# Patient Record
Sex: FEMALE | Race: WHITE | NOT HISPANIC OR LATINO | Employment: OTHER | URBAN - METROPOLITAN AREA
[De-identification: names, ages, dates, MRNs, and addresses within clinical notes are randomized per-mention and may not be internally consistent; named-entity substitution may affect disease eponyms.]

---

## 2017-05-24 ENCOUNTER — TELEPHONE (OUTPATIENT)
Dept: FAMILY MEDICINE | Facility: CLINIC | Age: 82
End: 2017-05-24

## 2017-05-24 RX ORDER — ASPIRIN 81 MG/1
1 TABLET ORAL DAILY
COMMUNITY
End: 2018-05-02

## 2017-05-24 RX ORDER — ALPRAZOLAM 0.25 MG/1
1 TABLET ORAL DAILY
COMMUNITY
Start: 2016-11-22 | End: 2017-12-27

## 2017-05-24 NOTE — TELEPHONE ENCOUNTER
Pt is taking benzonatate for a cough.  Can she take [facility] tussin DM max cough and chest congestion with it?

## 2017-06-19 LAB — HM EYE EXAM: NORMAL

## 2017-10-20 ENCOUNTER — TELEPHONE (OUTPATIENT)
Dept: FAMILY MEDICINE | Facility: CLINIC | Age: 82
End: 2017-10-20

## 2017-10-20 DIAGNOSIS — R73.9 HYPERGLYCEMIA: Primary | ICD-10-CM

## 2017-10-20 NOTE — TELEPHONE ENCOUNTER
----- Message from Ava Bahena sent at 10/20/2017 10:17 AM CDT -----  Contact: patient  Patient needs A1C lab done but wants to go to [facility] on [address]. Please call her at ph# [phone]

## 2017-12-27 ENCOUNTER — OFFICE VISIT (OUTPATIENT)
Dept: FAMILY MEDICINE | Facility: CLINIC | Age: 82
End: 2017-12-27
Payer: MEDICARE

## 2017-12-27 VITALS
HEIGHT: 66 IN | WEIGHT: 119 LBS | HEART RATE: 69 BPM | SYSTOLIC BLOOD PRESSURE: 122 MMHG | DIASTOLIC BLOOD PRESSURE: 62 MMHG | BODY MASS INDEX: 19.13 KG/M2 | OXYGEN SATURATION: 98 %

## 2017-12-27 DIAGNOSIS — E11.9 TYPE 2 DIABETES MELLITUS WITHOUT COMPLICATION, WITHOUT LONG-TERM CURRENT USE OF INSULIN: ICD-10-CM

## 2017-12-27 DIAGNOSIS — G44.219 EPISODIC TENSION-TYPE HEADACHE, NOT INTRACTABLE: Primary | ICD-10-CM

## 2017-12-27 DIAGNOSIS — Z23 FLU VACCINE NEED: ICD-10-CM

## 2017-12-27 PROBLEM — G44.209 TENSION TYPE HEADACHE: Status: ACTIVE | Noted: 2017-12-27

## 2017-12-27 PROCEDURE — G0008 ADMIN INFLUENZA VIRUS VAC: HCPCS | Mod: ,,, | Performed by: FAMILY MEDICINE

## 2017-12-27 PROCEDURE — 99213 OFFICE O/P EST LOW 20 MIN: CPT | Mod: 25,,, | Performed by: FAMILY MEDICINE

## 2017-12-27 PROCEDURE — 90662 IIV NO PRSV INCREASED AG IM: CPT | Mod: ,,, | Performed by: FAMILY MEDICINE

## 2017-12-27 NOTE — PATIENT INSTRUCTIONS
Diabetes and Heart Disease     Take your medicines as directed each day, even if you feel fine.   If you have diabetes, you are two to four times more likely to have heart disease than someone without diabetes. This higher risk is due to diabetes, but it is also due to other risk factors for heart disease that happen in people with diabetes. But theres good news. You can help control your health risks by making some changes in your life. You can take steps to reduce your risk of heart disease by half--similar to the risk in people who don't have diabetes.  Your main risk factors  Three major risk factors for heart disease are high blood sugar, high blood pressure, and high levels of lipids. By keeping risk factors under control, you can help keep your heart and arteries healthy. This may reduce your chances of a heart attack.  · Blood sugar. High blood sugar can make artery walls tough and rough. Plaque (waxy material in the blood) can then build up along the artery walls, making it harder for blood to flow through the arteries. Having high blood sugar increases the chances of having high blood pressure and high cholesterol.  · Blood pressure. When blood pressure is high all the time it causes your heart to work harder to pump blood. Artery walls become damaged. This increases the risk for plaque build up.  · Lipids. The body needs some lipids in the blood to stay healthy. But lipid levels that are too high can damage the artery walls. Lipids include cholesterol and triglycerides. There are two kinds of cholesterol. LDL (bad) cholesterol can damage the arteries. But HDL (good) cholesterol helps clear LDL cholesterol from the blood vessels. This helps keep the arteries healthy. When blood sugar is high, the level of triglycerides in the blood may also be high. High blood triglyceride levels can cause plaque to form.   Other risk factors  Certain lifestyle factors can increase levels of your blood sugar, blood  pressure, and lipids. Such increases raise your risk of heart disease:  · Smoking damages the lining of your arteries. This allows plaque to build up in the artery walls. Smoking also constricts (narrows) the arteries. This can raise blood pressure and cause chest pain or angina. Smoking also increases your risk of getting type 2 diabetes.  · Not being active makes it harder for your heart to do its work. Inactivity is linked to many other risk factors, such as high blood pressure and poor cholesterol levels. Inactivity also increases your risk of getting type 2 diabetes.  · Being overweight makes it harder for your body to use insulin. It also makes your heart work too hard. Being overweight is also the main contributor to the development of type 2 diabetes,   Changes you can make  Following a few simple steps can help keep your risk factors under control. Work with your healthcare team to reach your goals.  · Quitting smoking could save your life. Smoking damages the lining of the blood vessels and raises blood pressure. Smoking also affects how your body uses insulin. This makes it harder to keep blood sugar under control. If you smoke and need help quitting, talk to your healthcare team.   · Testing your blood sugar is the only way to know whether it is under control. Be sure to test your blood sugar yourself. Also get your blood tested in the lab, as directed.  · Monitoring your blood pressure and lipid levels can help you achieve safe levels. Visit your healthcare team as scheduled.  · Taking medicines as directed can help control blood sugar, blood pressure, blood clotting, and/or cholesterol levels.  · Eating right can reduce your risk factors and help you lose weight. Try to limit the amount of processed or refined carbohydrates you eat at one time. Cut back on your total calorie intake. Eat foods low in saturated fat and cholesterol. Eat fiber, including vegetables and whole grains, and cut down on salt. A  dietitian or diabetes educator can help form a meal plan that works for you--even if you are on a low budget.   · Being active can help reduce your weight, strengthen your heart, and lower your lipid levels and blood pressure. Exercise and activity are good for your whole body. Talk to your healthcare team about increasing your activity safely over time.  · Keeping your appointments with your healthcare provider helps you stay healthy. Go in for checkups and lab tests as scheduled.  Date Last Reviewed: 5/19/2016  © 0216-0589 [employer]. [address] [place] [address], [place] [address]. All rights reserved. This information is not intended as a substitute for professional medical care. Always follow your healthcare professional's instructions.

## 2017-12-27 NOTE — PROGRESS NOTES
Subjective:       Patient ID: Jennifer Alarcon is a 85 y.o. female.    Chief Complaint: Flu Vaccine and Headache (with stress that comes and goes )    Headache    This is a chronic problem. The current episode started more than 1 year ago. The problem occurs intermittently. The problem has been unchanged. The pain is located in the left unilateral region. The pain does not radiate. The quality of the pain is described as stabbing. The pain is at a severity of 2/10. The pain is mild. Associated symptoms include abdominal pain. Pertinent negatives include no coughing, dizziness, ear pain, eye pain, fever, hearing loss, insomnia, loss of balance, muscle aches, nausea, neck pain, photophobia, scalp tenderness, seizures, sinus pressure, sore throat, swollen glands, tingling, tinnitus, visual change, vomiting or weakness. Nothing aggravates the symptoms. She has tried nothing for the symptoms.     Review of Systems   Constitutional: Negative for appetite change, chills, diaphoresis, fatigue and fever.   HENT: Negative for congestion, ear pain, hearing loss, sinus pressure, sore throat, tinnitus and trouble swallowing.    Eyes: Negative for photophobia, pain and visual disturbance.   Respiratory: Negative for cough, chest tightness and shortness of breath.    Cardiovascular: Negative for chest pain, palpitations and leg swelling.   Gastrointestinal: Positive for abdominal pain. Negative for blood in stool, constipation, diarrhea, nausea and vomiting.   Endocrine: Negative for cold intolerance and heat intolerance.   Genitourinary: Negative for difficulty urinating, flank pain, pelvic pain and vaginal pain.   Musculoskeletal: Negative for arthralgias, myalgias and neck pain.   Skin: Negative for rash.   Allergic/Immunologic: Negative for immunocompromised state.   Neurological: Positive for headaches. Negative for dizziness, tingling, tremors, seizures, syncope, speech difficulty, weakness, light-headedness and loss of  "balance.   Hematological: Negative for adenopathy. Does not bruise/bleed easily.   Psychiatric/Behavioral: Negative for confusion, decreased concentration, dysphoric mood, self-injury and suicidal ideas. The patient does not have insomnia.        Past Medical History:   Diagnosis Date    Atrial fibrillation, controlled     Cancer     colon    Diabetes mellitus, type 2     Hypertension     Hypothyroidism       Past Surgical History:   Procedure Laterality Date    APPENDECTOMY      BREAST SURGERY      lt breast bx    CHOLECYSTECTOMY      COLON SURGERY      EYE SURGERY         History reviewed. No pertinent family history.    Social History     Social History    Marital status:      Spouse name: N/A    Number of children: N/A    Years of education: N/A     Occupational History    retired      Social History Main Topics    Smoking status: Never Smoker    Smokeless tobacco: Never Used    Alcohol use No    Drug use: No    Sexual activity: No     Other Topics Concern    None     Social History Narrative    None       Current Outpatient Prescriptions   Medication Sig Dispense Refill    aspirin (ECOTRIN) 81 MG EC tablet Take 1 tablet by mouth once daily at 6am.       No current facility-administered medications for this visit.        Review of patient's allergies indicates:   Allergen Reactions    Cortisone      abd pain    Nitrofurantoin monohyd/m-cryst      leg cramps     Objective:    HPI     Headache    Additional comments: with stress that comes and goes        Last edited by Dwaine Gonzalez MA on 12/27/2017 10:53 AM. (History)      Blood pressure 122/62, pulse 69, height 5' 5.5" (1.664 m), weight 54 kg (119 lb), SpO2 98 %. Body mass index is 19.5 kg/m².   Physical Exam   Constitutional: She is oriented to person, place, and time. She appears well-developed and well-nourished. She is cooperative. No distress.   HENT:   Head: Normocephalic and atraumatic.   Right Ear: Tympanic membrane " normal.   Left Ear: Tympanic membrane normal.   Eyes: Conjunctivae, EOM and lids are normal. Pupils are equal, round, and reactive to light. Lids are everted and swept, no foreign bodies found. Right pupil is round and reactive. Left pupil is round and reactive.   Neck: Trachea normal and normal range of motion. Neck supple.   Cardiovascular: Normal rate, regular rhythm, S1 normal, S2 normal, normal heart sounds and intact distal pulses.    Pulmonary/Chest: Breath sounds normal.   Abdominal: There is no rigidity and no guarding.   Musculoskeletal: Normal range of motion.   Lymphadenopathy:     She has no cervical adenopathy.     She has no axillary adenopathy.   Neurological: She is alert and oriented to person, place, and time.   Skin: Skin is warm and dry. Capillary refill takes less than 2 seconds.   Psychiatric: She has a normal mood and affect. Her behavior is normal. Judgment and thought content normal.   Nursing note and vitals reviewed.          Assessment:       1. Episodic tension-type headache, not intractable    2. Flu vaccine need    3. Type 2 diabetes mellitus without complication, without long-term current use of insulin        Plan:       Jennifer was seen today for flu vaccine and headache.    Diagnoses and all orders for this visit:    Episodic tension-type headache, not intractable - likely TMJ associated    Flu vaccine need  -     Influenza - High Dose (65+) (PF) (IM)    Type 2 diabetes mellitus without complication, without long-term current use of insulin  Comments:  6.0 diet controlled  Orders:  -     Hemoglobin A1C, POCT

## 2018-04-10 ENCOUNTER — TELEPHONE (OUTPATIENT)
Dept: FAMILY MEDICINE | Facility: CLINIC | Age: 83
End: 2018-04-10

## 2018-04-10 NOTE — TELEPHONE ENCOUNTER
----- Message from Sharita Henao LPN sent at 4/5/2018  3:50 PM CDT -----      ----- Message -----  From: Camila Rodriguez  Sent: 4/5/2018   7:37 AM  To: Bryan Lopez Staff    Patient was discharged from [facility] on 4/4/18. Please schedule f/u ov with patient before 5/4/18. We need to document discharged medicines with current medicine list and bill the 1111F code on claim.

## 2018-05-02 ENCOUNTER — OFFICE VISIT (OUTPATIENT)
Dept: FAMILY MEDICINE | Facility: CLINIC | Age: 83
End: 2018-05-02
Payer: MEDICARE

## 2018-05-02 VITALS
HEART RATE: 54 BPM | HEIGHT: 66 IN | WEIGHT: 115 LBS | DIASTOLIC BLOOD PRESSURE: 60 MMHG | OXYGEN SATURATION: 96 % | BODY MASS INDEX: 18.48 KG/M2 | SYSTOLIC BLOOD PRESSURE: 114 MMHG

## 2018-05-02 DIAGNOSIS — E03.9 HYPOTHYROIDISM, UNSPECIFIED TYPE: ICD-10-CM

## 2018-05-02 DIAGNOSIS — I48.91 ATRIAL FIBRILLATION, CONTROLLED: Primary | ICD-10-CM

## 2018-05-02 DIAGNOSIS — E11.9 TYPE 2 DIABETES MELLITUS WITHOUT COMPLICATION, WITHOUT LONG-TERM CURRENT USE OF INSULIN: ICD-10-CM

## 2018-05-02 DIAGNOSIS — K59.00 CONSTIPATION, UNSPECIFIED CONSTIPATION TYPE: ICD-10-CM

## 2018-05-02 PROBLEM — I10 HYPERTENSION: Status: ACTIVE | Noted: 2018-05-02

## 2018-05-02 PROBLEM — C18.9 CANCER OF COLON: Status: ACTIVE | Noted: 2018-05-02

## 2018-05-02 PROCEDURE — 99214 OFFICE O/P EST MOD 30 MIN: CPT | Mod: ,,, | Performed by: NURSE PRACTITIONER

## 2018-05-02 RX ORDER — METOPROLOL TARTRATE 50 MG/1
1 TABLET ORAL 2 TIMES DAILY
Status: ON HOLD | COMMUNITY
Start: 2018-05-01 | End: 2020-01-04 | Stop reason: HOSPADM

## 2018-05-02 RX ORDER — SENNOSIDES 8.6 MG
1 TABLET ORAL 2 TIMES DAILY
Refills: 0 | COMMUNITY
Start: 2018-04-04 | End: 2019-12-02

## 2018-05-02 RX ORDER — TRIAMCINOLONE ACETONIDE 1 MG/G
PASTE DENTAL
COMMUNITY
End: 2021-08-11

## 2018-05-02 NOTE — PATIENT INSTRUCTIONS
Constipation (Adult)  Constipation means that you have bowel movements that are less frequent than usual. Stools often become very hard and difficult to pass.  Constipation is very common. At some point in life it affects almost everyone. Since everyone's bowel habits are different, what is constipation to one person may not be to another. Your healthcare provider may do tests to diagnose constipation. It depends on what he or she finds when evaluating you.    Symptoms of constipation include:  · Abdominal pain  · Bloating  · Vomiting  · Painful bowel movements  · Itching, swelling, bleeding, or pain around the anus  Causes  Constipation can have many causes. These include:  · Diet low in fiber  · Too much dairy  · Not drinking enough liquids  · Lack of exercise or physical activity. This is especially true for older adults.  · Changes in lifestyle or daily routine, including pregnancy, aging, work, and travel  · Frequent use or misuse of laxatives  · Ignoring the urge to have a bowel movement or delaying it until later  · Medicines, such as certain prescription pain medicines, iron supplements, antacids, certain antidepressants, and calcium supplements  · Diseases like irritable bowel syndrome, bowel obstructions, stroke, diabetes, thyroid disease, Parkinson disease, hemorrhoids, and colon cancer  Complications  Potential complications of constipation can include:  · Hemorrhoids  · Rectal bleeding from hemorrhoids or anal fissures (skin tears)  · Hernias  · Dependency on laxatives  · Chronic constipation  · Fecal impaction  · Bowel obstruction or perforation  Home care  All treatment should be done after talking with your healthcare provider. This is especially true if you have another medical problems, are taking prescription medicines, or are an older adult. Treatment most often involves lifestyle changes. You may also need medicines. Your healthcare provider will tell you which will work best for you. Follow  the advice below to help avoid this problem in the future.  Lifestyle changes  These lifestyle changes can help prevent constipation:  · Diet. Eat a high-fiber diet, with fresh fruit and vegetables, and reduce dairy intake, meats, and processed foods  · Fluids. It's important to get enough fluids each day. Drink plenty of water when you eat more fiber. If you are on diet that limits the amount of fluid you can have, talk about this with your healthcare provider.  · Regular exercise. Check with your healthcare provider first.  Medications  Take any medicines as directed. Some laxatives are safe to use only every now and then. Others can be taken on a regular basis. Talk with your doctor or pharmacist if you have questions.  Prescription pain medicines can cause constipation. If you are taking this kind of medicine, ask your healthcare provider if you should also take a stool softener.  Medicines you may take to treat constipation include:  · Fiber supplements  · Stool softeners  · Laxatives  · Enemas  · Rectal suppositories  Follow-up care  Follow up with your healthcare provider if symptoms don't get better in the next few days. You may need to have more tests or see a specialist.  Call 911  Call 911 if any of these occur:  · Trouble breathing  · Stiff, rigid abdomen that is severely painful to touch  · Confusion  · Fainting or loss of consciousness  · Rapid heart rate  · Chest pain  When to seek medical advice  Call your healthcare provider right away if any of these occur:  · Fever over 100.4°F (38°C)  · Failure to resume normal bowel movements  · Pain in your abdomen or back gets worse  · Nausea or vomiting  · Swelling in your abdomen  · Blood in the stool  · Black, tarry stool  · Involuntary weight loss  · Weakness  Date Last Reviewed: 12/30/2015  © 2000-8629 [employer] [address] [place] [address], [place] [address]. All rights reserved. This information is not intended as a substitute for  professional medical care. Always follow your healthcare professional's instructions.

## 2018-05-02 NOTE — PROGRESS NOTES
SUBJECTIVE:      Patient ID: Jennifer Alarcon is a 85 y.o. female.    Chief Complaint: Hospital Follow Up (recurrent A-Fib)    Hospital f/u for A fib with RVR. Was having a colonoscopy with Dr Ma when she went into A fib with RVR, sent to the ER and admitted for rate control. Discharged on Xarelto and metoprolol. She has a f/u with Dr Horan May 15th. Patient was constipated in hospital, was discharged on a stool softener which she is not taking. Once again constipation. Will give education.   History of hypothyroidism: TSH 5.77 in hospital, T3 2.8, thyroxine free 0.91. Will monitor    Heart Problem   This is a new (A fib with RVR) problem. The current episode started 1 to 4 weeks ago. The problem has been gradually improving. Pertinent negatives include no chest pain, chills, diaphoresis, fever, joint swelling, vomiting or weakness. Treatments tried: beta blocker.   Constipation   This is a new problem. The current episode started 1 to 4 weeks ago. The problem is unchanged. Her stool frequency is 2 to 3 times per week. The patient is not on a high fiber diet. She does not exercise regularly. There has not been adequate water intake. Pertinent negatives include no difficulty urinating, fever or vomiting. She has tried stool softeners for the symptoms.       Past Surgical History:   Procedure Laterality Date    APPENDECTOMY      BREAST SURGERY      lt breast bx    CHOLECYSTECTOMY      COLON SURGERY      EYE SURGERY       History reviewed. No pertinent family history.   Social History     Social History    Marital status:      Spouse name: N/A    Number of children: N/A    Years of education: N/A     Occupational History    retired      Social History Main Topics    Smoking status: Never Smoker    Smokeless tobacco: Never Used    Alcohol use No    Drug use: No    Sexual activity: No     Other Topics Concern    None     Social History Narrative    None     Current Outpatient  Prescriptions   Medication Sig Dispense Refill    metoprolol tartrate (LOPRESSOR) 50 MG tablet 1 tablet 2 (two) times daily.      rivaroxaban (XARELTO) 20 mg Tab Take 20 mg by mouth daily with dinner or evening meal.      SENNA 8.6 mg tablet 1 tablet 2 (two) times daily.  0    triamcinolone acetonide 0.1% (KENALOG) 0.1 % paste triamcinolone 0.1 % topical ointment and dimethicone 5 % topical cream       No current facility-administered medications for this visit.      Review of patient's allergies indicates:   Allergen Reactions    Cortisone      abd pain    Nitrofurantoin monohyd/m-cryst      leg cramps      Past Medical History:   Diagnosis Date    Atrial fibrillation, controlled     Cancer     colon    Diabetes mellitus, type 2     Hypertension     Hypothyroidism      Past Surgical History:   Procedure Laterality Date    APPENDECTOMY      BREAST SURGERY      lt breast bx    CHOLECYSTECTOMY      COLON SURGERY      EYE SURGERY         Review of Systems   Constitutional: Negative for appetite change, chills, diaphoresis, fever and unexpected weight change.   HENT: Negative for ear discharge, hearing loss, trouble swallowing and voice change.    Eyes: Negative for photophobia and pain.   Respiratory: Negative for chest tightness, shortness of breath and stridor.    Cardiovascular: Negative for chest pain.   Gastrointestinal: Positive for constipation. Negative for blood in stool and vomiting.   Endocrine: Negative for cold intolerance and heat intolerance.   Genitourinary: Negative for difficulty urinating and flank pain.   Musculoskeletal: Negative for joint swelling and neck stiffness.   Skin: Negative for pallor.   Neurological: Negative for dizziness, speech difficulty, weakness and light-headedness.   Hematological: Does not bruise/bleed easily.   Psychiatric/Behavioral: Negative for confusion.      OBJECTIVE:      Vitals:    05/02/18 0957   BP: 114/60   Pulse: (!) 54   SpO2: 96%   Weight: 52.2 kg  "(115 lb)   Height: 5' 5.5" (1.664 m)     Physical Exam   Constitutional: She is oriented to person, place, and time. She appears well-developed and well-nourished.   HENT:   Head: Atraumatic.   Mouth/Throat: Oropharynx is clear and moist.   Eyes: Conjunctivae are normal.   Neck: Neck supple. No JVD present. Carotid bruit is not present.   Cardiovascular: Normal rate, regular rhythm, normal heart sounds and intact distal pulses.    Pulses:       Popliteal pulses are 2+ on the right side, and 2+ on the left side.   Pulmonary/Chest: Effort normal and breath sounds normal. She has no wheezes. She has no rhonchi. She has no rales.   Abdominal: Soft. Bowel sounds are normal. She exhibits no distension. There is no tenderness.   Musculoskeletal: Normal range of motion.   Lymphadenopathy:     She has no cervical adenopathy.   Neurological: She is alert and oriented to person, place, and time.   Skin: Skin is warm and dry. No rash noted.   Psychiatric: She has a normal mood and affect. Her speech is normal.   Nursing note and vitals reviewed.   hospital records reviewed  Assessment:       1. Atrial fibrillation, controlled    2. Constipation, unspecified constipation type    3. Hypothyroidism, unspecified type    4. Type 2 diabetes mellitus without complication, without long-term current use of insulin        Plan:       Atrial fibrillation, controlled  Cont current meds and keep f/u with Dr Horan     Constipation, unspecified constipation type  Patient will restart Senna    Hypothyroidism, unspecified type  Stable     Type 2 diabetes mellitus without complication, without long-term current use of insulin  Stable, patient will be due for A1c in June      Follow-up for  has f/u with Dr Adams, will need A1c in office.      5/2/2018 Nicolas Bryant, AKIL, FNP      "

## 2018-06-28 ENCOUNTER — OFFICE VISIT (OUTPATIENT)
Dept: FAMILY MEDICINE | Facility: CLINIC | Age: 83
End: 2018-06-28
Payer: MEDICARE

## 2018-06-28 VITALS
HEIGHT: 66 IN | DIASTOLIC BLOOD PRESSURE: 60 MMHG | BODY MASS INDEX: 18.32 KG/M2 | HEART RATE: 56 BPM | SYSTOLIC BLOOD PRESSURE: 120 MMHG | OXYGEN SATURATION: 96 % | WEIGHT: 114 LBS

## 2018-06-28 DIAGNOSIS — E11.9 TYPE 2 DIABETES MELLITUS WITHOUT COMPLICATION, WITH LONG-TERM CURRENT USE OF INSULIN: Primary | ICD-10-CM

## 2018-06-28 DIAGNOSIS — Z79.4 TYPE 2 DIABETES MELLITUS WITHOUT COMPLICATION, WITH LONG-TERM CURRENT USE OF INSULIN: Primary | ICD-10-CM

## 2018-06-28 DIAGNOSIS — I48.91 CONTROLLED ATRIAL FIBRILLATION: ICD-10-CM

## 2018-06-28 LAB — HBA1C MFR BLD: 6.4 %

## 2018-06-28 PROCEDURE — 83036 HEMOGLOBIN GLYCOSYLATED A1C: CPT | Mod: QW,,, | Performed by: FAMILY MEDICINE

## 2018-06-28 PROCEDURE — 99213 OFFICE O/P EST LOW 20 MIN: CPT | Mod: ,,, | Performed by: FAMILY MEDICINE

## 2018-06-28 RX ORDER — MUPIROCIN 20 MG/G
OINTMENT TOPICAL
Refills: 3 | COMMUNITY
Start: 2018-06-18 | End: 2020-01-09

## 2018-06-28 NOTE — PROGRESS NOTES
Subjective:       Patient ID: Jennifer Alarcon is a 85 y.o. female.    Chief Complaint: Diabetes (A1c in office)    Controlling with diet, she is hesitant to take any meds      Diabetes   She presents for her follow-up diabetic visit. She has type 2 diabetes mellitus. Her disease course has been stable. Pertinent negatives for hypoglycemia include no confusion, dizziness or headaches. Pertinent negatives for diabetes include no blurred vision, no chest pain, no fatigue, no foot ulcerations, no visual change and no weakness.     Review of Systems   Constitutional: Negative for appetite change, chills, diaphoresis, fatigue and fever.   HENT: Negative for congestion, ear pain, hearing loss, sore throat and trouble swallowing.    Eyes: Negative for blurred vision, photophobia, pain and visual disturbance.   Respiratory: Negative for cough, chest tightness and shortness of breath.    Cardiovascular: Negative for chest pain, palpitations and leg swelling.   Gastrointestinal: Negative for abdominal pain, blood in stool, constipation, diarrhea, nausea and vomiting.   Endocrine: Negative for cold intolerance and heat intolerance.   Genitourinary: Negative for difficulty urinating, flank pain, pelvic pain and vaginal pain.   Musculoskeletal: Negative for arthralgias and myalgias.   Skin: Negative for rash.   Allergic/Immunologic: Negative for immunocompromised state.   Neurological: Negative for dizziness, weakness, light-headedness and headaches.   Hematological: Negative for adenopathy. Does not bruise/bleed easily.   Psychiatric/Behavioral: Negative for confusion, self-injury and suicidal ideas.       Past Medical History:   Diagnosis Date    Atrial fibrillation, controlled     Cancer     colon    Diabetes mellitus, type 2     Hypertension     Hypothyroidism       Past Surgical History:   Procedure Laterality Date    APPENDECTOMY      BREAST SURGERY      lt breast bx    CHOLECYSTECTOMY      COLON SURGERY      EYE  "SURGERY         No family history on file.    Social History     Social History    Marital status:      Spouse name: N/A    Number of children: N/A    Years of education: N/A     Occupational History    retired      Social History Main Topics    Smoking status: Never Smoker    Smokeless tobacco: Never Used    Alcohol use No    Drug use: No    Sexual activity: No     Other Topics Concern    None     Social History Narrative    None       Current Outpatient Prescriptions   Medication Sig Dispense Refill    metoprolol tartrate (LOPRESSOR) 50 MG tablet 1 tablet 2 (two) times daily.      mupirocin (BACTROBAN) 2 % ointment BLANCA A SMALL AMOUNT AA QHS  3    rivaroxaban (XARELTO) 20 mg Tab Take 20 mg by mouth daily with dinner or evening meal.      SENNA 8.6 mg tablet 1 tablet 2 (two) times daily.  0    triamcinolone acetonide 0.1% (KENALOG) 0.1 % paste triamcinolone 0.1 % topical ointment and dimethicone 5 % topical cream       No current facility-administered medications for this visit.        Review of patient's allergies indicates:   Allergen Reactions    Cortisone      abd pain    Nitrofurantoin monohyd/m-cryst      leg cramps     Objective:    HPI     Diabetes    Additional comments: A1c in office       Last edited by Kacie Smallwood MA on 6/28/2018 10:39 AM. (History)      Blood pressure 120/60, pulse (!) 56, height 5' 5.5" (1.664 m), weight 51.7 kg (114 lb), SpO2 96 %. Body mass index is 18.68 kg/m².   Physical Exam   Constitutional: She is oriented to person, place, and time. She appears well-developed and well-nourished. She is cooperative. No distress.   HENT:   Head: Normocephalic and atraumatic.   Right Ear: Tympanic membrane normal.   Left Ear: Tympanic membrane normal.   Eyes: Conjunctivae, EOM and lids are normal. Pupils are equal, round, and reactive to light. Lids are everted and swept, no foreign bodies found. Right pupil is round and reactive. Left pupil is round and reactive. "   Neck: Trachea normal and normal range of motion. Neck supple.   Cardiovascular: Normal rate, regular rhythm, S1 normal, S2 normal, normal heart sounds and intact distal pulses.    Pulmonary/Chest: Breath sounds normal.   Abdominal: There is no rigidity and no guarding.   Musculoskeletal: Normal range of motion.   Lymphadenopathy:     She has no cervical adenopathy.     She has no axillary adenopathy.   Neurological: She is alert and oriented to person, place, and time.   Skin: Skin is warm and dry. Capillary refill takes less than 2 seconds.   Psychiatric: She has a normal mood and affect. Her behavior is normal. Judgment and thought content normal.   Nursing note and vitals reviewed.          Assessment:       1. Type 2 diabetes mellitus without complication, with long-term current use of insulin    2. Controlled atrial fibrillation        Plan:       Jennifer was seen today for diabetes.    Diagnoses and all orders for this visit:    Type 2 diabetes mellitus without complication, with long-term current use of insulin  -     Hemoglobin A1C, POCT    Controlled atrial fibrillation  The patient is asked to make an attempt to improve diet and exercise patterns to aid in medical management of this problem.  Stevie 6m or any changes

## 2018-09-27 ENCOUNTER — TELEPHONE (OUTPATIENT)
Dept: FAMILY MEDICINE | Facility: CLINIC | Age: 83
End: 2018-09-27

## 2018-09-27 NOTE — TELEPHONE ENCOUNTER
----- Message from Linda Watts sent at 9/27/2018  1:53 PM CDT -----  Pt scheduled with Dr. Gonzales Monday 10/1/18.  Asking for recommendation of OTC for cough.  I offered her appt today but she cannot get here today.

## 2018-10-01 ENCOUNTER — OFFICE VISIT (OUTPATIENT)
Dept: FAMILY MEDICINE | Facility: CLINIC | Age: 83
End: 2018-10-01
Payer: MEDICARE

## 2018-10-01 VITALS
DIASTOLIC BLOOD PRESSURE: 62 MMHG | WEIGHT: 115 LBS | HEIGHT: 66 IN | BODY MASS INDEX: 18.48 KG/M2 | OXYGEN SATURATION: 96 % | TEMPERATURE: 97 F | HEART RATE: 62 BPM | SYSTOLIC BLOOD PRESSURE: 120 MMHG

## 2018-10-01 DIAGNOSIS — E11.9 TYPE 2 DIABETES MELLITUS WITHOUT COMPLICATION, WITHOUT LONG-TERM CURRENT USE OF INSULIN: ICD-10-CM

## 2018-10-01 DIAGNOSIS — R05.9 COUGH: ICD-10-CM

## 2018-10-01 DIAGNOSIS — E03.4 HYPOTHYROIDISM DUE TO ACQUIRED ATROPHY OF THYROID: Primary | ICD-10-CM

## 2018-10-01 PROBLEM — C18.9 COLON CANCER: Status: RESOLVED | Noted: 2018-05-02 | Resolved: 2018-10-01

## 2018-10-01 PROCEDURE — 99213 OFFICE O/P EST LOW 20 MIN: CPT | Mod: ,,, | Performed by: INTERNAL MEDICINE

## 2018-10-01 NOTE — PROGRESS NOTES
Subjective:       Patient ID: Jennifer Alarcon is a 85 y.o. female.    Chief Complaint: Thyroid Problem (abnormal labs) and Cough (x 2 weeks)    Here to review some recent labs she had done with her Cardiologist; found to have TSH of 13.5.  In review of her chart, she has apparently had an abnormal TSH going back about 10 years but prior attempts to use levothyroxine resulted in tachycardia.  She doesn't remember being on meds in the past.  I see a TSH as high as 12.4 from 2015.  She was in the hospital at the end of March and TSH was 5.77.        Thyroid Problem   Presents for initial visit. Symptoms include constipation (takes Senna for constipation), dry skin (not new), fatigue (about 4-6 months) and hair loss. Patient reports no anxiety, cold intolerance, diarrhea, heat intolerance, leg swelling, menstrual problem, palpitations (h/o Afib but no episodes in last several months), tremors, weight gain or weight loss. The symptoms have been worsening. Her past medical history is significant for atrial fibrillation and diabetes.   Cough   This is a new problem. The current episode started 1 to 4 weeks ago. The problem has been gradually improving. The cough is non-productive (had phlegm initially but none now). Associated symptoms include myalgias, shortness of breath and wheezing. Pertinent negatives include no chest pain, chills, fever, headaches, postnasal drip, rash, rhinorrhea or weight loss. There is no history of environmental allergies.     Review of Systems   Constitutional: Positive for fatigue (about 4-6 months). Negative for chills, fever, unexpected weight change, weight gain and weight loss.   HENT: Positive for hearing loss. Negative for congestion, postnasal drip, rhinorrhea, trouble swallowing and voice change.    Eyes: Negative for photophobia and visual disturbance.   Respiratory: Positive for cough, shortness of breath and wheezing. Negative for apnea, choking and chest tightness.     Cardiovascular: Negative for chest pain, palpitations (h/o Afib but no episodes in last several months) and leg swelling.   Gastrointestinal: Positive for constipation (takes Senna for constipation). Negative for abdominal pain, blood in stool, diarrhea, nausea, rectal pain and vomiting.   Endocrine: Negative for cold intolerance, heat intolerance, polydipsia and polyuria.   Genitourinary: Negative for decreased urine volume, difficulty urinating, dysuria, frequency, genital sores, hematuria, menstrual problem, pelvic pain, urgency, vaginal bleeding and vaginal discharge.   Musculoskeletal: Positive for arthralgias and myalgias. Negative for back pain, gait problem, joint swelling, neck pain and neck stiffness.   Skin: Negative for color change, rash and wound.   Allergic/Immunologic: Negative for environmental allergies and food allergies.   Neurological: Negative for dizziness, tremors, seizures, syncope, facial asymmetry, speech difficulty, weakness, light-headedness, numbness and headaches.   Hematological: Negative for adenopathy. Bruises/bleeds easily.   Psychiatric/Behavioral: Positive for sleep disturbance. Negative for confusion, hallucinations and suicidal ideas. The patient is not nervous/anxious.        Past Medical History:   Diagnosis Date    Atrial fibrillation, controlled     Colon cancer     Diabetes mellitus, type 2     Hypertension     Hypothyroidism       Past Surgical History:   Procedure Laterality Date    APPENDECTOMY      BREAST SURGERY      lt breast bx    CHOLECYSTECTOMY      COLON SURGERY      EYE SURGERY         Family History   Problem Relation Age of Onset    Diabetes Mother     Heart disease Mother     Heart disease Father     Diabetes Father        Social History     Socioeconomic History    Marital status:      Spouse name: None    Number of children: None    Years of education: None    Highest education level: None   Social Needs    Financial resource  "strain: None    Food insecurity - worry: None    Food insecurity - inability: None    Transportation needs - medical: None    Transportation needs - non-medical: None   Occupational History    Occupation: retired   Tobacco Use    Smoking status: Never Smoker    Smokeless tobacco: Never Used   Substance and Sexual Activity    Alcohol use: No    Drug use: No    Sexual activity: No   Other Topics Concern    None   Social History Narrative    Live alone       Current Outpatient Medications   Medication Sig Dispense Refill    metoprolol tartrate (LOPRESSOR) 50 MG tablet 1 tablet 2 (two) times daily.      mupirocin (BACTROBAN) 2 % ointment BLANCA A SMALL AMOUNT AA QHS  3    rivaroxaban (XARELTO) 20 mg Tab Take 20 mg by mouth daily with dinner or evening meal.      SENNA 8.6 mg tablet 1 tablet 2 (two) times daily.  0    triamcinolone acetonide 0.1% (KENALOG) 0.1 % paste triamcinolone 0.1 % topical ointment and dimethicone 5 % topical cream       No current facility-administered medications for this visit.        Review of patient's allergies indicates:   Allergen Reactions    Cortisone      abd pain    Nitrofurantoin monohyd/m-cryst      leg cramps     Objective:    HPI     Thyroid Problem      Additional comments: abnormal labs              Cough      Additional comments: x 2 weeks          Last edited by Mario Pichardo MA on 10/1/2018  9:08 AM. (History)      Blood pressure 120/62, pulse 62, temperature 97.4 °F (36.3 °C), height 5' 5.5" (1.664 m), weight 52.2 kg (115 lb), SpO2 96 %. Body mass index is 18.85 kg/m².   Physical Exam   Constitutional: She appears well-developed. No distress.   HENT:   Nose: Nose normal.   Mouth/Throat: Oropharynx is clear and moist.   Eyes: Conjunctivae are normal. Right eye exhibits no discharge. Left eye exhibits no discharge. No scleral icterus.   Neck: Carotid bruit is not present.   Cardiovascular: Normal rate, regular rhythm and normal heart sounds.   No murmur " heard.  Pulmonary/Chest: Effort normal and breath sounds normal. No respiratory distress. She has no decreased breath sounds. She has no wheezes. She has no rhonchi. She has no rales.   Abdominal: Soft. She exhibits no distension. There is no tenderness. There is no rebound and no guarding.   Musculoskeletal: She exhibits no edema.   Neurological: She is alert. She displays no tremor. Gait (ambulation with cane) abnormal.   Skin: Skin is warm and dry.   Psychiatric: She has a normal mood and affect. Her speech is normal.   Nursing note and vitals reviewed.          Assessment:       1. Hypothyroidism due to acquired atrophy of thyroid    2. Cough    3. Type 2 diabetes mellitus without complication, without long-term current use of insulin        Plan:       Jennifer was seen today for thyroid problem and cough.    Diagnoses and all orders for this visit:    Hypothyroidism due to acquired atrophy of thyroid  Comments:  Although she doesn't recall prior treatment, there is a note in her chart that prior treatment resulted in tachycardia.  Labs suggests waxing and waning course.  Orders:  -     TSH; Future  -     T4, free; Future  -     TSH  -     T4, free    Cough  Comments:  Likely resolving URI; call if worsens      Type 2 diabetes mellitus without complication, without long-term current use of insulin  -     Comprehensive metabolic panel; Future  -     Hemoglobin A1c; Future  -     Lipid panel; Future  -     Microalbumin/creatinine urine ratio; Future  -     Urinalysis; Future  -     Comprehensive metabolic panel  -     Hemoglobin A1c  -     Lipid panel  -     Microalbumin/creatinine urine ratio  -     Urinalysis

## 2019-01-11 LAB
ALBUMIN SERPL-MCNC: 4.5 G/DL (ref 3.5–4.7)
ALBUMIN/GLOB SERPL: 1.7 {RATIO} (ref 1.2–2.2)
ALP SERPL-CCNC: 48 IU/L (ref 39–117)
ALT SERPL-CCNC: 19 IU/L (ref 0–32)
AST SERPL-CCNC: 23 IU/L (ref 0–40)
BILIRUB SERPL-MCNC: 1.2 MG/DL (ref 0–1.2)
BUN SERPL-MCNC: 15 MG/DL (ref 8–27)
BUN/CREAT SERPL: 21 (ref 12–28)
CALCIUM SERPL-MCNC: 9.7 MG/DL (ref 8.7–10.3)
CHLORIDE SERPL-SCNC: 104 MMOL/L (ref 96–106)
CHOLEST SERPL-MCNC: 188 MG/DL (ref 100–199)
CO2 SERPL-SCNC: 24 MMOL/L (ref 20–29)
CREAT SERPL-MCNC: 0.72 MG/DL (ref 0.57–1)
CREAT UR-MCNC: NORMAL MG/DL
EGFR IF AFRICAN AMERICAN: 88 ML/MIN/1.73
EST. GFR  (NON AFRICAN AMERICAN): 76 ML/MIN/1.73
GLOBULIN SER CALC-MCNC: 2.6 G/DL (ref 1.5–4.5)
GLUCOSE SERPL-MCNC: 131 MG/DL (ref 65–99)
GLUCOSE UR QL: NORMAL
HBA1C MFR BLD: 6.4 % (ref 4.8–5.6)
HDLC SERPL-MCNC: 54 MG/DL
KETONES UR QL STRIP: NORMAL
LDLC SERPL CALC-MCNC: 112 MG/DL (ref 0–99)
MICROALBUMIN UR-MCNC: NORMAL
PH UR STRIP: NORMAL [PH]
POTASSIUM SERPL-SCNC: 4.3 MMOL/L (ref 3.5–5.2)
PROT SERPL-MCNC: 7.1 G/DL (ref 6–8.5)
PROT UR QL STRIP: NORMAL
SODIUM SERPL-SCNC: 144 MMOL/L (ref 134–144)
SP GR UR: NORMAL
T4 FREE SERPL-MCNC: 1.02 NG/DL (ref 0.82–1.77)
TRIGL SERPL-MCNC: 111 MG/DL (ref 0–149)
TSH SERPL DL<=0.005 MIU/L-ACNC: 9.67 UIU/ML (ref 0.45–4.5)
VLDLC SERPL CALC-MCNC: 22 MG/DL (ref 5–40)

## 2019-01-21 ENCOUNTER — OFFICE VISIT (OUTPATIENT)
Dept: FAMILY MEDICINE | Facility: CLINIC | Age: 84
End: 2019-01-21
Payer: MEDICARE

## 2019-01-21 VITALS
DIASTOLIC BLOOD PRESSURE: 70 MMHG | WEIGHT: 119 LBS | OXYGEN SATURATION: 97 % | SYSTOLIC BLOOD PRESSURE: 104 MMHG | BODY MASS INDEX: 19.13 KG/M2 | HEART RATE: 79 BPM | HEIGHT: 66 IN

## 2019-01-21 DIAGNOSIS — E03.4 HYPOTHYROIDISM DUE TO ACQUIRED ATROPHY OF THYROID: Primary | ICD-10-CM

## 2019-01-21 DIAGNOSIS — E11.9 TYPE 2 DIABETES MELLITUS WITHOUT COMPLICATION, WITHOUT LONG-TERM CURRENT USE OF INSULIN: ICD-10-CM

## 2019-01-21 DIAGNOSIS — Z23 NEED FOR PROPHYLACTIC VACCINATION AND INOCULATION AGAINST INFLUENZA: ICD-10-CM

## 2019-01-21 PROCEDURE — 90662 IIV NO PRSV INCREASED AG IM: CPT | Mod: ,,, | Performed by: INTERNAL MEDICINE

## 2019-01-21 PROCEDURE — 99213 OFFICE O/P EST LOW 20 MIN: CPT | Mod: 25,,, | Performed by: INTERNAL MEDICINE

## 2019-01-21 PROCEDURE — 99213 PR OFFICE/OUTPT VISIT, EST, LEVL III, 20-29 MIN: ICD-10-PCS | Mod: 25,,, | Performed by: INTERNAL MEDICINE

## 2019-01-21 PROCEDURE — G0008 ADMIN INFLUENZA VIRUS VAC: HCPCS | Mod: ,,, | Performed by: INTERNAL MEDICINE

## 2019-01-21 PROCEDURE — G0008 FLU VACCINE - HIGH DOSE (65+) PRESERVATIVE FREE IM: ICD-10-PCS | Mod: ,,, | Performed by: INTERNAL MEDICINE

## 2019-01-21 PROCEDURE — 90662 FLU VACCINE - HIGH DOSE (65+) PRESERVATIVE FREE IM: ICD-10-PCS | Mod: ,,, | Performed by: INTERNAL MEDICINE

## 2019-01-21 NOTE — PROGRESS NOTES
Subjective:       Patient ID: Jennifer Alarcon is a 86 y.o. female.    Chief Complaint: Hypothyroidism (3/m f/u, labs)    Here to review recent labs.  In review of her chart, she has apparently had an abnormal TSH going back about 10 years but prior attempts to use levothyroxine resulted in tachycardia.  I see a TSH as high as 12.4 from 2015.  She was in the hospital at the end of March and TSH was 5.77.   She has diet controlled diabetes        Thyroid Problem   Presents for initial visit. Symptoms include anxiety, dry skin (not new), fatigue (mostly in AM; feels better in the afternoons) and hair loss. Patient reports no cold intolerance, constipation, diarrhea, heat intolerance, leg swelling, menstrual problem, palpitations, tremors, weight gain or weight loss. The symptoms have been worsening. Her past medical history is significant for atrial fibrillation and diabetes.   Diabetes   She presents for her follow-up diabetic visit. She has type 2 diabetes mellitus. Her disease course has been stable. Hypoglycemia symptoms include nervousness/anxiousness. Pertinent negatives for hypoglycemia include no confusion, dizziness, headaches, seizures, speech difficulty or tremors. Associated symptoms include fatigue (mostly in AM; feels better in the afternoons). Pertinent negatives for diabetes include no chest pain, no polydipsia, no polyuria, no weakness and no weight loss. There are no hypoglycemic complications. Current diabetic treatment includes diet. She is compliant with treatment all of the time. An ACE inhibitor/angiotensin II receptor blocker is not being taken. Eye exam is current.     Review of Systems   Constitutional: Positive for fatigue (mostly in AM; feels better in the afternoons). Negative for chills, fever, unexpected weight change, weight gain and weight loss.   HENT: Negative for congestion, hearing loss, postnasal drip, rhinorrhea, trouble swallowing and voice change.    Eyes: Negative for  photophobia and visual disturbance.   Respiratory: Positive for shortness of breath. Negative for apnea, cough, choking, chest tightness and wheezing.    Cardiovascular: Negative for chest pain, palpitations and leg swelling.   Gastrointestinal: Negative for abdominal pain, blood in stool, constipation, diarrhea, nausea, rectal pain and vomiting.   Endocrine: Negative for cold intolerance, heat intolerance, polydipsia and polyuria.   Genitourinary: Negative for decreased urine volume, difficulty urinating, dysuria, frequency, genital sores, hematuria, menstrual problem, pelvic pain, urgency, vaginal bleeding and vaginal discharge.   Musculoskeletal: Positive for arthralgias. Negative for back pain, gait problem, joint swelling, myalgias, neck pain and neck stiffness.   Skin: Negative for color change, rash and wound.   Allergic/Immunologic: Negative for environmental allergies and food allergies.   Neurological: Negative for dizziness, tremors, seizures, syncope, facial asymmetry, speech difficulty, weakness, light-headedness, numbness and headaches.   Hematological: Negative for adenopathy. Does not bruise/bleed easily.   Psychiatric/Behavioral: Negative for confusion, hallucinations, sleep disturbance and suicidal ideas. The patient is nervous/anxious.        Past Medical History:   Diagnosis Date    Atrial fibrillation, controlled     Colon cancer     Diabetes mellitus, type 2     Hypertension     Hypothyroidism       Past Surgical History:   Procedure Laterality Date    APPENDECTOMY      BREAST SURGERY      lt breast bx    CHOLECYSTECTOMY      COLON SURGERY      EYE SURGERY         Family History   Problem Relation Age of Onset    Diabetes Mother     Heart disease Mother     Heart disease Father     Diabetes Father        Social History     Socioeconomic History    Marital status:      Spouse name: None    Number of children: None    Years of education: None    Highest education level:  "None   Social Needs    Financial resource strain: None    Food insecurity - worry: None    Food insecurity - inability: None    Transportation needs - medical: None    Transportation needs - non-medical: None   Occupational History    Occupation: retired   Tobacco Use    Smoking status: Never Smoker    Smokeless tobacco: Never Used   Substance and Sexual Activity    Alcohol use: No    Drug use: No    Sexual activity: No   Other Topics Concern    None   Social History Narrative    Live alone       Current Outpatient Medications   Medication Sig Dispense Refill    metoprolol tartrate (LOPRESSOR) 50 MG tablet 1 tablet 2 (two) times daily.      mupirocin (BACTROBAN) 2 % ointment BLANCA A SMALL AMOUNT AA QHS  3    rivaroxaban (XARELTO) 20 mg Tab Take 20 mg by mouth daily with dinner or evening meal.      SENNA 8.6 mg tablet 1 tablet 2 (two) times daily.  0    triamcinolone acetonide 0.1% (KENALOG) 0.1 % paste triamcinolone 0.1 % topical ointment and dimethicone 5 % topical cream       No current facility-administered medications for this visit.        Review of patient's allergies indicates:   Allergen Reactions    Cortisone      abd pain    Nitrofurantoin monohyd/m-cryst      leg cramps     Objective:    HPI     Hypothyroidism      Additional comments: 3/m f/u, labs          Last edited by Sharita Henao LPN on 1/21/2019 10:42 AM. (History)      Blood pressure 104/70, pulse 79, height 5' 5.5" (1.664 m), weight 54 kg (119 lb), SpO2 97 %. Body mass index is 19.5 kg/m².   Physical Exam   Constitutional: She appears well-developed. No distress.   HENT:   Nose: Nose normal.   Mouth/Throat: Oropharynx is clear and moist.   Eyes: Conjunctivae are normal. Right eye exhibits no discharge. Left eye exhibits no discharge. No scleral icterus.   Neck: Carotid bruit is not present.   Cardiovascular: Normal rate and normal heart sounds. An irregularly irregular rhythm present.   No murmur heard.  Pulmonary/Chest: " Effort normal and breath sounds normal. No respiratory distress. She has no decreased breath sounds. She has no wheezes. She has no rhonchi. She has no rales.   Abdominal: Soft. She exhibits no distension. There is no tenderness. There is no rebound and no guarding.   Musculoskeletal: She exhibits no edema.   Neurological: She is alert. She displays no tremor. Gait (ambulation with cane) abnormal.   Skin: Skin is warm and dry.   Psychiatric: She has a normal mood and affect. Her speech is normal.   Nursing note and vitals reviewed.        No visits with results within 1 Month(s) from this visit.   Latest known visit with results is:   Office Visit on 10/01/2018   Component Date Value Ref Range Status    Glucose 01/10/2019 131* 65 - 99 mg/dL Final    BUN, Bld 01/10/2019 15  8 - 27 mg/dL Final    Creatinine 01/10/2019 0.72  0.57 - 1.00 mg/dL Final    eGFR if non African American 01/10/2019 76  >59 mL/min/1.73 Final    eGFR if  01/10/2019 88  >59 mL/min/1.73 Final    BUN/Creatinine Ratio 01/10/2019 21  12 - 28 Final    Sodium 01/10/2019 144  134 - 144 mmol/L Final    Potassium 01/10/2019 4.3  3.5 - 5.2 mmol/L Final    Chloride 01/10/2019 104  96 - 106 mmol/L Final    CO2 01/10/2019 24  20 - 29 mmol/L Final    Calcium 01/10/2019 9.7  8.7 - 10.3 mg/dL Final    Total Protein 01/10/2019 7.1  6.0 - 8.5 g/dL Final    Albumin 01/10/2019 4.5  3.5 - 4.7 g/dL Final    Globulin, Total 01/10/2019 2.6  1.5 - 4.5 g/dL Final    Albumin/Globulin Ratio 01/10/2019 1.7  1.2 - 2.2 Final    Total Bilirubin 01/10/2019 1.2  0.0 - 1.2 mg/dL Final    Alkaline Phosphatase 01/10/2019 48  39 - 117 IU/L Final    AST 01/10/2019 23  0 - 40 IU/L Final    ALT 01/10/2019 19  0 - 32 IU/L Final    Hemoglobin A1C 01/10/2019 6.4* 4.8 - 5.6 % Final    Comment:          Prediabetes: 5.7 - 6.4           Diabetes: >6.4           Glycemic control for adults with diabetes: <7.0      Cholesterol 01/10/2019 188  100 - 199 mg/dL  Final    Triglycerides 01/10/2019 111  0 - 149 mg/dL Final    HDL 01/10/2019 54  >39 mg/dL Final    VLDL Cholesterol [place] 01/10/2019 22  5 - 40 mg/dL Final    LDL Calculated 01/10/2019 112* 0 - 99 mg/dL Final    Creatinine, Random Ur 01/10/2019 CANCELED  mg/dL Final-Edited    Comment: [facility] was unable to collect sufficient specimen to perform the  following test(s), and is providing the patient with re-collection  instructions.    Result canceled by the ancillary.      Microalb, Ur 01/10/2019 CANCELED   Final-Edited    Comment: Test not performed    Result canceled by the ancillary.      Specific [place], UA 01/10/2019 CANCELED   Final-Edited    Comment: [facility] was unable to collect sufficient specimen to perform the  following test(s), and is providing the patient with re-collection  instructions.    Result canceled by the ancillary.      pH, UA 01/10/2019 CANCELED   Final-Edited    Comment: Test not performed    Result canceled by the ancillary.      Protein, UA 01/10/2019 CANCELED   Final-Edited    Comment: Test not performed    Result canceled by the ancillary.      Glucose, UA 01/10/2019 CANCELED   Final-Edited    Comment: Test not performed    Result canceled by the ancillary.      Ketones, UA 01/10/2019 CANCELED   Final-Edited    Comment: Test not performed    Result canceled by the ancillary.      TSH 01/10/2019 9.670* 0.450 - 4.500 uIU/mL Final    T4, Free 01/10/2019 1.02  0.82 - 1.77 ng/dL Final   ]  Assessment:       1. Hypothyroidism due to acquired atrophy of thyroid    2. Need for prophylactic vaccination and inoculation against influenza    3. Type 2 diabetes mellitus without complication, without long-term current use of insulin        Plan:       Jennifer was seen today for hypothyroidism.    Diagnoses and all orders for this visit:    Hypothyroidism due to acquired atrophy of thyroid  Comments:  Will continue to monitor off meds for now    Need for prophylactic vaccination and  inoculation against influenza  -     Influenza - High Dose (65+) (PF) (IM)    Type 2 diabetes mellitus without complication, without long-term current use of insulin  Comments:  Well controlled with diet.  She has never been on a statin and at her age, I'm not sure there would be benefit in starting one now.

## 2019-06-17 ENCOUNTER — OFFICE VISIT (OUTPATIENT)
Dept: FAMILY MEDICINE | Facility: CLINIC | Age: 84
End: 2019-06-17
Payer: MEDICARE

## 2019-06-17 VITALS
TEMPERATURE: 98 F | DIASTOLIC BLOOD PRESSURE: 60 MMHG | SYSTOLIC BLOOD PRESSURE: 102 MMHG | HEIGHT: 66 IN | BODY MASS INDEX: 19.13 KG/M2 | WEIGHT: 119 LBS | OXYGEN SATURATION: 98 % | HEART RATE: 88 BPM

## 2019-06-17 DIAGNOSIS — E03.4 HYPOTHYROIDISM DUE TO ACQUIRED ATROPHY OF THYROID: ICD-10-CM

## 2019-06-17 DIAGNOSIS — E11.9 TYPE 2 DIABETES MELLITUS WITHOUT COMPLICATION, WITHOUT LONG-TERM CURRENT USE OF INSULIN: Primary | ICD-10-CM

## 2019-06-17 DIAGNOSIS — B35.1 ONYCHOMYCOSIS OF TOENAIL: ICD-10-CM

## 2019-06-17 PROCEDURE — 99213 PR OFFICE/OUTPT VISIT, EST, LEVL III, 20-29 MIN: ICD-10-PCS | Mod: ,,, | Performed by: INTERNAL MEDICINE

## 2019-06-17 PROCEDURE — 99213 OFFICE O/P EST LOW 20 MIN: CPT | Mod: ,,, | Performed by: INTERNAL MEDICINE

## 2019-06-17 NOTE — PROGRESS NOTES
Subjective:       Patient ID: Jennifer Alarcon is a 86 y.o. female.    Chief Complaint: Diabetes (continuity of care 5 month followup) and Hypertension    Here for routine follow up.  In review of her chart, she has apparently had an abnormal TSH going back about 10 years but prior attempts to use levothyroxine resulted in tachycardia.  I see a TSH as high as 12.4 from 2015.  She was in the hospital at the end of March and TSH was 5.77.   She has diet controlled diabetes      Thyroid Problem   Presents for initial visit. Symptoms include anxiety, constipation, dry skin (not new), fatigue and hair loss. Patient reports no cold intolerance, diarrhea, heat intolerance, leg swelling, menstrual problem, palpitations, tremors, weight gain or weight loss. Her past medical history is significant for atrial fibrillation and diabetes.   Diabetes   She presents for her follow-up diabetic visit. She has type 2 diabetes mellitus. Her disease course has been stable. Hypoglycemia symptoms include nervousness/anxiousness. Pertinent negatives for hypoglycemia include no confusion, dizziness, headaches, seizures, speech difficulty or tremors. Associated symptoms include fatigue and weakness. Pertinent negatives for diabetes include no chest pain, no polydipsia, no polyuria and no weight loss. There are no hypoglycemic complications. Current diabetic treatment includes diet. She is compliant with treatment all of the time. An ACE inhibitor/angiotensin II receptor blocker is not being taken. Eye exam is current.     Review of Systems   Constitutional: Positive for fatigue. Negative for chills, fever, unexpected weight change, weight gain and weight loss.   HENT: Positive for trouble swallowing. Negative for congestion, hearing loss, postnasal drip, rhinorrhea and voice change.    Eyes: Negative for photophobia and visual disturbance.   Respiratory: Positive for shortness of breath (on exertion). Negative for apnea, cough, choking, chest  tightness and wheezing.    Cardiovascular: Negative for chest pain, palpitations and leg swelling.   Gastrointestinal: Positive for constipation. Negative for abdominal pain, blood in stool, diarrhea, nausea, rectal pain and vomiting.   Endocrine: Negative for cold intolerance, heat intolerance, polydipsia and polyuria.   Genitourinary: Positive for frequency. Negative for decreased urine volume, difficulty urinating, dysuria, genital sores, hematuria, menstrual problem, pelvic pain, urgency, vaginal bleeding and vaginal discharge.   Musculoskeletal: Positive for joint swelling (right knee swelling). Negative for arthralgias (right knee pain), back pain, gait problem, myalgias, neck pain and neck stiffness.   Skin: Negative for color change, rash and wound.   Allergic/Immunologic: Negative for environmental allergies and food allergies.   Neurological: Positive for weakness. Negative for dizziness, tremors, seizures, syncope, facial asymmetry, speech difficulty, light-headedness, numbness and headaches.   Hematological: Negative for adenopathy. Does not bruise/bleed easily.   Psychiatric/Behavioral: Negative for confusion, hallucinations, sleep disturbance and suicidal ideas. The patient is nervous/anxious.        Past Medical History:   Diagnosis Date    Atrial fibrillation, controlled     Bullous pemphigoid     Colon cancer     Diabetes mellitus, type 2     Eczema     Hypertension     Hypothyroidism       Past Surgical History:   Procedure Laterality Date    APPENDECTOMY      BREAST SURGERY      lt breast bx    CHOLECYSTECTOMY      COLON SURGERY      EYE SURGERY         Family History   Problem Relation Age of Onset    Diabetes Mother     Heart disease Mother     Heart disease Father     Diabetes Father        Social History     Socioeconomic History    Marital status:      Spouse name: Not on file    Number of children: Not on file    Years of education: Not on file    Highest education  level: Not on file   Occupational History    Occupation: retired   Social Needs    Financial resource strain: Not on file    Food insecurity:     Worry: Not on file     Inability: Not on file    Transportation needs:     Medical: Not on file     Non-medical: Not on file   Tobacco Use    Smoking status: Never Smoker    Smokeless tobacco: Never Used   Substance and Sexual Activity    Alcohol use: No    Drug use: No    Sexual activity: Never   Lifestyle    Physical activity:     Days per week: Not on file     Minutes per session: Not on file    Stress: Only a little   Relationships    Social connections:     Talks on phone: Not on file     Gets together: Not on file     Attends [employer] service: Not on file     Active member of club or organization: Not on file     Attends meetings of clubs or organizations: Not on file     Relationship status: Not on file   Other Topics Concern    Not on file   Social History Narrative    Live alone       Current Outpatient Medications   Medication Sig Dispense Refill    metoprolol tartrate (LOPRESSOR) 50 MG tablet 1 tablet 2 (two) times daily.      mupirocin (BACTROBAN) 2 % ointment BLANCA A SMALL AMOUNT AA QHS  3    rivaroxaban (XARELTO) 20 mg Tab Take 20 mg by mouth daily with dinner or evening meal.      SENNA 8.6 mg tablet 1 tablet 2 (two) times daily.  0    triamcinolone acetonide 0.1% (KENALOG) 0.1 % paste triamcinolone 0.1 % topical ointment and dimethicone 5 % topical cream       No current facility-administered medications for this visit.        Review of patient's allergies indicates:   Allergen Reactions    Cortisone      abd pain    Nitrofurantoin monohyd/m-cryst      leg cramps     Objective:    HPI     Diabetes      Additional comments: continuity of care 5 month followup          Last edited by Mario Pichardo MA on 6/17/2019  9:36 AM. (History)      Blood pressure 102/60, pulse 88, temperature 97.7 °F (36.5 °C), temperature source Temporal, height 5'  "5.5" (1.664 m), weight 54 kg (119 lb), SpO2 98 %. Body mass index is 19.5 kg/m².   Physical Exam   Constitutional: She appears well-developed. No distress.   HENT:   Nose: Nose normal.   Mouth/Throat: Oropharynx is clear and moist.   Eyes: Conjunctivae are normal. Right eye exhibits no discharge. Left eye exhibits no discharge. No scleral icterus.   Neck: Carotid bruit is not present.   Cardiovascular: Normal rate and normal heart sounds. An irregularly irregular rhythm present.   No murmur heard.  Pulses:       Dorsalis pedis pulses are 2+ on the right side, and 2+ on the left side.   Pulmonary/Chest: Effort normal and breath sounds normal. No respiratory distress. She has no decreased breath sounds. She has no wheezes. She has no rhonchi. She has no rales.   Abdominal: Soft. She exhibits no distension. There is no tenderness. There is no rebound and no guarding.   Musculoskeletal: She exhibits no edema.        Left foot: There is deformity (hallux valgus).   Feet:   Right Foot:   Protective Sensation: 10 sites tested. 10 sites sensed.   Skin Integrity: Negative for ulcer, blister, skin breakdown (+onychomycosis), erythema, warmth, callus or dry skin.   Left Foot:   Protective Sensation: 10 sites tested. 10 sites sensed.   Skin Integrity: Negative for ulcer, blister, skin breakdown (+onychomycosis), erythema, warmth, callus or dry skin.   Neurological: She is alert. She displays no tremor. Gait (ambulation with cane) abnormal.   Skin: Skin is warm and dry.   Psychiatric: She has a normal mood and affect. Her speech is normal.   Nursing note and vitals reviewed.          Assessment:       1. Type 2 diabetes mellitus without complication, without long-term current use of insulin    2. Hypothyroidism due to acquired atrophy of thyroid    3. Onychomycosis of toenail        Plan:       Jennifer was seen today for diabetes and hypertension.    Diagnoses and all orders for this visit:    Type 2 diabetes mellitus without " complication, without long-term current use of insulin  -     Hemoglobin A1c; Future  -     Comprehensive metabolic panel; Future  -     Hemoglobin A1c  -     Comprehensive metabolic panel    Hypothyroidism due to acquired atrophy of thyroid  -     TSH; Future  -     T4, free; Future  -     TSH  -     T4, free    Onychomycosis of toenail  Comments:  Discussed Podiatyr eval to trim nails but she wishes to defer for now.  Given contact infor if she changes her mind

## 2019-07-12 ENCOUNTER — TELEPHONE (OUTPATIENT)
Dept: FAMILY MEDICINE | Facility: CLINIC | Age: 84
End: 2019-07-12

## 2019-07-12 LAB
ALBUMIN SERPL-MCNC: 3.9 G/DL (ref 3.5–4.7)
ALBUMIN/GLOB SERPL: 1.4 {RATIO} (ref 1.2–2.2)
ALP SERPL-CCNC: 50 IU/L (ref 39–117)
ALT SERPL-CCNC: 15 IU/L (ref 0–32)
AST SERPL-CCNC: 20 IU/L (ref 0–40)
BILIRUB SERPL-MCNC: 1 MG/DL (ref 0–1.2)
BUN SERPL-MCNC: 14 MG/DL (ref 8–27)
BUN/CREAT SERPL: 19 (ref 12–28)
CALCIUM SERPL-MCNC: 9.6 MG/DL (ref 8.7–10.3)
CHLORIDE SERPL-SCNC: 101 MMOL/L (ref 96–106)
CO2 SERPL-SCNC: 27 MMOL/L (ref 20–29)
CREAT SERPL-MCNC: 0.75 MG/DL (ref 0.57–1)
GLOBULIN SER CALC-MCNC: 2.7 G/DL (ref 1.5–4.5)
GLUCOSE SERPL-MCNC: 130 MG/DL (ref 65–99)
HBA1C MFR BLD: 6.9 % (ref 4.8–5.6)
POTASSIUM SERPL-SCNC: 4.5 MMOL/L (ref 3.5–5.2)
PROT SERPL-MCNC: 6.6 G/DL (ref 6–8.5)
SODIUM SERPL-SCNC: 143 MMOL/L (ref 134–144)
T4 FREE SERPL-MCNC: 1.01 NG/DL (ref 0.82–1.77)
TSH SERPL DL<=0.005 MIU/L-ACNC: 7.93 UIU/ML (ref 0.45–4.5)

## 2019-07-12 NOTE — TELEPHONE ENCOUNTER
----- Message from Tyrel Gonzales Jr., MD sent at 7/12/2019 11:01 AM CDT -----  Controlled    ----- Message -----  From: Mario Pichardo MA  Sent: 7/12/2019  10:26 AM  To: Tyrel Gonzales Jr., MD    Did you mean to say that the diabetes is uncontrolled or controlled.  ----- Message -----  From: Tyrel Gonzales Jr., MD  Sent: 7/12/2019  10:16 AM  To: Tyrel Gonzales Staff    Please call the patient regarding her abnormal result.  Diabetes is controlled.  Thyroid is improving.

## 2019-12-02 ENCOUNTER — OFFICE VISIT (OUTPATIENT)
Dept: FAMILY MEDICINE | Facility: CLINIC | Age: 84
End: 2019-12-02
Payer: MEDICARE

## 2019-12-02 VITALS
BODY MASS INDEX: 19.29 KG/M2 | OXYGEN SATURATION: 97 % | HEIGHT: 66 IN | WEIGHT: 120 LBS | DIASTOLIC BLOOD PRESSURE: 54 MMHG | HEART RATE: 86 BPM | TEMPERATURE: 98 F | SYSTOLIC BLOOD PRESSURE: 86 MMHG

## 2019-12-02 DIAGNOSIS — Z23 NEED FOR PROPHYLACTIC VACCINATION AND INOCULATION AGAINST INFLUENZA: ICD-10-CM

## 2019-12-02 DIAGNOSIS — E03.4 HYPOTHYROIDISM DUE TO ACQUIRED ATROPHY OF THYROID: ICD-10-CM

## 2019-12-02 DIAGNOSIS — B35.1 ONYCHOMYCOSIS OF GREAT TOE: ICD-10-CM

## 2019-12-02 DIAGNOSIS — I48.91 ATRIAL FIBRILLATION, CONTROLLED: ICD-10-CM

## 2019-12-02 DIAGNOSIS — E11.9 TYPE 2 DIABETES MELLITUS WITHOUT COMPLICATION, WITHOUT LONG-TERM CURRENT USE OF INSULIN: Primary | ICD-10-CM

## 2019-12-02 PROCEDURE — G0008 ADMIN INFLUENZA VIRUS VAC: HCPCS | Mod: S$GLB,,, | Performed by: INTERNAL MEDICINE

## 2019-12-02 PROCEDURE — 99214 OFFICE O/P EST MOD 30 MIN: CPT | Mod: 25,S$GLB,, | Performed by: INTERNAL MEDICINE

## 2019-12-02 PROCEDURE — 1159F PR MEDICATION LIST DOCUMENTED IN MEDICAL RECORD: ICD-10-PCS | Mod: S$GLB,,, | Performed by: INTERNAL MEDICINE

## 2019-12-02 PROCEDURE — 1159F MED LIST DOCD IN RCRD: CPT | Mod: S$GLB,,, | Performed by: INTERNAL MEDICINE

## 2019-12-02 PROCEDURE — 90662 FLU VACCINE - HIGH DOSE (65+) PRESERVATIVE FREE IM: ICD-10-PCS | Mod: S$GLB,,, | Performed by: INTERNAL MEDICINE

## 2019-12-02 PROCEDURE — 1170F PR FUNCTIONAL STATUS ASSESSED: ICD-10-PCS | Mod: S$GLB,,, | Performed by: INTERNAL MEDICINE

## 2019-12-02 PROCEDURE — G0008 FLU VACCINE - HIGH DOSE (65+) PRESERVATIVE FREE IM: ICD-10-PCS | Mod: S$GLB,,, | Performed by: INTERNAL MEDICINE

## 2019-12-02 PROCEDURE — 90662 IIV NO PRSV INCREASED AG IM: CPT | Mod: S$GLB,,, | Performed by: INTERNAL MEDICINE

## 2019-12-02 PROCEDURE — 1126F PR PAIN SEVERITY QUANTIFIED, NO PAIN PRESENT: ICD-10-PCS | Mod: S$GLB,,, | Performed by: INTERNAL MEDICINE

## 2019-12-02 PROCEDURE — 1126F AMNT PAIN NOTED NONE PRSNT: CPT | Mod: S$GLB,,, | Performed by: INTERNAL MEDICINE

## 2019-12-02 PROCEDURE — 99214 PR OFFICE/OUTPT VISIT, EST, LEVL IV, 30-39 MIN: ICD-10-PCS | Mod: 25,S$GLB,, | Performed by: INTERNAL MEDICINE

## 2019-12-02 PROCEDURE — 1170F FXNL STATUS ASSESSED: CPT | Mod: S$GLB,,, | Performed by: INTERNAL MEDICINE

## 2019-12-02 NOTE — PROGRESS NOTES
Subjective:       Patient ID: Jennifer Alarcon is a 87 y.o. female.    Chief Complaint: Diabetes and Thyroid Problem    Here for routine follow up.  She is concerned about the appearance of her toenails.  One of them felt like it was becoming ingrown recently.  She has a hard time trimmimg her nails herself.    Thyroid Problem   Presents for initial visit. Symptoms include anxiety, dry skin (not new) and hair loss. Patient reports no cold intolerance, constipation, diarrhea, fatigue, heat intolerance, leg swelling, menstrual problem, palpitations, tremors, visual change, weight gain or weight loss. (She has a history of abnormal TSH going back about 10 years but prior attempts to use levothyroxine resulted in tachycardia. ) Her past medical history is significant for atrial fibrillation and diabetes.   Diabetes   She presents for her follow-up diabetic visit. She has type 2 diabetes mellitus. Her disease course has been stable. Hypoglycemia symptoms include nervousness/anxiousness. Pertinent negatives for hypoglycemia include no confusion, dizziness, headaches, seizures, speech difficulty or tremors. Associated symptoms include weakness. Pertinent negatives for diabetes include no blurred vision, no chest pain, no fatigue, no foot paresthesias, no foot ulcerations, no polydipsia, no polyuria, no visual change and no weight loss. There are no hypoglycemic complications. Current diabetic treatment includes diet. She is compliant with treatment all of the time. An ACE inhibitor/angiotensin II receptor blocker is not being taken (BP will not tolerate it). Eye exam is current.     Review of Systems   Constitutional: Negative for chills, fatigue, fever, unexpected weight change, weight gain and weight loss.   HENT: Positive for hearing loss. Negative for congestion, postnasal drip, rhinorrhea, trouble swallowing and voice change.    Eyes: Negative for blurred vision, photophobia and visual disturbance.   Respiratory:  Positive for cough and shortness of breath. Negative for apnea, choking, chest tightness and wheezing.    Cardiovascular: Negative for chest pain, palpitations and leg swelling.   Gastrointestinal: Negative for abdominal pain, blood in stool, constipation, diarrhea, nausea, rectal pain and vomiting.   Endocrine: Negative for cold intolerance, heat intolerance, polydipsia and polyuria.   Genitourinary: Negative for decreased urine volume, difficulty urinating, dysuria, frequency, genital sores, hematuria, menstrual problem, pelvic pain, urgency, vaginal bleeding and vaginal discharge.   Musculoskeletal: Positive for arthralgias and joint swelling. Negative for back pain, gait problem, myalgias (feet), neck pain and neck stiffness.   Skin: Negative for color change, rash and wound.   Allergic/Immunologic: Negative for environmental allergies and food allergies.   Neurological: Positive for weakness. Negative for dizziness, tremors, seizures, syncope, facial asymmetry, speech difficulty, light-headedness, numbness and headaches.   Hematological: Negative for adenopathy. Does not bruise/bleed easily.   Psychiatric/Behavioral: Negative for confusion, hallucinations, sleep disturbance and suicidal ideas. The patient is nervous/anxious.        Past Medical History:   Diagnosis Date    Atrial fibrillation, controlled     Bullous pemphigoid     Colon cancer     Diabetes mellitus, type 2     Eczema     Hypertension     Hypothyroidism       Past Surgical History:   Procedure Laterality Date    APPENDECTOMY      BREAST SURGERY      lt breast bx    CHOLECYSTECTOMY      COLON SURGERY      EYE SURGERY         Family History   Problem Relation Age of Onset    Diabetes Mother     Heart disease Mother     Heart disease Father     Diabetes Father        Social History     Socioeconomic History    Marital status:      Spouse name: Not on file    Number of children: Not on file    Years of education: Not on  "file    Highest education level: Not on file   Occupational History    Occupation: retired   Social Needs    Financial resource strain: Not on file    Food insecurity:     Worry: Not on file     Inability: Not on file    Transportation needs:     Medical: Not on file     Non-medical: Not on file   Tobacco Use    Smoking status: Never Smoker    Smokeless tobacco: Never Used   Substance and Sexual Activity    Alcohol use: No    Drug use: No    Sexual activity: Never   Lifestyle    Physical activity:     Days per week: Not on file     Minutes per session: Not on file    Stress: Only a little   Relationships    Social connections:     Talks on phone: Not on file     Gets together: Not on file     Attends Anab[employer]ism service: Not on file     Active member of club or organization: Not on file     Attends meetings of clubs or organizations: Not on file     Relationship status: Not on file   Other Topics Concern    Not on file   Social History Narrative    Live alone       Current Outpatient Medications   Medication Sig Dispense Refill    metoprolol tartrate (LOPRESSOR) 50 MG tablet 1 tablet 2 (two) times daily.      mupirocin (BACTROBAN) 2 % ointment BLANCA A SMALL AMOUNT AA QHS  3    rivaroxaban (XARELTO) 20 mg Tab Take 20 mg by mouth daily with dinner or evening meal.      triamcinolone acetonide 0.1% (KENALOG) 0.1 % paste triamcinolone 0.1 % topical ointment and dimethicone 5 % topical cream       No current facility-administered medications for this visit.        Review of patient's allergies indicates:   Allergen Reactions    Cortisone      abd pain    Nitrofurantoin monohyd/m-cryst      leg cramps     Objective:      Blood pressure (!) 86/54, pulse 86, temperature 98.1 °F (36.7 °C), temperature source Temporal, height 5' 5.5" (1.664 m), weight 54.4 kg (120 lb), SpO2 97 %. Body mass index is 19.67 kg/m².   Physical Exam   Constitutional: She appears well-developed. No distress.   HENT:   Nose: Nose " normal.   Mouth/Throat: Oropharynx is clear and moist.   Eyes: Conjunctivae are normal. Right eye exhibits no discharge. Left eye exhibits no discharge. No scleral icterus.   Neck: Carotid bruit is not present.   Cardiovascular: Normal rate and normal heart sounds. An irregularly irregular rhythm present.   No murmur heard.  Pulses:       Dorsalis pedis pulses are 2+ on the right side, and 2+ on the left side.   Pulmonary/Chest: Effort normal and breath sounds normal. No respiratory distress. She has no decreased breath sounds. She has no wheezes. She has no rhonchi. She has no rales.   Abdominal: Soft. She exhibits no distension. There is no tenderness. There is no rebound and no guarding.   Musculoskeletal: She exhibits no edema.        Left foot: There is deformity (hallux valgus).   Feet:   Right Foot:   Protective Sensation: 10 sites tested. 10 sites sensed.   Skin Integrity: Negative for ulcer, blister, skin breakdown (+onychomycosis), erythema, warmth, callus or dry skin.   Left Foot:   Protective Sensation: 10 sites tested. 10 sites sensed.   Skin Integrity: Negative for ulcer, blister, skin breakdown (+onychomycosis), erythema, warmth, callus or dry skin.   Neurological: She is alert. She displays no tremor. Gait (ambulation with cane) abnormal.   Skin: Skin is warm and dry.   Psychiatric: She has a normal mood and affect. Her speech is normal.   Nursing note and vitals reviewed.          Assessment:       1. Type 2 diabetes mellitus without complication, without long-term current use of insulin    2. Hypothyroidism due to acquired atrophy of thyroid    3. Atrial fibrillation, controlled    4. Onychomycosis of great toe    5. Need for prophylactic vaccination and inoculation against influenza        Plan:       Jennifer was seen today for diabetes and thyroid problem.    Diagnoses and all orders for this visit:    Type 2 diabetes mellitus without complication, without long-term current use of insulin  -      Hemoglobin A1c; Future  -     Comprehensive metabolic panel; Future  -     Lipid panel; Future  -     Microalbumin/creatinine urine ratio; Future  -     Urinalysis; Future  -     Ambulatory Referral to Podiatry  -     Hemoglobin A1c  -     Comprehensive metabolic panel  -     Lipid panel  -     Microalbumin/creatinine urine ratio  -     Urinalysis    Hypothyroidism due to acquired atrophy of thyroid  -     TSH; Future  -     TSH    Atrial fibrillation, controlled  -     CBC auto differential; Future  -     CBC auto differential    Onychomycosis of great toe  Comments:  Would benefit from professional trimming by Podiatry  Orders:  -     Ambulatory Referral to Podiatry    Need for prophylactic vaccination and inoculation against influenza  -     Influenza - High Dose (65+) (PF) (IM)

## 2019-12-29 ENCOUNTER — HOSPITAL ENCOUNTER (INPATIENT)
Facility: HOSPITAL | Age: 84
LOS: 6 days | Discharge: HOME HEALTH | DRG: 308 | End: 2020-01-05
Attending: EMERGENCY MEDICINE | Admitting: INTERNAL MEDICINE
Payer: MEDICARE

## 2019-12-29 DIAGNOSIS — I50.9 ACUTE ON CHRONIC CONGESTIVE HEART FAILURE, UNSPECIFIED HEART FAILURE TYPE: Primary | ICD-10-CM

## 2019-12-29 DIAGNOSIS — R06.02 SOB (SHORTNESS OF BREATH): ICD-10-CM

## 2019-12-29 DIAGNOSIS — I48.91 ATRIAL FIBRILLATION: ICD-10-CM

## 2019-12-29 DIAGNOSIS — R07.9 CHEST PAIN: ICD-10-CM

## 2019-12-29 DIAGNOSIS — I48.0 PAROXYSMAL ATRIAL FIBRILLATION: ICD-10-CM

## 2019-12-29 LAB
ALBUMIN SERPL BCP-MCNC: 3.8 G/DL (ref 3.5–5.2)
ALP SERPL-CCNC: 45 U/L (ref 55–135)
ALT SERPL W/O P-5'-P-CCNC: 17 U/L (ref 10–44)
ANION GAP SERPL CALC-SCNC: 8 MMOL/L (ref 8–16)
AST SERPL-CCNC: 25 U/L (ref 10–40)
BACTERIA #/AREA URNS HPF: NORMAL /HPF
BASOPHILS # BLD AUTO: 0.05 K/UL (ref 0–0.2)
BASOPHILS NFR BLD: 0.5 % (ref 0–1.9)
BILIRUB SERPL-MCNC: 1.8 MG/DL (ref 0.1–1)
BILIRUB UR QL STRIP: NEGATIVE
BNP SERPL-MCNC: 1185 PG/ML (ref 0–99)
BUN SERPL-MCNC: 23 MG/DL (ref 8–23)
CALCIUM SERPL-MCNC: 9.4 MG/DL (ref 8.7–10.5)
CHLORIDE SERPL-SCNC: 102 MMOL/L (ref 95–110)
CLARITY UR: CLEAR
CO2 SERPL-SCNC: 28 MMOL/L (ref 23–29)
COLOR UR: YELLOW
CREAT SERPL-MCNC: 0.8 MG/DL (ref 0.5–1.4)
DIFFERENTIAL METHOD: ABNORMAL
EOSINOPHIL # BLD AUTO: 0.1 K/UL (ref 0–0.5)
EOSINOPHIL NFR BLD: 1.2 % (ref 0–8)
ERYTHROCYTE [DISTWIDTH] IN BLOOD BY AUTOMATED COUNT: 14.4 % (ref 11.5–14.5)
EST. GFR  (AFRICAN AMERICAN): >60 ML/MIN/1.73 M^2
EST. GFR  (NON AFRICAN AMERICAN): >60 ML/MIN/1.73 M^2
GLUCOSE SERPL-MCNC: 180 MG/DL (ref 70–110)
GLUCOSE UR QL STRIP: NEGATIVE
HCT VFR BLD AUTO: 47.4 % (ref 37–48.5)
HGB BLD-MCNC: 15 G/DL (ref 12–16)
HGB UR QL STRIP: ABNORMAL
HYALINE CASTS #/AREA URNS LPF: 0 /LPF
IMM GRANULOCYTES # BLD AUTO: 0.02 K/UL (ref 0–0.04)
IMM GRANULOCYTES NFR BLD AUTO: 0.2 % (ref 0–0.5)
INR PPP: 1.9
KETONES UR QL STRIP: NEGATIVE
LEUKOCYTE ESTERASE UR QL STRIP: NEGATIVE
LYMPHOCYTES # BLD AUTO: 1.9 K/UL (ref 1–4.8)
LYMPHOCYTES NFR BLD: 20.8 % (ref 18–48)
MCH RBC QN AUTO: 28.9 PG (ref 27–31)
MCHC RBC AUTO-ENTMCNC: 31.6 G/DL (ref 32–36)
MCV RBC AUTO: 91 FL (ref 82–98)
MICROSCOPIC COMMENT: NORMAL
MONOCYTES # BLD AUTO: 0.9 K/UL (ref 0.3–1)
MONOCYTES NFR BLD: 9.5 % (ref 4–15)
NEUTROPHILS # BLD AUTO: 6.3 K/UL (ref 1.8–7.7)
NEUTROPHILS NFR BLD: 67.8 % (ref 38–73)
NITRITE UR QL STRIP: NEGATIVE
NRBC BLD-RTO: 0 /100 WBC
PH UR STRIP: 6 [PH] (ref 5–8)
PLATELET # BLD AUTO: 191 K/UL (ref 150–350)
PMV BLD AUTO: 10.3 FL (ref 9.2–12.9)
POTASSIUM SERPL-SCNC: 4.5 MMOL/L (ref 3.5–5.1)
PROT SERPL-MCNC: 7.4 G/DL (ref 6–8.4)
PROT UR QL STRIP: ABNORMAL
PROTHROMBIN TIME: 20.8 SEC (ref 10.6–14.8)
RBC # BLD AUTO: 5.19 M/UL (ref 4–5.4)
RBC #/AREA URNS HPF: 2 /HPF (ref 0–4)
SODIUM SERPL-SCNC: 138 MMOL/L (ref 136–145)
SP GR UR STRIP: >=1.03 (ref 1–1.03)
TROPONIN I SERPL DL<=0.01 NG/ML-MCNC: <0.03 NG/ML
TROPONIN I SERPL DL<=0.01 NG/ML-MCNC: <0.03 NG/ML
TSH SERPL DL<=0.005 MIU/L-ACNC: 3.72 UIU/ML (ref 0.34–5.6)
URN SPEC COLLECT METH UR: ABNORMAL
UROBILINOGEN UR STRIP-ACNC: NEGATIVE EU/DL
WBC # BLD AUTO: 9.27 K/UL (ref 3.9–12.7)
WBC #/AREA URNS HPF: 2 /HPF (ref 0–5)

## 2019-12-29 PROCEDURE — 25000003 PHARM REV CODE 250: Performed by: INTERNAL MEDICINE

## 2019-12-29 PROCEDURE — 81001 URINALYSIS AUTO W/SCOPE: CPT

## 2019-12-29 PROCEDURE — 85610 PROTHROMBIN TIME: CPT

## 2019-12-29 PROCEDURE — G0378 HOSPITAL OBSERVATION PER HR: HCPCS

## 2019-12-29 PROCEDURE — 25000003 PHARM REV CODE 250: Performed by: EMERGENCY MEDICINE

## 2019-12-29 PROCEDURE — 51702 INSERT TEMP BLADDER CATH: CPT

## 2019-12-29 PROCEDURE — 96374 THER/PROPH/DIAG INJ IV PUSH: CPT

## 2019-12-29 PROCEDURE — 96375 TX/PRO/DX INJ NEW DRUG ADDON: CPT

## 2019-12-29 PROCEDURE — 93005 ELECTROCARDIOGRAM TRACING: CPT

## 2019-12-29 PROCEDURE — 99900035 HC TECH TIME PER 15 MIN (STAT)

## 2019-12-29 PROCEDURE — 84484 ASSAY OF TROPONIN QUANT: CPT

## 2019-12-29 PROCEDURE — 83880 ASSAY OF NATRIURETIC PEPTIDE: CPT

## 2019-12-29 PROCEDURE — 84484 ASSAY OF TROPONIN QUANT: CPT | Mod: 91

## 2019-12-29 PROCEDURE — 84443 ASSAY THYROID STIM HORMONE: CPT

## 2019-12-29 PROCEDURE — 63600175 PHARM REV CODE 636 W HCPCS: Performed by: EMERGENCY MEDICINE

## 2019-12-29 PROCEDURE — 63600175 PHARM REV CODE 636 W HCPCS: Performed by: NURSE PRACTITIONER

## 2019-12-29 PROCEDURE — 80053 COMPREHEN METABOLIC PANEL: CPT

## 2019-12-29 PROCEDURE — 99285 EMERGENCY DEPT VISIT HI MDM: CPT | Mod: 25

## 2019-12-29 PROCEDURE — 85025 COMPLETE CBC W/AUTO DIFF WBC: CPT

## 2019-12-29 RX ORDER — METOPROLOL TARTRATE 50 MG/1
50 TABLET ORAL
Status: COMPLETED | OUTPATIENT
Start: 2019-12-29 | End: 2019-12-29

## 2019-12-29 RX ORDER — FUROSEMIDE 10 MG/ML
60 INJECTION INTRAMUSCULAR; INTRAVENOUS
Status: COMPLETED | OUTPATIENT
Start: 2019-12-29 | End: 2019-12-29

## 2019-12-29 RX ORDER — SODIUM CHLORIDE 0.9 % (FLUSH) 0.9 %
10 SYRINGE (ML) INJECTION
Status: DISCONTINUED | OUTPATIENT
Start: 2019-12-29 | End: 2020-01-05 | Stop reason: HOSPADM

## 2019-12-29 RX ORDER — ONDANSETRON 2 MG/ML
4 INJECTION INTRAMUSCULAR; INTRAVENOUS EVERY 8 HOURS PRN
Status: DISCONTINUED | OUTPATIENT
Start: 2019-12-29 | End: 2020-01-05 | Stop reason: HOSPADM

## 2019-12-29 RX ORDER — DILTIAZEM HYDROCHLORIDE 5 MG/ML
5 INJECTION INTRAVENOUS
Status: COMPLETED | OUTPATIENT
Start: 2019-12-29 | End: 2019-12-29

## 2019-12-29 RX ORDER — METOPROLOL TARTRATE 50 MG/1
50 TABLET ORAL 2 TIMES DAILY
Status: DISCONTINUED | OUTPATIENT
Start: 2019-12-29 | End: 2020-01-01

## 2019-12-29 RX ORDER — ACETAMINOPHEN 325 MG/1
650 TABLET ORAL EVERY 8 HOURS PRN
Status: DISCONTINUED | OUTPATIENT
Start: 2019-12-29 | End: 2020-01-05 | Stop reason: HOSPADM

## 2019-12-29 RX ORDER — LABETALOL HYDROCHLORIDE 5 MG/ML
5 INJECTION, SOLUTION INTRAVENOUS
Status: COMPLETED | OUTPATIENT
Start: 2019-12-29 | End: 2019-12-29

## 2019-12-29 RX ORDER — ONDANSETRON 2 MG/ML
4 INJECTION INTRAMUSCULAR; INTRAVENOUS
Status: COMPLETED | OUTPATIENT
Start: 2019-12-29 | End: 2019-12-29

## 2019-12-29 RX ORDER — TRAMADOL HYDROCHLORIDE 50 MG/1
50 TABLET ORAL EVERY 6 HOURS PRN
Status: DISCONTINUED | OUTPATIENT
Start: 2019-12-29 | End: 2020-01-05 | Stop reason: HOSPADM

## 2019-12-29 RX ORDER — BISACODYL 10 MG
10 SUPPOSITORY, RECTAL RECTAL DAILY PRN
Status: DISCONTINUED | OUTPATIENT
Start: 2019-12-29 | End: 2020-01-05 | Stop reason: HOSPADM

## 2019-12-29 RX ORDER — FUROSEMIDE 10 MG/ML
40 INJECTION INTRAMUSCULAR; INTRAVENOUS
Status: DISCONTINUED | OUTPATIENT
Start: 2019-12-29 | End: 2019-12-29

## 2019-12-29 RX ORDER — ONDANSETRON 4 MG/1
4 TABLET, ORALLY DISINTEGRATING ORAL
Status: DISCONTINUED | OUTPATIENT
Start: 2019-12-29 | End: 2019-12-30

## 2019-12-29 RX ORDER — PANTOPRAZOLE SODIUM 40 MG/1
40 TABLET, DELAYED RELEASE ORAL DAILY
Status: DISCONTINUED | OUTPATIENT
Start: 2019-12-30 | End: 2020-01-05 | Stop reason: HOSPADM

## 2019-12-29 RX ADMIN — ONDANSETRON 4 MG: 2 INJECTION INTRAMUSCULAR; INTRAVENOUS at 07:12

## 2019-12-29 RX ADMIN — FUROSEMIDE 60 MG: 10 INJECTION, SOLUTION INTRAMUSCULAR; INTRAVENOUS at 06:12

## 2019-12-29 RX ADMIN — METOPROLOL TARTRATE 50 MG: 50 TABLET ORAL at 07:12

## 2019-12-29 RX ADMIN — CEFTRIAXONE 1 G: 1 INJECTION, SOLUTION INTRAVENOUS at 11:12

## 2019-12-29 RX ADMIN — SODIUM CHLORIDE, SODIUM LACTATE, POTASSIUM CHLORIDE, AND CALCIUM CHLORIDE 1000 ML: .6; .31; .03; .02 INJECTION, SOLUTION INTRAVENOUS at 06:12

## 2019-12-29 RX ADMIN — LABETALOL HYDROCHLORIDE 5 MG: 5 INJECTION INTRAVENOUS at 07:12

## 2019-12-29 RX ADMIN — DILTIAZEM HYDROCHLORIDE 5 MG: 5 INJECTION INTRAVENOUS at 09:12

## 2019-12-30 ENCOUNTER — CLINICAL SUPPORT (OUTPATIENT)
Dept: CARDIOLOGY | Facility: HOSPITAL | Age: 84
DRG: 308 | End: 2019-12-30
Attending: FAMILY MEDICINE
Payer: MEDICARE

## 2019-12-30 VITALS — HEIGHT: 65 IN | BODY MASS INDEX: 19.83 KG/M2 | WEIGHT: 119 LBS

## 2019-12-30 PROBLEM — I48.91 A-FIB: Status: ACTIVE | Noted: 2019-12-30

## 2019-12-30 PROBLEM — I50.9 ACUTE ON CHRONIC CONGESTIVE HEART FAILURE: Status: ACTIVE | Noted: 2019-12-30

## 2019-12-30 LAB
ALBUMIN SERPL BCP-MCNC: 3.3 G/DL (ref 3.5–5.2)
ALP SERPL-CCNC: 39 U/L (ref 55–135)
ALT SERPL W/O P-5'-P-CCNC: 26 U/L (ref 10–44)
ANION GAP SERPL CALC-SCNC: 11 MMOL/L (ref 8–16)
AORTIC ROOT ANNULUS: 3.3 CM
AORTIC VALVE CUSP SEPERATION: 1.9 CM
AST SERPL-CCNC: 32 U/L (ref 10–40)
AV INDEX (PROSTH): 0.5
AV MEAN GRADIENT: 2 MMHG
AV PEAK GRADIENT: 4 MMHG
AV VALVE AREA: 1.62 CM2
AV VELOCITY RATIO: 62.46
BASOPHILS # BLD AUTO: 0.04 K/UL (ref 0–0.2)
BASOPHILS NFR BLD: 0.6 % (ref 0–1.9)
BILIRUB SERPL-MCNC: 1.9 MG/DL (ref 0.1–1)
BSA FOR ECHO PROCEDURE: 1.57 M2
BUN SERPL-MCNC: 17 MG/DL (ref 8–23)
CALCIUM SERPL-MCNC: 9 MG/DL (ref 8.7–10.5)
CHLORIDE SERPL-SCNC: 98 MMOL/L (ref 95–110)
CO2 SERPL-SCNC: 31 MMOL/L (ref 23–29)
CREAT SERPL-MCNC: 0.6 MG/DL (ref 0.5–1.4)
CV ECHO LV RWT: 0.38 CM
DIFFERENTIAL METHOD: ABNORMAL
DOP CALC AO PEAK VEL: 0.99 M/S
DOP CALC AO VTI: 21.73 CM
DOP CALC LVOT AREA: 3.2 CM2
DOP CALC LVOT DIAMETER: 2.03 CM
DOP CALC LVOT PEAK VEL: 61.84 M/S
DOP CALC LVOT STROKE VOLUME: 35.1 CM3
DOP CALCLVOT PEAK VEL VTI: 10.85 CM
E WAVE DECELERATION TIME: 188.54 MSEC
E/E' RATIO: 8.18 M/S
ECHO LV POSTERIOR WALL: 0.78 CM (ref 0.6–1.1)
EOSINOPHIL # BLD AUTO: 0.1 K/UL (ref 0–0.5)
EOSINOPHIL NFR BLD: 1.3 % (ref 0–8)
ERYTHROCYTE [DISTWIDTH] IN BLOOD BY AUTOMATED COUNT: 14.4 % (ref 11.5–14.5)
EST. GFR  (AFRICAN AMERICAN): >60 ML/MIN/1.73 M^2
EST. GFR  (NON AFRICAN AMERICAN): >60 ML/MIN/1.73 M^2
ESTIMATED AVG GLUCOSE: 148 MG/DL (ref 68–131)
FRACTIONAL SHORTENING: 18 % (ref 28–44)
GLUCOSE SERPL-MCNC: 140 MG/DL (ref 70–110)
GLUCOSE SERPL-MCNC: 162 MG/DL (ref 70–110)
GLUCOSE SERPL-MCNC: 163 MG/DL (ref 70–110)
GLUCOSE SERPL-MCNC: 178 MG/DL (ref 70–110)
GLUCOSE SERPL-MCNC: 192 MG/DL (ref 70–110)
HBA1C MFR BLD HPLC: 6.8 % (ref 4.5–6.2)
HCT VFR BLD AUTO: 45.5 % (ref 37–48.5)
HGB BLD-MCNC: 14.5 G/DL (ref 12–16)
IMM GRANULOCYTES # BLD AUTO: 0.03 K/UL (ref 0–0.04)
IMM GRANULOCYTES NFR BLD AUTO: 0.4 % (ref 0–0.5)
INTERVENTRICULAR SEPTUM: 1.14 CM (ref 0.6–1.1)
IVRT: 85.64 MSEC
LEFT ATRIUM SIZE: 4.04 CM
LEFT INTERNAL DIMENSION IN SYSTOLE: 3.36 CM (ref 2.1–4)
LEFT VENTRICLE DIASTOLIC VOLUME INDEX: 48.35 ML/M2
LEFT VENTRICLE DIASTOLIC VOLUME: 76.7 ML
LEFT VENTRICLE MASS INDEX: 79 G/M2
LEFT VENTRICLE SYSTOLIC VOLUME INDEX: 23 ML/M2
LEFT VENTRICLE SYSTOLIC VOLUME: 36.5 ML
LEFT VENTRICULAR INTERNAL DIMENSION IN DIASTOLE: 4.11 CM (ref 3.5–6)
LEFT VENTRICULAR MASS: 125.26 G
LV LATERAL E/E' RATIO: 9 M/S
LV SEPTAL E/E' RATIO: 7.5 M/S
LYMPHOCYTES # BLD AUTO: 2 K/UL (ref 1–4.8)
LYMPHOCYTES NFR BLD: 29 % (ref 18–48)
MCH RBC QN AUTO: 28.7 PG (ref 27–31)
MCHC RBC AUTO-ENTMCNC: 31.9 G/DL (ref 32–36)
MCV RBC AUTO: 90 FL (ref 82–98)
MONOCYTES # BLD AUTO: 0.7 K/UL (ref 0.3–1)
MONOCYTES NFR BLD: 9.7 % (ref 4–15)
MV PEAK E VEL: 0.9 M/S
NEUTROPHILS # BLD AUTO: 4 K/UL (ref 1.8–7.7)
NEUTROPHILS NFR BLD: 59 % (ref 38–73)
NRBC BLD-RTO: 0 /100 WBC
PISA TR MAX VEL: 2.7 M/S
PLATELET # BLD AUTO: 175 K/UL (ref 150–350)
PMV BLD AUTO: 10.9 FL (ref 9.2–12.9)
POTASSIUM SERPL-SCNC: 3.6 MMOL/L (ref 3.5–5.1)
PROT SERPL-MCNC: 6.3 G/DL (ref 6–8.4)
PV PEAK VELOCITY: 84.33 CM/S
RA PRESSURE: 3 MMHG
RBC # BLD AUTO: 5.06 M/UL (ref 4–5.4)
RIGHT VENTRICULAR END-DIASTOLIC DIMENSION: 223 CM
SODIUM SERPL-SCNC: 140 MMOL/L (ref 136–145)
TDI LATERAL: 0.1 M/S
TDI SEPTAL: 0.12 M/S
TDI: 0.11 M/S
TR MAX PG: 29 MMHG
TROPONIN I SERPL DL<=0.01 NG/ML-MCNC: <0.03 NG/ML
TROPONIN I SERPL DL<=0.01 NG/ML-MCNC: <0.03 NG/ML
TV REST PULMONARY ARTERY PRESSURE: 32 MMHG
WBC # BLD AUTO: 6.79 K/UL (ref 3.9–12.7)

## 2019-12-30 PROCEDURE — 27000221 HC OXYGEN, UP TO 24 HOURS

## 2019-12-30 PROCEDURE — 84484 ASSAY OF TROPONIN QUANT: CPT

## 2019-12-30 PROCEDURE — 63600175 PHARM REV CODE 636 W HCPCS: Performed by: NURSE PRACTITIONER

## 2019-12-30 PROCEDURE — 25000003 PHARM REV CODE 250: Performed by: FAMILY MEDICINE

## 2019-12-30 PROCEDURE — 93306 TTE W/DOPPLER COMPLETE: CPT

## 2019-12-30 PROCEDURE — 80053 COMPREHEN METABOLIC PANEL: CPT

## 2019-12-30 PROCEDURE — 84484 ASSAY OF TROPONIN QUANT: CPT | Mod: 91

## 2019-12-30 PROCEDURE — 21000000 HC CCU ICU ROOM CHARGE

## 2019-12-30 PROCEDURE — 85025 COMPLETE CBC W/AUTO DIFF WBC: CPT

## 2019-12-30 PROCEDURE — 87040 BLOOD CULTURE FOR BACTERIA: CPT

## 2019-12-30 PROCEDURE — 63600175 PHARM REV CODE 636 W HCPCS: Performed by: FAMILY MEDICINE

## 2019-12-30 PROCEDURE — 99900035 HC TECH TIME PER 15 MIN (STAT)

## 2019-12-30 PROCEDURE — 83036 HEMOGLOBIN GLYCOSYLATED A1C: CPT

## 2019-12-30 PROCEDURE — 63600175 PHARM REV CODE 636 W HCPCS: Performed by: SPECIALIST

## 2019-12-30 PROCEDURE — 94761 N-INVAS EAR/PLS OXIMETRY MLT: CPT

## 2019-12-30 PROCEDURE — 21400001 HC TELEMETRY ROOM

## 2019-12-30 PROCEDURE — 93005 ELECTROCARDIOGRAM TRACING: CPT

## 2019-12-30 PROCEDURE — 36415 COLL VENOUS BLD VENIPUNCTURE: CPT

## 2019-12-30 PROCEDURE — 25000003 PHARM REV CODE 250: Performed by: NURSE PRACTITIONER

## 2019-12-30 RX ORDER — DILTIAZEM HCL 1 MG/ML
2.5 INJECTION, SOLUTION INTRAVENOUS CONTINUOUS
Status: DISCONTINUED | OUTPATIENT
Start: 2019-12-30 | End: 2020-01-05 | Stop reason: HOSPADM

## 2019-12-30 RX ORDER — DILTIAZEM HCL 1 MG/ML
5 INJECTION, SOLUTION INTRAVENOUS CONTINUOUS
Status: DISCONTINUED | OUTPATIENT
Start: 2019-12-30 | End: 2019-12-30

## 2019-12-30 RX ORDER — GLUCAGON 1 MG
1 KIT INJECTION
Status: DISCONTINUED | OUTPATIENT
Start: 2019-12-30 | End: 2020-01-05 | Stop reason: HOSPADM

## 2019-12-30 RX ORDER — FUROSEMIDE 10 MG/ML
20 INJECTION INTRAMUSCULAR; INTRAVENOUS DAILY
Status: DISCONTINUED | OUTPATIENT
Start: 2019-12-30 | End: 2019-12-30

## 2019-12-30 RX ORDER — DILTIAZEM HYDROCHLORIDE 5 MG/ML
0.25 INJECTION INTRAVENOUS ONCE
Status: COMPLETED | OUTPATIENT
Start: 2019-12-30 | End: 2019-12-30

## 2019-12-30 RX ORDER — METOPROLOL TARTRATE 1 MG/ML
5 INJECTION, SOLUTION INTRAVENOUS ONCE
Status: DISCONTINUED | OUTPATIENT
Start: 2019-12-30 | End: 2019-12-30

## 2019-12-30 RX ORDER — IBUPROFEN 200 MG
16 TABLET ORAL
Status: DISCONTINUED | OUTPATIENT
Start: 2019-12-30 | End: 2020-01-05 | Stop reason: HOSPADM

## 2019-12-30 RX ORDER — INSULIN ASPART 100 [IU]/ML
0-5 INJECTION, SOLUTION INTRAVENOUS; SUBCUTANEOUS
Status: DISCONTINUED | OUTPATIENT
Start: 2019-12-30 | End: 2020-01-05 | Stop reason: HOSPADM

## 2019-12-30 RX ORDER — FUROSEMIDE 10 MG/ML
20 INJECTION INTRAMUSCULAR; INTRAVENOUS ONCE
Status: DISCONTINUED | OUTPATIENT
Start: 2019-12-30 | End: 2019-12-30

## 2019-12-30 RX ORDER — FUROSEMIDE 10 MG/ML
40 INJECTION INTRAMUSCULAR; INTRAVENOUS DAILY
Status: DISCONTINUED | OUTPATIENT
Start: 2019-12-31 | End: 2020-01-02

## 2019-12-30 RX ORDER — IBUPROFEN 200 MG
24 TABLET ORAL
Status: DISCONTINUED | OUTPATIENT
Start: 2019-12-30 | End: 2020-01-05 | Stop reason: HOSPADM

## 2019-12-30 RX ORDER — FUROSEMIDE 10 MG/ML
40 INJECTION INTRAMUSCULAR; INTRAVENOUS ONCE
Status: COMPLETED | OUTPATIENT
Start: 2019-12-30 | End: 2019-12-30

## 2019-12-30 RX ORDER — FUROSEMIDE 10 MG/ML
20 INJECTION INTRAMUSCULAR; INTRAVENOUS
Status: DISCONTINUED | OUTPATIENT
Start: 2019-12-30 | End: 2019-12-30

## 2019-12-30 RX ORDER — DILTIAZEM HCL/D5W 125 MG/125
5 PLASTIC BAG, INJECTION (ML) INTRAVENOUS CONTINUOUS
Status: DISCONTINUED | OUTPATIENT
Start: 2019-12-30 | End: 2019-12-30

## 2019-12-30 RX ADMIN — DILTIAZEM HYDROCHLORIDE 13.5 MG: 5 INJECTION INTRAVENOUS at 09:12

## 2019-12-30 RX ADMIN — RIVAROXABAN 15 MG: 15 TABLET, FILM COATED ORAL at 05:12

## 2019-12-30 RX ADMIN — METOPROLOL TARTRATE 50 MG: 50 TABLET, FILM COATED ORAL at 08:12

## 2019-12-30 RX ADMIN — FUROSEMIDE 20 MG: 10 INJECTION, SOLUTION INTRAMUSCULAR; INTRAVENOUS at 08:12

## 2019-12-30 RX ADMIN — DILTIAZEM HYDROCHLORIDE 5 MG/HR: 5 INJECTION INTRAVENOUS at 09:12

## 2019-12-30 RX ADMIN — METOPROLOL TARTRATE 50 MG: 50 TABLET, FILM COATED ORAL at 10:12

## 2019-12-30 RX ADMIN — DILTIAZEM HYDROCHLORIDE 2.5 MG/HR: 5 INJECTION INTRAVENOUS at 12:12

## 2019-12-30 RX ADMIN — PANTOPRAZOLE SODIUM 40 MG: 40 TABLET, DELAYED RELEASE ORAL at 08:12

## 2019-12-30 RX ADMIN — AZITHROMYCIN MONOHYDRATE 500 MG: 500 INJECTION, POWDER, LYOPHILIZED, FOR SOLUTION INTRAVENOUS at 05:12

## 2019-12-30 RX ADMIN — FUROSEMIDE 40 MG: 10 INJECTION, SOLUTION INTRAMUSCULAR; INTRAVENOUS at 10:12

## 2019-12-30 RX ADMIN — CEFTRIAXONE 1 G: 1 INJECTION, SOLUTION INTRAVENOUS at 11:12

## 2019-12-30 NOTE — ED PROVIDER NOTES
Encounter Date: 12/29/2019       History     Chief Complaint   Patient presents with    Shortness of Breath     X FEW DAYS    Cough    Palpitations     87-year-old female past medical history significant of AFib, diabetes, hypertension hypothyroidism presents with shortness of breath and cough.  Patient reports feeling ill for the past few days without any resolution.  She describes shortness of breath with cough that has not been resolved in spite of over-the-counter medications.  Patient denies any recent fevers, chills, sweats, chest pain associated with it.  She is otherwise stable and has no other complaints.        Review of patient's allergies indicates:   Allergen Reactions    Cortisone      abd pain    Nitrofurantoin monohyd/m-cryst      leg cramps     Past Medical History:   Diagnosis Date    Atrial fibrillation, controlled     Bullous pemphigoid     Colon cancer     Diabetes mellitus, type 2     Eczema     Hypertension     Hypothyroidism      Past Surgical History:   Procedure Laterality Date    APPENDECTOMY      BREAST SURGERY      lt breast bx    CHOLECYSTECTOMY      COLON SURGERY      EYE SURGERY       Family History   Problem Relation Age of Onset    Diabetes Mother     Heart disease Mother     Heart disease Father     Diabetes Father      Social History     Tobacco Use    Smoking status: Never Smoker    Smokeless tobacco: Never Used   Substance Use Topics    Alcohol use: No    Drug use: No     Review of Systems   Constitutional: Negative for fever.   HENT: Negative for sore throat.    Respiratory: Positive for cough and shortness of breath.    Cardiovascular: Negative for chest pain.   Gastrointestinal: Negative for nausea.   Genitourinary: Negative for dysuria.   Musculoskeletal: Negative for back pain.   Skin: Negative for rash.   Neurological: Negative for weakness.   Hematological: Does not bruise/bleed easily.   All other systems reviewed and are negative.      Physical  Exam     Initial Vitals [12/29/19 1645]   BP Pulse Resp Temp SpO2   122/70 (!) 133 (!) 22 98 °F (36.7 °C) 95 %      MAP       --         Physical Exam    Nursing note and vitals reviewed.  Constitutional: She appears well-developed and well-nourished. No distress.   HENT:   Head: Normocephalic and atraumatic.   Mouth/Throat: Oropharynx is clear and moist.   Eyes: Conjunctivae and EOM are normal. Pupils are equal, round, and reactive to light.   Neck: Normal range of motion. No tracheal deviation present. No JVD present.   Cardiovascular: Normal heart sounds and intact distal pulses. An irregularly irregular rhythm present.  Tachycardia present.  Exam reveals no gallop and no friction rub.    No murmur heard.  Pulmonary/Chest: Breath sounds normal. She is in respiratory distress. She has no wheezes. She exhibits no tenderness.   Abdominal: Soft. Bowel sounds are normal. She exhibits no distension. There is no tenderness. There is no rebound and no guarding.   Musculoskeletal: Normal range of motion. She exhibits no edema or tenderness.   Neurological: She is alert and oriented to person, place, and time. She has normal strength. No cranial nerve deficit or sensory deficit.   Skin: Skin is warm and dry. Capillary refill takes less than 2 seconds. No erythema.   Psychiatric: She has a normal mood and affect.         ED Course   Procedures  Labs Reviewed   CBC W/ AUTO DIFFERENTIAL - Abnormal; Notable for the following components:       Result Value    Mean Corpuscular Hemoglobin Conc 31.6 (*)     All other components within normal limits   COMPREHENSIVE METABOLIC PANEL - Abnormal; Notable for the following components:    Glucose 180 (*)     Total Bilirubin 1.8 (*)     Alkaline Phosphatase 45 (*)     All other components within normal limits   B-TYPE NATRIURETIC PEPTIDE - Abnormal; Notable for the following components:    BNP 1,185 (*)     All other components within normal limits   PROTIME-INR - Abnormal; Notable for  the following components:    PT 20.8 (*)     All other components within normal limits   URINALYSIS - Abnormal; Notable for the following components:    Specific Gravity, UA >=1.030 (*)     Protein, UA 2+ (*)     Occult Blood UA Trace (*)     All other components within normal limits   TROPONIN I   URINALYSIS MICROSCOPIC   TSH   TROPONIN I        ECG Results          EKG 12-lead (In process)  Result time 12/29/19 17:28:06    In process by Interface, Lab In [facility] (12/29/19 17:28:06)                 Narrative:    Test Reason : R07.9,    Vent. Rate : 200 BPM     Atrial Rate : 300 BPM     P-R Int : 000 ms          QRS Dur : 044 ms      QT Int : 142 ms       P-R-T Axes : 000 086 265 degrees     QTc Int : 259 ms    Undetermined rhythm  Low voltage QRS  Septal infarct (cited on or before 29-DEC-2019)  Possible Lateral infarct (cited on or before 29-DEC-2019)  Marked ST abnormality, possible anterior subendocardial injury  Abnormal ECG  When compared with ECG of 29-DEC-2019 16:44,  Significant changes have occurred    Referred By: [email or web address]   SELF           Confirmed By:                              EKG 12-lead (In process)  Result time 12/29/19 17:28:03    In process by Interface, Lab In [facility] (12/29/19 17:28:03)                 Narrative:    Test Reason : R06.02,    Vent. Rate : 210 BPM     Atrial Rate : 105 BPM     P-R Int : 000 ms          QRS Dur : 132 ms      QT Int : 184 ms       P-R-T Axes : 000 -56 219 degrees     QTc Int : 344 ms    Undetermined rhythm  Left axis deviation  Nonspecific intraventricular block  Cannot rule out Anteroseptal infarct ,age undetermined  Abnormal ECG  No previous ECGs available    Referred By: [email or web address]   SELF           Confirmed By:                             Imaging Results          US Lower Extremity Veins Right (Final result)  Result time 12/29/19 23:07:29    Final result by Dora Avalos MD (12/29/19 23:07:29)                 Narrative:    Exam: UNILATERAL RIGHT LOWER  EXTREMITY VENOUS DUPLEX DOPPLER    Clinical data: Swelling.  Shortness of breath.    Technique: Real time ultrasound and color Doppler imaging of the veins of the  right lower extremity were performed using grayscale, color flow and duplex  Doppler. Vessels imaged: common femoral vein, superficial femoral vein,  popliteal vein.    Prior studies: No prior studies submitted.    Findings: The right lower extremity veins demonstrate no evidence of  intraluminal echogenicity. There is normal compressibility, spontaneous blood  flow, augmentation and respiratory phasicity. No dilated veins or wall  thickening seen. The superficial veins visualized and soft tissues are  unremarkable.    Negative for DVT.    IMPRESSION: No evidence of deep vein thrombosis in right lower extremity.    Recommendation:  Follow up as clinically indicated.      Electronically Signed by RANJANA PEREZ MD at 12/30/2019 12:48:09 AM                             X-Ray Chest AP Portable (Final result)  Result time 12/29/19 17:13:22    Final result by Jane Carter MD (12/29/19 17:13:22)                 Impression:      Tiny pleural effusions with left lower lobe airspace disease      Electronically signed by: Jnae Carter MD  Date:    12/29/2019  Time:    17:13             Narrative:    EXAMINATION:  XR CHEST AP PORTABLE    CLINICAL HISTORY:  SOB;    FINDINGS:  Portable chest at 17:02 hours is compared to 03/28/2018 shows normal cardiomediastinal silhouette. There are tiny pleural effusions.    There is left lower lobe airspace disease.  The remainder of the lungs are clear.  Pulmonary vasculature is normal. No acute osseous abnormality.                                 Medical Decision Making:   Initial Assessment:   [age] year female initial assessment in moderate distress secondary to shortness of breath and increased heart rate.  Patient is alert orient x3, neurologically neurovascular intact no focal deficits.  Patient is  nontoxic appearing with stable vitals however heart rate is elevated and patient is in AFib with RVR.  Differential Diagnosis:   Arrhythmia, AFib, pneumonia, sepsis, CHF exacerbation  Independently Interpreted Test(s):   I have ordered and independently interpreted X-rays - see prior notes.  I have ordered and independently interpreted EKG Reading(s) - see prior notes  Clinical Tests:   Lab Tests: Ordered and Reviewed  The following lab test(s) were unremarkable: CBC, CMP, Troponin, BNP and Urinalysis  Radiological Study: Ordered and Reviewed  Medical Tests: Ordered and Reviewed  ED Management:  Patient has been reassessed noted to have no acute changes in her condition.  Labs show elevated BNP and patient was given IV Lasix.  Patient was also placed on BiPAP secondary to seemingly CHF exacerbation.  Labs otherwise unremarkable patient has been consulted to the hospitalist group for admission.  Patient has remained stable while in the ED and Ms. Alarcon is aware of the plan and in agreement.    Laboratory results and radiology imaging results reviewed.  Based on the patient's history, physical, and workup here in the emergency department I believe the patient requires admission for a diagnosis of CHF exacerbation.  I discussed the patient's case with the on-call hospitalist who has agreed to evaluate the patient for admission.  In addition, I discussed the results with the patient and they have verbalized understanding of the results, plan, and need for admission.    ________________________  MOSHE Caputo MD   Emergency Medicine                                   Clinical Impression:       ICD-10-CM ICD-9-CM   1. Acute on chronic congestive heart failure, unspecified heart failure type I50.9 428.0   2. SOB (shortness of breath) R06.02 786.05   3. Chest pain R07.9 786.50                             Ronaldo Caupto MD  12/30/19 0110

## 2019-12-30 NOTE — H&P
[facility] History & Physical Examination   Patient Name: Jennifer Alarcon  MRN: [id number]  Patient Class: OP- Observation   Admission Date: 12/29/2019  4:50 PM  Length of Stay: 0  Attending Physician:   Primary Care Provider: Gustavo Adams MD  Face-to-Face encounter date: 12/29/2019  Code Status:  Full cold  MPOA:  Chief Complaint: Shortness of Breath (X FEW DAYS); Cough; and Palpitations        Patient information was obtained from patient, past medical records and ER records.   HISTORY OF PRESENT ILLNESS:   Jennifer Alarcon is a 87 y.o. old  female who  has a past medical history of Atrial fibrillation, controlled, Bullous pemphigoid, Colon cancer, Diabetes mellitus, type 2, Eczema, Hypertension, and Hypothyroidism.. The patient presented to [facility] on 12/29/2019 with a primary complaint of Shortness of Breath (X FEW DAYS); Cough; and Palpitations  .   87-year-old  female presents emergency room with shortness of breath and fatigue.      The patient has a past medical history of atrial fibrillation, hypertension type 2 diabetes hyperlipidemia hypothyroidism and remote history of colon cancer.      The patient states for the past 2-3 weeks she has been having worsening shortness of breath and dyspnea with minimal exertion.  She denies associated chest pain, hematemesis hemoptysis lightheadedness dizziness or syncope.      The patient does endorse a nonproductive cough.  She also complaints of right lower extremity swelling and edema.     She describes her symptoms as moderate in severity with no exacerbating or alleviating factors  REVIEW OF SYSTEMS:   10 Point Review of System was performed and was found to be negative except for that mentioned already in the HPI and     Review of Systems (Negative unless checked off)  ROS   Review of Systems - History obtained from chart review and the patient  General ROS: positive for  - fatigue and  malaise  Psychological ROS: negative  ENT ROS: positive for - headaches  Hematological and Lymphatic ROS: negative for - bleeding problems, bruising, night sweats or swollen lymph nodes  Respiratory ROS: positive for - cough, shortness of breath and sputum changes  Cardiovascular ROS: positive for - dyspnea on exertion, orthopnea, palpitations and shortness of breath  Gastrointestinal ROS: no abdominal pain, change in bowel habits, or black or bloody stools  Musculoskeletal ROS: negative  Neurological ROS: no TIA or stroke symptoms         PAST MEDICAL HISTORY:     Past Medical History:   Diagnosis Date    Atrial fibrillation, controlled     Bullous pemphigoid     Colon cancer     Diabetes mellitus, type 2     Eczema     Hypertension     Hypothyroidism        PAST SURGICAL HISTORY:     Past Surgical History:   Procedure Laterality Date    APPENDECTOMY      BREAST SURGERY      lt breast bx    CHOLECYSTECTOMY      COLON SURGERY      EYE SURGERY         ALLERGIES:   Cortisone and Nitrofurantoin monohyd/m-cryst    FAMILY HISTORY:     Family History   Problem Relation Age of Onset    Diabetes Mother     Heart disease Mother     Heart disease Father     Diabetes Father        SOCIAL HISTORY:     Social History     Tobacco Use    Smoking status: Never Smoker    Smokeless tobacco: Never Used   Substance Use Topics    Alcohol use: No        Social History     Substance and Sexual Activity   Sexual Activity Never        HOME MEDICATIONS:     Prior to Admission medications    Medication Sig Start Date End Date Taking? Authorizing Provider   metoprolol tartrate (LOPRESSOR) 50 MG tablet Take 1 tablet by mouth 2 (two) times daily.  5/1/18  Yes Historical Provider, MD   rivaroxaban (XARELTO) 20 mg Tab Take 15 mg by mouth daily with dinner or evening meal.    Yes Historical Provider, MD   mupirocin (BACTROBAN) 2 % ointment BLANCA A SMALL AMOUNT AA QHS 6/18/18   Historical Provider, MD   triamcinolone acetonide 0.1%  (KENALOG) 0.1 % paste triamcinolone 0.1 % topical ointment and dimethicone 5 % topical cream    Historical Provider, MD         PHYSICAL EXAM:   /73   Pulse (!) 127   Temp 98 °F (36.7 °C) (Oral)   Resp 16   SpO2 97%   Vitals Reviewed  General appearance: Well-developed, well-nourished female in no apparent distress.  Skin: No Rash.   Neuro: Motor and sensory exams grossly intact. Good tone. Power in all 4 extremities 5/5.   HENT: Atraumatic head. Moist mucous membranes of oral cavity.  Eyes: Normal extraocular movements.   Neck: Supple. No evidence of lymphadenopathy. No thyroidomegaly.  Lungs: Clear to auscultation bilaterally. No wheezing present.   Heart:  Irregularly irregular heart rhythm, 2/6 systolic murmur, 2+ pitting edema to right lower extremity Abdomen: Soft, non-distended, non-tender. No rebound tenderness/guarding. No masses or organomegaly. Bowel sounds are normal. Bladder is not palpable.   Extremities: No cyanosis, clubbing, or edema.  Psych/mental status: Alert and oriented. Cooperative. Responds appropriately to questions.   EMERGENCY DEPARTMENT LABS AND IMAGING:   Following labs were Reviewed   Recent Labs   Lab 12/29/19  1703   WBC 9.27   HGB 15.0   HCT 47.4      CALCIUM 9.4   ALBUMIN 3.8   PROT 7.4      K 4.5   CO2 28      BUN 23   CREATININE 0.8   ALKPHOS 45*   ALT 17   AST 25   BILITOT 1.8*         CMP   Recent Labs   Lab 12/29/19  1703      K 4.5      CO2 28   *   BUN 23   CREATININE 0.8   CALCIUM 9.4   PROT 7.4   ALBUMIN 3.8   BILITOT 1.8*   ALKPHOS 45*   AST 25   ALT 17   ANIONGAP 8   ESTGFRAFRICA >60.0   EGFRNONAA >60.0   , CBC   Recent Labs   Lab 12/29/19  1703   WBC 9.27   HGB 15.0   HCT 47.4      , INR   Lab Results   Component Value Date    INR 1.9 12/29/2019   , Lipid Panel   Lab Results   Component Value Date    CHOL 188 01/10/2019    HDL 54 01/10/2019    LDLCALC 112 (H) 01/10/2019    TRIG 111 01/10/2019   , Troponin   Recent  Labs   Lab 12/29/19  1703   TROPONINI <0.030   , A1C:   Recent Labs   Lab 07/11/19  0948   HGBA1C 6.9*    and All labs within the past 24 hours have been reviewed  Microbiology Results (last 7 days)     ** No results found for the last 168 hours. **        X-Ray Chest AP Portable   Final Result      Tiny pleural effusions with left lower lobe airspace disease         Electronically signed by: Jane Carter MD   Date:    12/29/2019   Time:    17:13      X-Ray Chest AP Portable    (Results Pending)     12 lead EKG  Atrial fibrillation with ever trigger response of 150, left axis deviation, poor R-wave progression, incomplete right bundle branch block, inferior Q-waves in 2 3 and AVF, possible biatrial enlargement QTc 259 milliseconds  ASSESSMENT & PLAN:   Jennifer Alarcon is a 87 y.o. female admitted for sob LEROY    1. Paroxsymal A-FiB with RVR  -given IV metoprolol and subsequently IV diltiazem with improved rate control  -continue are metoprolol and p.r.n. IV diltiazem  -2D echo complete  -patient is on Xarelto and admits to compliance with medication-continue home dose of Xarelto  -consult cardiologist Dr. Quick  (Dr. Rodríguez is the patient's primary cardiologist)    2.  Possible Left lower lobe Pneumonia  -IV antibiotics with Rocephin and Zithromax    3. Pleural effusions bilaterally  -IV diuresing given    4. Acute  CHF  -BNP elevated at 1185  -2D echo complete  -IV diuresing with Lasix  -consult Cardiology    5. Hypothyroidism  -check TSH level    6 Non- Insulin dependent Diabetic  -low-dose NovoLog insulin sliding scale    7.  Right lower extremity edema  -ultrasound of right lower extremity negative for DVT    DVT Prophylaxis: will be placed on Heparin/Lovenox for DVT prophylaxis and will be advised to be as mobile as possible and sit in a chair as tolerated.   ________________________________________________________________________________  Moderate risk for clinical decline secondary to atrial  fibrillation, acute heart failure and advanced stage      Discharge Planning and Disposition: No mobility needs. Ambulating well. Good social support system. Patient will be discharged in   Face-to-Face encounter date: 12/29/2019  Encounter included review of the medical records, interviewing and examining the patient face-to-face, discussion with family and other health care providers including emergency medicine physician, admission orders, interpreting lab/test results and formulating a plan of care.   Medical Decision Making during this encounter was  [_] Low Complexity  [_] Moderate Complexity  [x] High Complexity  _________________________________________________________________________________    INPATIENT LIST OF MEDICATIONS     Current Facility-Administered Medications:     acetaminophen tablet 650 mg, 650 mg, Oral, Q8H PRN, Ramona Fall NP    bisacodyl suppository 10 mg, 10 mg, Rectal, Daily PRN, Ramona Fall NP    metoprolol tartrate (LOPRESSOR) tablet 50 mg, 50 mg, Oral, BID, Ramona Fall NP    ondansetron disintegrating tablet 4 mg, 4 mg, Oral, ED 1 Time, Ronaldo Caputo MD    ondansetron injection 4 mg, 4 mg, Intravenous, Q8H PRN, Ramona Fall NP    [START ON 12/30/2019] pantoprazole EC tablet 40 mg, 40 mg, Oral, Daily, Ramona Fall NP    [START ON 12/30/2019] rivaroxaban tablet 20 mg, 20 mg, Oral, Daily with dinner, Ramona Fall NP    sodium chloride 0.9% flush 10 mL, 10 mL, Intravenous, PRN, Ramona Fall NP    traMADol tablet 50 mg, 50 mg, Oral, Q6H PRN, Ramona Fall NP    Current Outpatient Medications:     metoprolol tartrate (LOPRESSOR) 50 MG tablet, Take 1 tablet by mouth 2 (two) times daily. , Disp: , Rfl:     rivaroxaban (XARELTO) 20 mg Tab, Take 15 mg by mouth daily with dinner or evening meal. , Disp: , Rfl:     mupirocin (BACTROBAN) 2 % ointment, BLANCA A SMALL AMOUNT AA QHS, Disp: , Rfl: 3    triamcinolone acetonide 0.1% (KENALOG) 0.1 % paste, triamcinolone 0.1 %  topical ointment and dimethicone 5 % topical cream, Disp: , Rfl:       Scheduled Meds:   metoprolol tartrate  50 mg Oral BID    ondansetron  4 mg Oral ED 1 Time    [START ON 12/30/2019] pantoprazole  40 mg Oral Daily    [START ON 12/30/2019] rivaroxaban  20 mg Oral Daily with dinner     Continuous Infusions:  PRN Meds:.acetaminophen, bisacodyl, ondansetron, sodium chloride 0.9%, traMADol      Ramona Fall  [facility] Hospitalist NP  12/29/2019

## 2019-12-30 NOTE — NURSING
PT transferred to [id number]. Report given to Isaias STACY. PT comfortable. Stable and voices no complaints. All belongings transferred with PT

## 2019-12-30 NOTE — NURSING
PT started on Cardizem drip at 5MG.  PT blood pressure had drop after bolus. MD aware and will monitor.

## 2019-12-30 NOTE — PROGRESS NOTES
87 yr old female alert  Has friend at bedside.        12/30/19 1359        Pressure Injury 12/30/19 1359 Left lower Buttocks #1 Stage 2   Date First Assessed/Time First Assessed: 12/30/19 1359   Pressure Injury Present on Admission: yes  Side: Left  Orientation: lower  Location: Buttocks  Wound/PI Number (optional): #1  Is this injury device related?: No  Staging: Stage 2   Wound Image    Staging Stage 2   Dressing Appearance Open to air;Dry   Drainage Amount None   Appearance   (crusty scab)   Periwound Area Intact;Redness   Wound Length (cm) 1 cm   Wound Width (cm) 0.8 cm   Wound Depth (cm)   (scab)   Wound Surface Area (cm^2) 0.8 cm^2   Dressing Island/border        12/30/19 1401        Pressure Injury 12/30/19 1401 Right Buttocks Stage 2   Date First Assessed/Time First Assessed: 12/30/19 1401   Pressure Injury Present on Admission: yes  Side: Right  Location: Buttocks  Is this injury device related?: No  Staging: Stage 2   Wound Image    Staging Stage 2   Dressing Appearance Dry   Drainage Amount None   Appearance Red   Periwound Area Redness;Excoriated   Wound Edges Irregular   Wound Length (cm) 2.5 cm   Wound Width (cm) 2 cm   Wound Depth (cm) 0.1 cm   Wound Volume (cm^3) 0.5 cm^3   Wound Surface Area (cm^2) 5 cm^2   Dressing Island/border     Recommendation:  Clean with chlorhexidine/ns. Pat dry. Apply Calmoseptine and cover with mepilex Daily.   Turn reposition q2 as pt's condition will allow.  Bilat heel pillows   Float and elevate heels of bed with pillows.  Air mattress/waffle

## 2019-12-31 LAB
ALBUMIN SERPL BCP-MCNC: 3.5 G/DL (ref 3.5–5.2)
ALP SERPL-CCNC: 41 U/L (ref 55–135)
ALT SERPL W/O P-5'-P-CCNC: 22 U/L (ref 10–44)
ANION GAP SERPL CALC-SCNC: 11 MMOL/L (ref 8–16)
AST SERPL-CCNC: 24 U/L (ref 10–40)
BASOPHILS # BLD AUTO: 0.03 K/UL (ref 0–0.2)
BASOPHILS NFR BLD: 0.4 % (ref 0–1.9)
BILIRUB SERPL-MCNC: 1.5 MG/DL (ref 0.1–1)
BUN SERPL-MCNC: 17 MG/DL (ref 8–23)
CALCIUM SERPL-MCNC: 9 MG/DL (ref 8.7–10.5)
CHLORIDE SERPL-SCNC: 96 MMOL/L (ref 95–110)
CO2 SERPL-SCNC: 33 MMOL/L (ref 23–29)
CREAT SERPL-MCNC: 0.7 MG/DL (ref 0.5–1.4)
DIFFERENTIAL METHOD: NORMAL
EOSINOPHIL # BLD AUTO: 0.1 K/UL (ref 0–0.5)
EOSINOPHIL NFR BLD: 1.9 % (ref 0–8)
ERYTHROCYTE [DISTWIDTH] IN BLOOD BY AUTOMATED COUNT: 14.4 % (ref 11.5–14.5)
EST. GFR  (AFRICAN AMERICAN): >60 ML/MIN/1.73 M^2
EST. GFR  (NON AFRICAN AMERICAN): >60 ML/MIN/1.73 M^2
GLUCOSE SERPL-MCNC: 124 MG/DL (ref 70–110)
GLUCOSE SERPL-MCNC: 135 MG/DL (ref 70–110)
GLUCOSE SERPL-MCNC: 139 MG/DL (ref 70–110)
GLUCOSE SERPL-MCNC: 150 MG/DL (ref 70–110)
GLUCOSE SERPL-MCNC: 209 MG/DL (ref 70–110)
HCT VFR BLD AUTO: 41.5 % (ref 37–48.5)
HGB BLD-MCNC: 13.3 G/DL (ref 12–16)
IMM GRANULOCYTES # BLD AUTO: 0.03 K/UL (ref 0–0.04)
IMM GRANULOCYTES NFR BLD AUTO: 0.4 % (ref 0–0.5)
LYMPHOCYTES # BLD AUTO: 1.6 K/UL (ref 1–4.8)
LYMPHOCYTES NFR BLD: 21.1 % (ref 18–48)
MCH RBC QN AUTO: 28.8 PG (ref 27–31)
MCHC RBC AUTO-ENTMCNC: 32 G/DL (ref 32–36)
MCV RBC AUTO: 90 FL (ref 82–98)
MONOCYTES # BLD AUTO: 0.8 K/UL (ref 0.3–1)
MONOCYTES NFR BLD: 11 % (ref 4–15)
NEUTROPHILS # BLD AUTO: 4.9 K/UL (ref 1.8–7.7)
NEUTROPHILS NFR BLD: 65.2 % (ref 38–73)
NRBC BLD-RTO: 0 /100 WBC
PLATELET # BLD AUTO: 178 K/UL (ref 150–350)
PMV BLD AUTO: 10.7 FL (ref 9.2–12.9)
POTASSIUM SERPL-SCNC: 3.5 MMOL/L (ref 3.5–5.1)
PROCALCITONIN SERPL IA-MCNC: <0.05 NG/ML (ref 0–0.5)
PROT SERPL-MCNC: 6.5 G/DL (ref 6–8.4)
RBC # BLD AUTO: 4.62 M/UL (ref 4–5.4)
SODIUM SERPL-SCNC: 140 MMOL/L (ref 136–145)
WBC # BLD AUTO: 7.53 K/UL (ref 3.9–12.7)

## 2019-12-31 PROCEDURE — 85025 COMPLETE CBC W/AUTO DIFF WBC: CPT

## 2019-12-31 PROCEDURE — 80053 COMPREHEN METABOLIC PANEL: CPT

## 2019-12-31 PROCEDURE — 99900035 HC TECH TIME PER 15 MIN (STAT)

## 2019-12-31 PROCEDURE — 21000000 HC CCU ICU ROOM CHARGE

## 2019-12-31 PROCEDURE — 84145 PROCALCITONIN (PCT): CPT

## 2019-12-31 PROCEDURE — 63600175 PHARM REV CODE 636 W HCPCS: Performed by: FAMILY MEDICINE

## 2019-12-31 PROCEDURE — 36415 COLL VENOUS BLD VENIPUNCTURE: CPT

## 2019-12-31 PROCEDURE — 27000221 HC OXYGEN, UP TO 24 HOURS

## 2019-12-31 PROCEDURE — 21400001 HC TELEMETRY ROOM

## 2019-12-31 PROCEDURE — 63600175 PHARM REV CODE 636 W HCPCS: Performed by: SPECIALIST

## 2019-12-31 PROCEDURE — 94761 N-INVAS EAR/PLS OXIMETRY MLT: CPT

## 2019-12-31 PROCEDURE — 25000003 PHARM REV CODE 250: Performed by: SPECIALIST

## 2019-12-31 PROCEDURE — 25000003 PHARM REV CODE 250: Performed by: FAMILY MEDICINE

## 2019-12-31 RX ORDER — METOPROLOL SUCCINATE 25 MG/1
25 TABLET, EXTENDED RELEASE ORAL ONCE
Status: COMPLETED | OUTPATIENT
Start: 2019-12-31 | End: 2019-12-31

## 2019-12-31 RX ORDER — FUROSEMIDE 10 MG/ML
20 INJECTION INTRAMUSCULAR; INTRAVENOUS ONCE
Status: COMPLETED | OUTPATIENT
Start: 2019-12-31 | End: 2019-12-31

## 2019-12-31 RX ADMIN — DILTIAZEM HYDROCHLORIDE 2.5 MG/HR: 5 INJECTION INTRAVENOUS at 06:12

## 2019-12-31 RX ADMIN — METOPROLOL TARTRATE 50 MG: 50 TABLET, FILM COATED ORAL at 09:12

## 2019-12-31 RX ADMIN — METOPROLOL TARTRATE 50 MG: 50 TABLET, FILM COATED ORAL at 08:12

## 2019-12-31 RX ADMIN — FUROSEMIDE 40 MG: 10 INJECTION, SOLUTION INTRAMUSCULAR; INTRAVENOUS at 11:12

## 2019-12-31 RX ADMIN — FUROSEMIDE 20 MG: 10 INJECTION, SOLUTION INTRAMUSCULAR; INTRAVENOUS at 04:12

## 2019-12-31 RX ADMIN — RIVAROXABAN 15 MG: 15 TABLET, FILM COATED ORAL at 04:12

## 2019-12-31 RX ADMIN — PANTOPRAZOLE SODIUM 40 MG: 40 TABLET, DELAYED RELEASE ORAL at 06:12

## 2019-12-31 RX ADMIN — METOPROLOL SUCCINATE 25 MG: 25 TABLET, FILM COATED, EXTENDED RELEASE ORAL at 09:12

## 2019-12-31 NOTE — PROGRESS NOTES
Formerly [facility] Medicine  Progress Note    Patient Name: Jennifer Alarcon  MRN: [id number]  Patient Class: IP- Inpatient   Admission Date: 12/29/2019  Length of Stay: 1 days  Attending Physician: Nely Bernabe DO  Primary Care Provider: Gustavo Adams MD      Subjective:     Principal Problem:Paroxysmal atrial fibrillation  Chief Complaint   Patient presents with    Shortness of Breath     X FEW DAYS    Cough    Palpitations       Interval History: Afib rate 70's on exam but some elevation of HR to 110's-120's earlier this morning. On Diltiazem titrating drip.  Given IV Lasix yesterday and this morning with net 2.18 L negative since admission.  Patient seen and examined.  She denies chest pain, palpitations, chest pressure, shortness of breath, cough, nausea, vomiting, fever, chills.    Review of Systems   Per interval history, all other systems reviewed and negative.     Objective:     Vital Signs (Most Recent):  Temp: 97.3 °F (36.3 °C) (12/31/19 0745)  Pulse: (!) 120 (12/31/19 0811)  Resp: 18 (12/31/19 0745)  BP: 102/63 (12/31/19 0745)  SpO2: (!) 90 % (12/31/19 0745) Vital Signs (24h Range):  Temp:  [97.3 °F (36.3 °C)-98.5 °F (36.9 °C)] 97.3 °F (36.3 °C)  Pulse:  [] 120  Resp:  [16-29] 18  SpO2:  [90 %-100 %] 90 %  BP: ()/(53-79) 102/63     Weight: 53.4 kg (117 lb 11.6 oz)  Body mass index is 19.59 kg/m².    Intake/Output Summary (Last 24 hours) at 12/31/2019 0943  Last data filed at 12/31/2019 0844  Gross per 24 hour   Intake 320 ml   Output 1800 ml   Net -1480 ml      Physical Exam   Constitutional: She is oriented to person, place, and time. She appears well-developed and well-nourished.   HENT:   Head: Atraumatic.   Eyes: Pupils are equal, round, and reactive to light. Conjunctivae and EOM are normal.   Neck: Normal range of motion. Neck supple. No JVD present. No thyromegaly present.   Cardiovascular: Normal rate. Exam reveals no gallop and no friction rub.   Murmur  heard.  IRR   Pulmonary/Chest: Effort normal and breath sounds normal. She has no wheezes. She has no rales.   Abdominal: Soft. Bowel sounds are normal. She exhibits no distension. There is no tenderness.   Musculoskeletal: Normal range of motion. She exhibits edema (1+ right).   Neurological: She is alert and oriented to person, place, and time. She has normal reflexes.   Skin: Skin is warm and dry.   Psychiatric: She has a normal mood and affect. Her behavior is normal.   Nursing note and vitals reviewed.      Significant Labs:   BMP:   Recent Labs   Lab 12/31/19  0434   *      K 3.5   CL 96   CO2 33*   BUN 17   CREATININE 0.7   CALCIUM 9.0     CBC:   Recent Labs   Lab 12/29/19  1703 12/30/19  0618 12/31/19  0434   WBC 9.27 6.79 7.53   HGB 15.0 14.5 13.3   HCT 47.4 45.5 41.5    175 178       Recent Labs   Lab 12/29/19  1703 12/29/19  2136 12/30/19  0009 12/30/19  0618   TROPONINI <0.030 <0.030 <0.030 <0.030   BNP 1,185*  --   --   --       Lab Results   Component Value Date    HGBA1C 6.8 (H) 12/30/2019     Lab Results   Component Value Date    TSH 3.720 12/29/2019       All pertinent labs within the past 24 hours have been reviewed.    Significant Imaging: no new images last 24 hours    12/29 US LE Veins right  IMPRESSION: No evidence of deep vein thrombosis in right lower extremity.    12/29 CXR  Tiny pleural effusions with left lower lobe airspace disease    12/30 ECHO  · Decreased left ventricular systolic function. The estimated ejection fraction is 40%  · Local segmental wall motion abnormalities.  · Atrial fibrillation observed.  · Mild left atrial enlargement.  · Mild right atrial enlargement.  · Mild-to-moderate mitral regurgitation.  · Mild pulmonic regurgitation.  · Normal central venous pressure (3 mm Hg).  · The estimated PA systolic pressure is 32 mm Hg  · There is a left pleural effusion.    Assessment/Plan:     Active Hospital Problems    Diagnosis  POA    *Paroxysmal atrial  fibrillation with RVR [I48.0]  Yes    A-fib [I48.91]  Yes    Acute on chronic congestive heart failure [I50.9]  Yes    SOB (shortness of breath) [R06.02]  Yes    Hypothyroidism [E03.9]  Yes     since 2008, unable to supplement due to hypersensitivity to levothyroxine producing tachycardia even at low doses      Hypertension [I10]  Yes    Diabetes mellitus, type 2 [E11.9]  Yes       Paroxysmal A-fib with RVR  - Cardiology consulted and seen by Dr. Noe  - c/w Xarelto  - continue with metoprolol  - diltiazem drip weaned as tolerated  - ECHO completed: EF 40%, MR  - diuresis for acute on chronic CHF    Possible LLL PNA  - AM CXR pending  - Procal pending  - will continue with IV Rocephin and azithromycin for now      Acute on Chronic CHF  Pleural Effusions  - BNP 1185 on admission  - ECHO with EF 40%  - Lasix 40 mg IV q day ordered and plan to transition to PO  - UOP 2.18 L since admission  - strict in/out, daily wts, low salt diet      Hypothyroidism  - TSH wnl  - not on medications  - stable    Non- Insulin dependent Diabetes  - Last A1c 6.8 on 12/30/19  - POCT glucose QACHS  - Low dose SSI      Right Lower extremity edema  - US negative for dvt  - monitoring    VTE Risk Mitigation (From admission, onward)         Ordered     rivaroxaban tablet 15 mg  With dinner      12/29/19 2053     Place STACIE hose  Until discontinued      12/29/19 2053     IP VTE LOW RISK PATIENT  Once      12/29/19 2053                Nely Bernabe DO  Department of Hospital Medicine   Formerly [facility]

## 2019-12-31 NOTE — PROGRESS NOTES
Progress Note  Cardiology    Admit Date: 12/29/2019   LOS: 1 day     Follow-up For:  Atrial fib with RVR; CHF    Scheduled Meds:   azithromycin  500 mg Intravenous Q24H    cefTRIAXone (ROCEPHIN) IVPB  1 g Intravenous Q24H    furosemide (LASIX) IV  40 mg Intravenous Daily    metoprolol tartrate  50 mg Oral BID    pantoprazole  40 mg Oral Daily    rivaroxaban  15 mg Oral Daily with dinner     Continuous Infusions:   dilTIAZem Stopped (12/31/19 0531)     PRN Meds:acetaminophen, bisacodyl, dextrose 50%, dextrose 50%, glucagon (human recombinant), glucose, glucose, insulin aspart U-100, ondansetron, sodium chloride 0.9%, traMADol    Review of patient's allergies indicates:   Allergen Reactions    Cortisone      abd pain    Nitrofurantoin monohyd/m-cryst      leg cramps       SUBJECTIVE:     Interval History: Patient has no complaint of chest pain or tightness.  Ambulated to the bathroom.    Review of Systems  Respiratory: negative for cough, hemoptysis, sputum and wheezing  Cardiovascular: negative for dyspnea, palpitations and paroxysmal nocturnal dyspnea    OBJECTIVE:     Vital Signs (Most Recent)  Temp: 98.6 °F (37 °C) (12/31/19 1135)  Pulse: 96 (12/31/19 1135)  Resp: (!) 21 (12/31/19 1135)  BP: 104/71 (12/31/19 1135)  SpO2: 99 % (12/31/19 1135)    Vital Signs Range (Last 24H):  Temp:  [97.3 °F (36.3 °C)-98.6 °F (37 °C)]   Pulse:  []   Resp:  [18-29]   BP: (101-114)/(53-79)   SpO2:  [90 %-100 %]       Physical Exam:  Neck: no carotid bruit, no JVD and supple, symmetrical, trachea midline  Lungs: clear to auscultation bilaterally, normal respiratory effort  Heart: irregularly irregular rhythm  Abdomen: soft, non-tender; bowel sounds normal; no masses,  no organomegaly  Extremities: Positive for: edema 1+ and pitting bilaterally    Recent Results (from the past 24 hour(s))   Echo Color Flow Doppler? Yes; Bubble Contrast? No    Collection Time: 12/30/19  1:27 PM   Result Value Ref Range    BSA 1.57 m2     TDI SEPTAL 0.12 m/s    LV LATERAL E/E' RATIO 9.00 m/s    LV SEPTAL E/E' RATIO 7.50 m/s    AORTIC VALVE CUSP SEPERATION 1.90 cm    TDI LATERAL 0.10 m/s    PV PEAK VELOCITY 84.33 cm/s    LVIDD 4.11 3.5 - 6.0 cm    IVS 1.14 (A) 0.6 - 1.1 cm    PW 0.78 0.6 - 1.1 cm    Ao root annulus 3.30 cm    LVIDS 3.36 2.1 - 4.0 cm    FS 18 28 - 44 %    LV mass 125.26 g    LA size 4.04 cm    RVDD 223.00 cm    Left Ventricle Relative Wall Thickness 0.38 cm    AV mean gradient 2 mmHg    AV valve area 1.62 cm2    AV Velocity Ratio 62.46     AV index (prosthetic) 0.50     Mean e' 0.11 m/s    E wave decelartion time 188.54 msec    IVRT 85.64 msec    LVOT diameter 2.03 cm    LVOT area 3.2 cm2    LVOT peak forrest 61.84 m/s    LVOT peak VTI 10.85 cm    Ao peak forrest 0.99 m/s    Ao VTI 21.73 cm    LVOT stroke volume 35.10 cm3    AV peak gradient 4 mmHg    E/E' ratio 8.18 m/s    MV Peak E Forrest 0.90 m/s    TR Max Forrest 2.70 m/s    LV Systolic Volume 36.50 mL    LV Systolic Volume Index 23.0 mL/m2    LV Diastolic Volume 76.70 mL    LV Diastolic Volume Index 48.35 mL/m2    LV Mass Index 79 g/m2    Triscuspid Valve Regurgitation Peak Gradient 29 mmHg    Right Atrial Pressure (from IVC) 3 mmHg    TV rest pulmonary artery pressure 32 mmHg   POCT glucose    Collection Time: 12/30/19  3:26 PM   Result Value Ref Range    POC Glucose 162 (H) 70 - 110   POCT glucose    Collection Time: 12/30/19  9:07 PM   Result Value Ref Range    POC Glucose 192 (H) 70 - 110   CBC auto differential    Collection Time: 12/31/19  4:34 AM   Result Value Ref Range    WBC 7.53 3.90 - 12.70 K/uL    RBC 4.62 4.00 - 5.40 M/uL    Hemoglobin 13.3 12.0 - 16.0 g/dL    Hematocrit 41.5 37.0 - 48.5 %    Mean Corpuscular Volume 90 82 - 98 fL    Mean Corpuscular Hemoglobin 28.8 27.0 - 31.0 pg    Mean Corpuscular Hemoglobin Conc 32.0 32.0 - 36.0 g/dL    RDW 14.4 11.5 - 14.5 %    Platelets 178 150 - 350 K/uL    MPV 10.7 9.2 - 12.9 fL    Immature Granulocytes 0.4 0.0 - 0.5 %    Gran # (ANC) 4.9  1.8 - 7.7 K/uL    Immature Grans (Abs) 0.03 0.00 - 0.04 K/uL    Lymph # 1.6 1.0 - 4.8 K/uL    Mono # 0.8 0.3 - 1.0 K/uL    Eos # 0.1 0.0 - 0.5 K/uL    Baso # 0.03 0.00 - 0.20 K/uL    nRBC 0 0 /100 WBC    Gran% 65.2 38.0 - 73.0 %    Lymph% 21.1 18.0 - 48.0 %    Mono% 11.0 4.0 - 15.0 %    Eosinophil% 1.9 0.0 - 8.0 %    Basophil% 0.4 0.0 - 1.9 %    Differential Method Automated    Comprehensive metabolic panel    Collection Time: 12/31/19  4:34 AM   Result Value Ref Range    Sodium 140 136 - 145 mmol/L    Potassium 3.5 3.5 - 5.1 mmol/L    Chloride 96 95 - 110 mmol/L    CO2 33 (H) 23 - 29 mmol/L    Glucose 139 (H) 70 - 110 mg/dL    BUN, Bld 17 8 - 23 mg/dL    Creatinine 0.7 0.5 - 1.4 mg/dL    Calcium 9.0 8.7 - 10.5 mg/dL    Total Protein 6.5 6.0 - 8.4 g/dL    Albumin 3.5 3.5 - 5.2 g/dL    Total Bilirubin 1.5 (H) 0.1 - 1.0 mg/dL    Alkaline Phosphatase 41 (L) 55 - 135 U/L    AST 24 10 - 40 U/L    ALT 22 10 - 44 U/L    Anion Gap 11 8 - 16 mmol/L    eGFR if African American >60.0 >60 mL/min/1.73 m^2    eGFR if non African American >60.0 >60 mL/min/1.73 m^2   POCT glucose    Collection Time: 12/31/19  5:42 AM   Result Value Ref Range    POC Glucose 135 (H) 70 - 110   Procalcitonin    Collection Time: 12/31/19 10:06 AM   Result Value Ref Range    Procalcitonin <0.05 0.00 - 0.50 ng/mL   POCT glucose    Collection Time: 12/31/19 10:53 AM   Result Value Ref Range    POC Glucose 150 (H) 70 - 110       Diagnostic Results:  Labs: Reviewed  X-Ray: Reviewed    ASSESSMENT/PLAN:     RVR for the last 90 min.  Ventricular rate was better controlled earlier.  No significant change in chest x-ray.  Lasix 20 mg IV additional dose.  May need to increase metoprolol dose to 75 mg b.i.d. if RVR persists.

## 2019-12-31 NOTE — SUBJECTIVE & OBJECTIVE
Interval History: Afib rate 70's on exam but some elevation of HR to 110's-120's earlier this morning. On Diltiazem titrating drip.  Given IV Lasix yesterday and this morning with net 2.18 L negative since admission.  Patient seen and examined.  She denies chest pain, palpitations, chest pressure, shortness of breath, cough, nausea, vomiting, fever, chills.    Review of Systems   Per interval history, all other systems reviewed and negative.     Objective:     Vital Signs (Most Recent):  Temp: 97.3 °F (36.3 °C) (12/31/19 0745)  Pulse: (!) 120 (12/31/19 0811)  Resp: 18 (12/31/19 0745)  BP: 102/63 (12/31/19 0745)  SpO2: (!) 90 % (12/31/19 0745) Vital Signs (24h Range):  Temp:  [97.3 °F (36.3 °C)-98.5 °F (36.9 °C)] 97.3 °F (36.3 °C)  Pulse:  [] 120  Resp:  [16-29] 18  SpO2:  [90 %-100 %] 90 %  BP: ()/(53-79) 102/63     Weight: 53.4 kg (117 lb 11.6 oz)  Body mass index is 19.59 kg/m².    Intake/Output Summary (Last 24 hours) at 12/31/2019 0943  Last data filed at 12/31/2019 0844  Gross per 24 hour   Intake 320 ml   Output 1800 ml   Net -1480 ml      Physical Exam   Constitutional: She is oriented to person, place, and time. She appears well-developed and well-nourished.   HENT:   Head: Atraumatic.   Eyes: Pupils are equal, round, and reactive to light. Conjunctivae and EOM are normal.   Neck: Normal range of motion. Neck supple. No JVD present. No thyromegaly present.   Cardiovascular: Normal rate. Exam reveals no gallop and no friction rub.   Murmur heard.  IRR   Pulmonary/Chest: Effort normal and breath sounds normal. She has no wheezes. She has no rales.   Abdominal: Soft. Bowel sounds are normal. She exhibits no distension. There is no tenderness.   Musculoskeletal: Normal range of motion. She exhibits edema (1+ right).   Neurological: She is alert and oriented to person, place, and time. She has normal reflexes.   Skin: Skin is warm and dry.   Psychiatric: She has a normal mood and affect. Her behavior  is normal.   Nursing note and vitals reviewed.      Significant Labs:   BMP:   Recent Labs   Lab 12/31/19  0434   *      K 3.5   CL 96   CO2 33*   BUN 17   CREATININE 0.7   CALCIUM 9.0     CBC:   Recent Labs   Lab 12/29/19  1703 12/30/19  0618 12/31/19  0434   WBC 9.27 6.79 7.53   HGB 15.0 14.5 13.3   HCT 47.4 45.5 41.5    175 178       Recent Labs   Lab 12/29/19  1703 12/29/19  2136 12/30/19  0009 12/30/19 0618   TROPONINI <0.030 <0.030 <0.030 <0.030   BNP 1,185*  --   --   --       Lab Results   Component Value Date    HGBA1C 6.8 (H) 12/30/2019     Lab Results   Component Value Date    TSH 3.720 12/29/2019       All pertinent labs within the past 24 hours have been reviewed.    Significant Imaging: no new images last 24 hours    12/29 US LE Veins right  IMPRESSION: No evidence of deep vein thrombosis in right lower extremity.    12/29 CXR  Tiny pleural effusions with left lower lobe airspace disease

## 2019-12-31 NOTE — CARE UPDATE
This note also relates to the following rows which could not be included:  Pulse - Cannot attach notes to unvalidated device data  Resp - Cannot attach notes to unvalidated device data       12/30/19 2350   Patient Assessment/Suction   Level of Consciousness (AVPU) alert   Respiratory Effort Normal;Unlabored   Expansion/Accessory Muscles/Retractions expansion symmetric;no retractions;no use of accessory muscles   All Lung Fields Breath Sounds diminished   Rhythm/Pattern, Respiratory no shortness of breath reported;pattern regular;unlabored   PRE-TX-O2   O2 Device (Oxygen Therapy) nasal cannula   $ Is the patient on Low Flow Oxygen? Yes   Flow (L/min) 2   SpO2 96 %   Pulse Oximetry Type Continuous   Respiratory Interventions   Cough And Deep Breathing done with encouragement   Breathing Techniques/Airway Clearance deep/controlled cough encouraged;diaphragmatic breathing promoted   Respiratory Evaluation   $ Care Plan Tech Time 15 min   Home Oxygen   Has Home Oxygen? No   Home Aerosol, MDI, DPI, and Other Treatments/Therapies   Home Respiratory Therapy Per Patient/Review of Chart No

## 2019-12-31 NOTE — CONSULTS
[facility]  Cardiology Consult  Patient Name: Jennifer Alarcon  MRN: [id number]  Patient Class: OP- Observation   Admission Date: 12/29/2019  4:50 PM  Length of Stay: 0  Attending Physician:   Primary Care Provider: Gustavo Adams MD  Face-to-Face encounter date: 12/29/2019  Code Status:  Full cold  MPOA:  Chief Complaint: Shortness of Breath (X FEW DAYS); Cough; and Palpitations        Patient information was obtained from patient, past medical records and ER records.   HISTORY OF PRESENT ILLNESS:   Jennifer Alarcon is a 87 y.o. old  female who  has a past medical history of Atrial fibrillation, controlled, Bullous pemphigoid, Colon cancer, Diabetes mellitus, type 2, Eczema, Hypertension, and Hypothyroidism.. The patient presented to [facility] on 12/29/2019 with a primary complaint of Shortness of Breath (X FEW DAYS); Cough; and Palpitations  .   87-year-old  female presents emergency room with shortness of breath and fatigue.       The patient has a past medical history of atrial fibrillation for 10 years, hypertension type 2 diabetes hyperlipidemia hypothyroidism and remote history of colon cancer.       The patient states for the past 2-3 weeks she has been having worsening shortness of breath and dyspnea with minimal exertion.  She denies associated chest pain, hematemesis hemoptysis lightheadedness dizziness or syncope.   there is no history of prior myocardial infarction angina pectoris or nitroglycerin use.  She has had a remote negative stress test.  She has had a coronary arteriogram by Dr. Horan many years ago which did not reveal any blockages.  The patient was transferred to the CCU for RVR; she is presently on intravenous diltiazem; ventricular rate is in the 70s.  Echocardiogram reveals LVEF of 40%; mitral regurgitation; normal PA systolic pressure     The patient does endorse a nonproductive cough.  She also complaints of right lower  extremity swelling and edema.      She describes her symptoms as moderate in severity with no exacerbating or alleviating factors  REVIEW OF SYSTEMS:   10 Point Review of System was performed and was found to be negative except for that mentioned already in the HPI and      Review of Systems (Negative unless checked off)  ROS   Review of Systems - History obtained from chart review and the patient  General ROS: positive for  - fatigue and malaise  Psychological ROS: negative  ENT ROS: positive for - headaches  Hematological and Lymphatic ROS: negative for - bleeding problems, bruising, night sweats or swollen lymph nodes  Respiratory ROS: positive for - cough, shortness of breath and sputum changes  Cardiovascular ROS: positive for - dyspnea on exertion, orthopnea, palpitations and shortness of breath  Gastrointestinal ROS: no abdominal pain, change in bowel habits, or black or bloody stools  Musculoskeletal ROS: negative  Neurological ROS: no TIA or stroke symptoms           PAST MEDICAL HISTORY:           Past Medical History:   Diagnosis Date    Atrial fibrillation, controlled      Bullous pemphigoid      Colon cancer      Diabetes mellitus, type 2      Eczema      Hypertension      Hypothyroidism           PAST SURGICAL HISTORY:            Past Surgical History:   Procedure Laterality Date    APPENDECTOMY        BREAST SURGERY         lt breast bx    CHOLECYSTECTOMY        COLON SURGERY        EYE SURGERY             ALLERGIES:   Cortisone and Nitrofurantoin monohyd/m-cryst     FAMILY HISTORY:            Family History   Problem Relation Age of Onset    Diabetes Mother      Heart disease Mother      Heart disease Father      Diabetes Father           SOCIAL HISTORY:      Social History           Tobacco Use    Smoking status: Never Smoker    Smokeless tobacco: Never Used   Substance Use Topics    Alcohol use: No         Social History          Substance and Sexual Activity   Sexual Activity  Never         HOME MEDICATIONS:              Prior to Admission medications    Medication Sig Start Date End Date Taking? Authorizing Provider   metoprolol tartrate (LOPRESSOR) 50 MG tablet Take 1 tablet by mouth 2 (two) times daily.  5/1/18   Yes Historical Provider, MD   rivaroxaban (XARELTO) 20 mg Tab Take 15 mg by mouth daily with dinner or evening meal.      Yes Historical Provider, MD   mupirocin (BACTROBAN) 2 % ointment BLANCA A SMALL AMOUNT AA QHS 6/18/18     Historical Provider, MD   triamcinolone acetonide 0.1% (KENALOG) 0.1 % paste triamcinolone 0.1 % topical ointment and dimethicone 5 % topical cream       Historical Provider, MD            PHYSICAL EXAM:   /73   Pulse (!) 127   Temp 98 °F (36.7 °C) (Oral)   Resp 16   SpO2 97%   Vitals Reviewed  General appearance: Well-developed, well-nourished female in no apparent distress.  Skin: No Rash.   Neuro: Motor and sensory exams grossly intact. Good tone. Power in all 4 extremities 5/5.   HENT: Atraumatic head. Moist mucous membranes of oral cavity.  Eyes: Normal extraocular movements.   Neck: Supple. No evidence of lymphadenopathy. No thyroidomegaly.  Lungs: Clear to auscultation bilaterally. No wheezing present.   Heart:  Irregularly irregular heart rhythm, 2/6 systolic murmur, 2+ pitting edema to right lower extremity Abdomen: Soft, non-distended, non-tender. No rebound tenderness/guarding. No masses or organomegaly. Bowel sounds are normal. Bladder is not palpable.   Extremities: No cyanosis, clubbing, or edema.  Psych/mental status: Alert and oriented. Cooperative. Responds appropriately to questions.   EMERGENCY DEPARTMENT LABS AND IMAGING:   Following labs were Reviewed       Recent Labs   Lab 12/29/19  1703   WBC 9.27   HGB 15.0   HCT 47.4      CALCIUM 9.4   ALBUMIN 3.8   PROT 7.4      K 4.5   CO2 28      BUN 23   CREATININE 0.8   ALKPHOS 45*   ALT 17   AST 25   BILITOT 1.8*            CMP       Recent Labs   Lab  12/29/19  1703      K 4.5      CO2 28   *   BUN 23   CREATININE 0.8   CALCIUM 9.4   PROT 7.4   ALBUMIN 3.8   BILITOT 1.8*   ALKPHOS 45*   AST 25   ALT 17   ANIONGAP 8   ESTGFRAFRICA >60.0   EGFRNONAA >60.0   , CBC       Recent Labs   Lab 12/29/19  1703   WBC 9.27   HGB 15.0   HCT 47.4      , INR         Lab Results   Component Value Date     INR 1.9 12/29/2019   , Lipid Panel         Lab Results   Component Value Date     CHOL 188 01/10/2019     HDL 54 01/10/2019     LDLCALC 112 (H) 01/10/2019     TRIG 111 01/10/2019   , Troponin       Recent Labs   Lab 12/29/19  1703   TROPONINI <0.030   , A1C:       Recent Labs   Lab 07/11/19  0948   HGBA1C 6.9*    and All labs within the past 24 hours have been reviewed      Microbiology Results (last 7 days)      ** No results found for the last 168 hours. **          X-Ray Chest AP Portable   Final Result       Tiny pleural effusions with left lower lobe airspace disease           Electronically signed by:       Jane Carter MD   Date:                                                12/29/2019   Time:                                                17:13       X-Ray Chest AP Portable    (Results Pending)      12 lead EKG  Atrial fibrillation with ever trigger response of 150, left axis deviation, poor R-wave progression, incomplete right bundle branch block, inferior Q-waves in 2 3 and AVF, possible biatrial enlargement QTc 259 milliseconds  ASSESSMENT & PLAN:   Jennifer Alarcon is a 87 y.o. female admitted for sob LEROY     1. Paroxsymal A-FiB with RVR  -given IV metoprolol and subsequently IV diltiazem with improved rate control  -continue are metoprolol and p.r.n. IV diltiazem  -2D echo complete  -patient is on Xarelto and admits to compliance with medication-continue home dose of Xarelto  Hopefully, ventricular rate will be better controlled with diuresis.     2.  Possible Left lower lobe Pneumonia  -IV antibiotics with Rocephin and Zithromax     3.  Pleural effusions bilaterally.  -IV diuresing given     4. Acute  CHF  -BNP elevated at 1185  -2D echo complete  Lasix 40 mg IV now and in a.m..  Transition to Lasix p.o. thereafter.       5. Hypothyroidism  -check TSH level     6 Non- Insulin dependent Diabetic  -low-dose NovoLog insulin sliding scale     7.  Right lower extremity edema  -ultrasound of right lower extremity negative for DVT     DVT Prophylaxis: will be placed on Heparin/Lovenox for DVT prophylaxis and will be advised to be as mobile as possible and sit in a chair as tolerated.   ________________________________________________________________________________  Moderate risk for clinical decline secondary to atrial fibrillation, acute heart failure and advanced stage        Discharge Planning and Disposition: No mobility needs. Ambulating well. Good social support system. Patient will be discharged in   Face-to-Face encounter date: 12/29/2019  Encounter included review of the medical records, interviewing and examining the patient face-to-face, discussion with family and other health care providers including emergency medicine physician, admission orders, interpreting lab/test results and formulating a plan of care.   Medical Decision Making during this encounter was  [_] Low Complexity  [_] Moderate Complexity  [x] High Complexity  _________________________________________________________________________________     INPATIENT LIST OF MEDICATIONS      Current Facility-Administered Medications:     acetaminophen tablet 650 mg, 650 mg, Oral, Q8H PRN, Ramona Fall NP    bisacodyl suppository 10 mg, 10 mg, Rectal, Daily PRN, Ramona Fall NP    metoprolol tartrate (LOPRESSOR) tablet 50 mg, 50 mg, Oral, BID, Ramona Fall NP    ondansetron disintegrating tablet 4 mg, 4 mg, Oral, ED 1 Time, Ronaldo Caputo MD    ondansetron injection 4 mg, 4 mg, Intravenous, Q8H PRN, Ramona Fall NP    [START ON 12/30/2019] pantoprazole EC tablet 40 mg, 40 mg,  Oral, Daily, Ramona Fall NP    [START ON 12/30/2019] rivaroxaban tablet 20 mg, 20 mg, Oral, Daily with dinner, Ramona Fall NP    sodium chloride 0.9% flush 10 mL, 10 mL, Intravenous, PRN, Ramona Fall NP    traMADol tablet 50 mg, 50 mg, Oral, Q6H PRN, Ramona Fall NP     Current Outpatient Medications:     metoprolol tartrate (LOPRESSOR) 50 MG tablet, Take 1 tablet by mouth 2 (two) times daily. , Disp: , Rfl:     rivaroxaban (XARELTO) 20 mg Tab, Take 15 mg by mouth daily with dinner or evening meal. , Disp: , Rfl:     mupirocin (BACTROBAN) 2 % ointment, BLANCA A SMALL AMOUNT AA QHS, Disp: , Rfl: 3    triamcinolone acetonide 0.1% (KENALOG) 0.1 % paste, triamcinolone 0.1 % topical ointment and dimethicone 5 % topical cream, Disp: , Rfl:         Scheduled Meds:   metoprolol tartrate  50 mg Oral BID    ondansetron  4 mg Oral ED 1 Time    [START ON 12/30/2019] pantoprazole  40 mg Oral Daily    [START ON 12/30/2019] rivaroxaban  20 mg Oral Daily with dinner      Continuous Infusions:  PRN Meds:.acetaminophen, bisacodyl, ondansetron, sodium chloride 0.9%, traMADol        M Óscar Noe MD [facility]  Cardiology          Revision History

## 2020-01-01 LAB
ALBUMIN SERPL BCP-MCNC: 3.2 G/DL (ref 3.5–5.2)
ALP SERPL-CCNC: 36 U/L (ref 55–135)
ALT SERPL W/O P-5'-P-CCNC: 18 U/L (ref 10–44)
ANION GAP SERPL CALC-SCNC: 10 MMOL/L (ref 8–16)
AST SERPL-CCNC: 20 U/L (ref 10–40)
BASOPHILS # BLD AUTO: 0.03 K/UL (ref 0–0.2)
BASOPHILS NFR BLD: 0.4 % (ref 0–1.9)
BILIRUB SERPL-MCNC: 1.8 MG/DL (ref 0.1–1)
BNP SERPL-MCNC: 484 PG/ML (ref 0–99)
BUN SERPL-MCNC: 14 MG/DL (ref 8–23)
CALCIUM SERPL-MCNC: 9.1 MG/DL (ref 8.7–10.5)
CHLORIDE SERPL-SCNC: 97 MMOL/L (ref 95–110)
CO2 SERPL-SCNC: 33 MMOL/L (ref 23–29)
CREAT SERPL-MCNC: 0.7 MG/DL (ref 0.5–1.4)
DIFFERENTIAL METHOD: ABNORMAL
EOSINOPHIL # BLD AUTO: 0.2 K/UL (ref 0–0.5)
EOSINOPHIL NFR BLD: 3.2 % (ref 0–8)
ERYTHROCYTE [DISTWIDTH] IN BLOOD BY AUTOMATED COUNT: 14.1 % (ref 11.5–14.5)
EST. GFR  (AFRICAN AMERICAN): >60 ML/MIN/1.73 M^2
EST. GFR  (NON AFRICAN AMERICAN): >60 ML/MIN/1.73 M^2
GLUCOSE SERPL-MCNC: 104 MG/DL (ref 70–110)
GLUCOSE SERPL-MCNC: 106 MG/DL (ref 70–110)
GLUCOSE SERPL-MCNC: 143 MG/DL (ref 70–110)
GLUCOSE SERPL-MCNC: 158 MG/DL (ref 70–110)
GLUCOSE SERPL-MCNC: 161 MG/DL (ref 70–110)
GLUCOSE SERPL-MCNC: 213 MG/DL (ref 70–110)
HCT VFR BLD AUTO: 42.1 % (ref 37–48.5)
HGB BLD-MCNC: 13.4 G/DL (ref 12–16)
IMM GRANULOCYTES # BLD AUTO: 0.02 K/UL (ref 0–0.04)
IMM GRANULOCYTES NFR BLD AUTO: 0.3 % (ref 0–0.5)
LYMPHOCYTES # BLD AUTO: 1.7 K/UL (ref 1–4.8)
LYMPHOCYTES NFR BLD: 22.3 % (ref 18–48)
MCH RBC QN AUTO: 28.3 PG (ref 27–31)
MCHC RBC AUTO-ENTMCNC: 31.8 G/DL (ref 32–36)
MCV RBC AUTO: 89 FL (ref 82–98)
MONOCYTES # BLD AUTO: 0.9 K/UL (ref 0.3–1)
MONOCYTES NFR BLD: 11.7 % (ref 4–15)
NEUTROPHILS # BLD AUTO: 4.6 K/UL (ref 1.8–7.7)
NEUTROPHILS NFR BLD: 62.1 % (ref 38–73)
NRBC BLD-RTO: 0 /100 WBC
PLATELET # BLD AUTO: 170 K/UL (ref 150–350)
PMV BLD AUTO: 10.4 FL (ref 9.2–12.9)
POTASSIUM SERPL-SCNC: 3.4 MMOL/L (ref 3.5–5.1)
PROT SERPL-MCNC: 6.2 G/DL (ref 6–8.4)
RBC # BLD AUTO: 4.73 M/UL (ref 4–5.4)
SODIUM SERPL-SCNC: 140 MMOL/L (ref 136–145)
WBC # BLD AUTO: 7.44 K/UL (ref 3.9–12.7)

## 2020-01-01 PROCEDURE — 85025 COMPLETE CBC W/AUTO DIFF WBC: CPT

## 2020-01-01 PROCEDURE — 82962 GLUCOSE BLOOD TEST: CPT

## 2020-01-01 PROCEDURE — 25000003 PHARM REV CODE 250: Performed by: SPECIALIST

## 2020-01-01 PROCEDURE — 36415 COLL VENOUS BLD VENIPUNCTURE: CPT

## 2020-01-01 PROCEDURE — 25000003 PHARM REV CODE 250: Performed by: FAMILY MEDICINE

## 2020-01-01 PROCEDURE — 83880 ASSAY OF NATRIURETIC PEPTIDE: CPT

## 2020-01-01 PROCEDURE — 63600175 PHARM REV CODE 636 W HCPCS: Performed by: SPECIALIST

## 2020-01-01 PROCEDURE — 25000003 PHARM REV CODE 250: Performed by: STUDENT IN AN ORGANIZED HEALTH CARE EDUCATION/TRAINING PROGRAM

## 2020-01-01 PROCEDURE — 63600175 PHARM REV CODE 636 W HCPCS: Performed by: FAMILY MEDICINE

## 2020-01-01 PROCEDURE — 21000000 HC CCU ICU ROOM CHARGE

## 2020-01-01 PROCEDURE — 94761 N-INVAS EAR/PLS OXIMETRY MLT: CPT

## 2020-01-01 PROCEDURE — 21400001 HC TELEMETRY ROOM

## 2020-01-01 PROCEDURE — 80053 COMPREHEN METABOLIC PANEL: CPT

## 2020-01-01 RX ORDER — MAGNESIUM SULFATE 1 G/100ML
1 INJECTION INTRAVENOUS
Status: DISCONTINUED | OUTPATIENT
Start: 2020-01-01 | End: 2020-01-05 | Stop reason: HOSPADM

## 2020-01-01 RX ORDER — MAGNESIUM SULFATE HEPTAHYDRATE 40 MG/ML
2 INJECTION, SOLUTION INTRAVENOUS
Status: DISCONTINUED | OUTPATIENT
Start: 2020-01-01 | End: 2020-01-05 | Stop reason: HOSPADM

## 2020-01-01 RX ORDER — POTASSIUM CHLORIDE 20 MEQ/1
20 TABLET, EXTENDED RELEASE ORAL
Status: DISCONTINUED | OUTPATIENT
Start: 2020-01-01 | End: 2020-01-05 | Stop reason: HOSPADM

## 2020-01-01 RX ORDER — POTASSIUM CHLORIDE 7.45 MG/ML
40 INJECTION INTRAVENOUS
Status: DISCONTINUED | OUTPATIENT
Start: 2020-01-01 | End: 2020-01-05 | Stop reason: HOSPADM

## 2020-01-01 RX ORDER — CALCIUM CHLORIDE IN 0.9 % NACL 1 G/100 ML
1 INTRAVENOUS SOLUTION, PIGGYBACK (ML) INTRAVENOUS
Status: DISCONTINUED | OUTPATIENT
Start: 2020-01-01 | End: 2020-01-05 | Stop reason: HOSPADM

## 2020-01-01 RX ORDER — LANOLIN ALCOHOL/MO/W.PET/CERES
800 CREAM (GRAM) TOPICAL
Status: DISCONTINUED | OUTPATIENT
Start: 2020-01-01 | End: 2020-01-05 | Stop reason: HOSPADM

## 2020-01-01 RX ORDER — POTASSIUM CHLORIDE 20 MEQ/1
40 TABLET, EXTENDED RELEASE ORAL
Status: DISCONTINUED | OUTPATIENT
Start: 2020-01-01 | End: 2020-01-05 | Stop reason: HOSPADM

## 2020-01-01 RX ORDER — DIGOXIN 0.25 MG/ML
INJECTION INTRAMUSCULAR; INTRAVENOUS
Status: COMPLETED
Start: 2020-01-01 | End: 2020-01-01

## 2020-01-01 RX ORDER — POTASSIUM CHLORIDE 7.45 MG/ML
20 INJECTION INTRAVENOUS
Status: DISCONTINUED | OUTPATIENT
Start: 2020-01-01 | End: 2020-01-05 | Stop reason: HOSPADM

## 2020-01-01 RX ORDER — DIGOXIN 0.25 MG/ML
250 INJECTION INTRAMUSCULAR; INTRAVENOUS ONCE
Status: COMPLETED | OUTPATIENT
Start: 2020-01-01 | End: 2020-01-01

## 2020-01-01 RX ORDER — METOPROLOL TARTRATE 25 MG/1
25 TABLET, FILM COATED ORAL ONCE
Status: COMPLETED | OUTPATIENT
Start: 2020-01-01 | End: 2020-01-01

## 2020-01-01 RX ORDER — MAGNESIUM SULFATE HEPTAHYDRATE 40 MG/ML
4 INJECTION, SOLUTION INTRAVENOUS
Status: DISCONTINUED | OUTPATIENT
Start: 2020-01-01 | End: 2020-01-05 | Stop reason: HOSPADM

## 2020-01-01 RX ADMIN — DIGOXIN 250 MCG: 0.25 INJECTION INTRAMUSCULAR; INTRAVENOUS at 03:01

## 2020-01-01 RX ADMIN — CEFTRIAXONE 1 G: 1 INJECTION, SOLUTION INTRAVENOUS at 06:01

## 2020-01-01 RX ADMIN — PANTOPRAZOLE SODIUM 40 MG: 40 TABLET, DELAYED RELEASE ORAL at 06:01

## 2020-01-01 RX ADMIN — METOPROLOL TARTRATE 25 MG: 25 TABLET ORAL at 01:01

## 2020-01-01 RX ADMIN — FUROSEMIDE 40 MG: 10 INJECTION, SOLUTION INTRAMUSCULAR; INTRAVENOUS at 09:01

## 2020-01-01 RX ADMIN — RIVAROXABAN 15 MG: 15 TABLET, FILM COATED ORAL at 05:01

## 2020-01-01 RX ADMIN — METOPROLOL TARTRATE 50 MG: 50 TABLET, FILM COATED ORAL at 09:01

## 2020-01-01 RX ADMIN — METOPROLOL TARTRATE 75 MG: 50 TABLET, FILM COATED ORAL at 09:01

## 2020-01-01 RX ADMIN — AZITHROMYCIN MONOHYDRATE 500 MG: 500 INJECTION, POWDER, LYOPHILIZED, FOR SOLUTION INTRAVENOUS at 06:01

## 2020-01-01 RX ADMIN — POTASSIUM CHLORIDE 40 MEQ: 20 TABLET, EXTENDED RELEASE ORAL at 03:01

## 2020-01-01 NOTE — PROGRESS NOTES
Progress Note  Cardiology    Admit Date: 12/29/2019   LOS: 2 days     Follow-up For:  Atrial fib with RVR.  CHF    Scheduled Meds:   azithromycin  500 mg Intravenous Q24H    cefTRIAXone (ROCEPHIN) IVPB  1 g Intravenous Q24H    furosemide (LASIX) IV  40 mg Intravenous Daily    metoprolol tartrate  25 mg Oral Once    metoprolol tartrate  75 mg Oral BID    pantoprazole  40 mg Oral Daily    rivaroxaban  15 mg Oral Daily with dinner     Continuous Infusions:   dilTIAZem Stopped (12/31/19 1906)     PRN Meds:acetaminophen, bisacodyl, dextrose 50%, dextrose 50%, glucagon (human recombinant), glucose, glucose, insulin aspart U-100, ondansetron, sodium chloride 0.9%, traMADol    Review of patient's allergies indicates:   Allergen Reactions    Cortisone      abd pain    Nitrofurantoin monohyd/m-cryst      leg cramps       SUBJECTIVE:     Interval History: Patient has no complaint of chest pain tightness or shortness of breath.  No palpitation.    Review of Systems  Respiratory: negative for cough, hemoptysis, sputum and wheezing  Cardiovascular: positive for orthopnea, negative for chest pressure/discomfort, palpitations and paroxysmal nocturnal dyspnea  Genitourinary:.    OBJECTIVE:     Vital Signs (Most Recent)  Temp: 97.8 °F (36.6 °C) (01/01/20 1119)  Pulse: (!) 112 (01/01/20 1119)  Resp: (!) 24 (01/01/20 1119)  BP: 101/68 (01/01/20 1119)  SpO2: (!) 94 % (01/01/20 1119)    Vital Signs Range (Last 24H):  Temp:  [97.8 °F (36.6 °C)-98.6 °F (37 °C)]   Pulse:  []   Resp:  [16-34]   BP: ()/(61-73)   SpO2:  [93 %-98 %]       Physical Exam:  Neck: no carotid bruit, no JVD and supple, symmetrical, trachea midline  Lungs: clear to auscultation bilaterally, normal respiratory effort  Heart: irregularly irregular rhythm  Abdomen: soft, non-tender; bowel sounds normal; no masses,  no organomegaly  Extremities: Extremities normal, atraumatic, no cyanosis, clubbing, or edema    Recent Results (from the past 24  hour(s))   POCT glucose    Collection Time: 12/31/19  4:47 PM   Result Value Ref Range    POC Glucose 124 (H) 70 - 110   POCT glucose    Collection Time: 12/31/19  8:27 PM   Result Value Ref Range    POC Glucose 209 (H) 70 - 110   CBC auto differential    Collection Time: 01/01/20  3:45 AM   Result Value Ref Range    WBC 7.44 3.90 - 12.70 K/uL    RBC 4.73 4.00 - 5.40 M/uL    Hemoglobin 13.4 12.0 - 16.0 g/dL    Hematocrit 42.1 37.0 - 48.5 %    Mean Corpuscular Volume 89 82 - 98 fL    Mean Corpuscular Hemoglobin 28.3 27.0 - 31.0 pg    Mean Corpuscular Hemoglobin Conc 31.8 (L) 32.0 - 36.0 g/dL    RDW 14.1 11.5 - 14.5 %    Platelets 170 150 - 350 K/uL    MPV 10.4 9.2 - 12.9 fL    Immature Granulocytes 0.3 0.0 - 0.5 %    Gran # (ANC) 4.6 1.8 - 7.7 K/uL    Immature Grans (Abs) 0.02 0.00 - 0.04 K/uL    Lymph # 1.7 1.0 - 4.8 K/uL    Mono # 0.9 0.3 - 1.0 K/uL    Eos # 0.2 0.0 - 0.5 K/uL    Baso # 0.03 0.00 - 0.20 K/uL    nRBC 0 0 /100 WBC    Gran% 62.1 38.0 - 73.0 %    Lymph% 22.3 18.0 - 48.0 %    Mono% 11.7 4.0 - 15.0 %    Eosinophil% 3.2 0.0 - 8.0 %    Basophil% 0.4 0.0 - 1.9 %    Differential Method Automated    Comprehensive metabolic panel    Collection Time: 01/01/20  3:45 AM   Result Value Ref Range    Sodium 140 136 - 145 mmol/L    Potassium 3.4 (L) 3.5 - 5.1 mmol/L    Chloride 97 95 - 110 mmol/L    CO2 33 (H) 23 - 29 mmol/L    Glucose 106 70 - 110 mg/dL    BUN, Bld 14 8 - 23 mg/dL    Creatinine 0.7 0.5 - 1.4 mg/dL    Calcium 9.1 8.7 - 10.5 mg/dL    Total Protein 6.2 6.0 - 8.4 g/dL    Albumin 3.2 (L) 3.5 - 5.2 g/dL    Total Bilirubin 1.8 (H) 0.1 - 1.0 mg/dL    Alkaline Phosphatase 36 (L) 55 - 135 U/L    AST 20 10 - 40 U/L    ALT 18 10 - 44 U/L    Anion Gap 10 8 - 16 mmol/L    eGFR if African American >60.0 >60 mL/min/1.73 m^2    eGFR if non African American >60.0 >60 mL/min/1.73 m^2   Brain natriuretic peptide    Collection Time: 01/01/20  3:45 AM   Result Value Ref Range     (H) 0 - 99 pg/mL   POCT glucose     Collection Time: 01/01/20  5:50 AM   Result Value Ref Range    POC Glucose 104 70 - 110   POCT glucose    Collection Time: 01/01/20 10:59 AM   Result Value Ref Range    POC Glucose 213 (H) 70 - 110   POCT glucose    Collection Time: 01/01/20 12:03 PM   Result Value Ref Range    POC Glucose 161 (H) 70 - 110       Diagnostic Results:  Labs: Reviewed  ECG: Reviewed    ASSESSMENT/PLAN:     Heart rate ranges from the high 90s to 150 B p.m..  Metoprolol 25 mg additional dose now; increase metoprolol to 75 mg b.i.d..  Chest x-ray in a.m.

## 2020-01-01 NOTE — SUBJECTIVE & OBJECTIVE
Interval History:      Review of Systems   Per interval history, all other systems reviewed and negative.     Objective:     Vital Signs (Most Recent):  Temp: 97.8 °F (36.6 °C) (01/01/20 1119)  Pulse: (!) 112 (01/01/20 1119)  Resp: (!) 24 (01/01/20 1119)  BP: 101/68 (01/01/20 1119)  SpO2: (!) 94 % (01/01/20 1119) Vital Signs (24h Range):  Temp:  [97.8 °F (36.6 °C)-98.6 °F (37 °C)] 97.8 °F (36.6 °C)  Pulse:  [] 112  Resp:  [16-34] 24  SpO2:  [93 %-98 %] 94 %  BP: ()/(61-73) 101/68     Weight: 53.4 kg (117 lb 11.6 oz)  Body mass index is 19.59 kg/m².    Intake/Output Summary (Last 24 hours) at 1/1/2020 1447  Last data filed at 1/1/2020 1300  Gross per 24 hour   Intake 100 ml   Output 1800 ml   Net -1700 ml      Physical Exam   Constitutional: She is oriented to person, place, and time. She appears well-developed and well-nourished.   HENT:   Head: Atraumatic.   Eyes: Pupils are equal, round, and reactive to light. Conjunctivae and EOM are normal.   Neck: Normal range of motion. Neck supple. No JVD present. No thyromegaly present.   Cardiovascular: Normal rate. Exam reveals no gallop and no friction rub.   Murmur heard.  IRR   Pulmonary/Chest: Effort normal and breath sounds normal. She has no wheezes. She has no rales.   Abdominal: Soft. Bowel sounds are normal. She exhibits no distension. There is no tenderness.   Musculoskeletal: Normal range of motion. She exhibits edema (1+ right).   Neurological: She is alert and oriented to person, place, and time. She has normal reflexes.   Skin: Skin is warm and dry.   Psychiatric: She has a normal mood and affect. Her behavior is normal.   Nursing note and vitals reviewed.      Significant Labs:   CBC:   Recent Labs   Lab 12/31/19  0434 01/01/20  0345   WBC 7.53 7.44   HGB 13.3 13.4   HCT 41.5 42.1    170     CMP:   Recent Labs   Lab 12/31/19  0434 01/01/20  0345    140   K 3.5 3.4*   CL 96 97   CO2 33* 33*   * 106   BUN 17 14   CREATININE 0.7  0.7   CALCIUM 9.0 9.1   PROT 6.5 6.2   ALBUMIN 3.5 3.2*   BILITOT 1.5* 1.8*   ALKPHOS 41* 36*   AST 24 20   ALT 22 18   ANIONGAP 11 10   EGFRNONAA >60.0 >60.0     TSH:   Recent Labs   Lab 12/29/19  2136   TSH 3.720       Significant Imaging: I have reviewed all pertinent imaging results/findings within the past 24 hours.   cxr 12/31  Impression:       Stable small left pleural effusion with left lower lobe airspace disease and tiny right pleural effusion

## 2020-01-01 NOTE — CARE UPDATE
This note also relates to the following rows which could not be included:  SpO2 - Cannot attach notes to unvalidated device data  Pulse - Cannot attach notes to unvalidated device data       12/31/19 1944   Patient Assessment/Suction   Level of Consciousness (AVPU)   (SLEEPING)   Respiratory Effort Normal;Unlabored   Expansion/Accessory Muscles/Retractions expansion symmetric;no retractions;no use of accessory muscles   All Lung Fields Breath Sounds diminished   Rhythm/Pattern, Respiratory snoring;no shortness of breath reported;pattern regular;unlabored   PRE-TX-O2   O2 Device (Oxygen Therapy) nasal cannula   Flow (L/min) 1   Pulse Oximetry Type Continuous   Resp 16   Respiratory Evaluation   $ Care Plan Tech Time 15 min   Admitting Diagnosis A FIB   Cardiac Diagnosis A FIB

## 2020-01-01 NOTE — PROGRESS NOTES
[facility]  Progress Note    Patient Name: Jennifer Alarcon  MRN: [id number]  Patient Class: IP- Inpatient   Admission Date: 12/29/2019  Length of Stay: 2 days  Attending Physician: Nely Bernabe DO  Primary Care Provider: Gustavo Adams MD    Subjective:     Principal Problem:Paroxysmal atrial fibrillation  Chief Complaint   Patient presents with    Shortness of Breath     X FEW DAYS    Cough    Palpitations       Interval History: elevated HR today.  Diltiazem drip.  Secondary to hypotension.  Patient seen by Cardiology with recommendation to increase metoprolol to 75 mg b.i.d..  Patient seen and examined denies chest pain, shortness of breath, dizziness, lightheadedness, nausea, vomiting, fever, chills.    Review of Systems   Per interval history, all other systems reviewed and negative.     Objective:     Vital Signs (Most Recent):  Temp: 97.8 °F (36.6 °C) (01/01/20 1119)  Pulse: (!) 112 (01/01/20 1119)  Resp: (!) 24 (01/01/20 1119)  BP: 101/68 (01/01/20 1119)  SpO2: (!) 94 % (01/01/20 1119) Vital Signs (24h Range):  Temp:  [97.8 °F (36.6 °C)-98.6 °F (37 °C)] 97.8 °F (36.6 °C)  Pulse:  [] 112  Resp:  [16-34] 24  SpO2:  [93 %-98 %] 94 %  BP: ()/(61-73) 101/68     Weight: 53.4 kg (117 lb 11.6 oz)  Body mass index is 19.59 kg/m².    Intake/Output Summary (Last 24 hours) at 1/1/2020 1447  Last data filed at 1/1/2020 1300  Gross per 24 hour   Intake 100 ml   Output 1800 ml   Net -1700 ml      Physical Exam   Constitutional: She is oriented to person, place, and time. She appears well-developed and well-nourished.   HENT:   Head: Atraumatic.   Eyes: Pupils are equal, round, and reactive to light. Conjunctivae and EOM are normal.   Neck: Normal range of motion. Neck supple. No JVD present. No thyromegaly present.   Cardiovascular: Normal rate. Exam reveals no gallop and no friction rub.   Murmur heard.  IRR   Pulmonary/Chest: Effort normal and breath sounds normal.  She has no wheezes. She has no rales.   Abdominal: Soft. Bowel sounds are normal. She exhibits no distension. There is no tenderness.   Musculoskeletal: Normal range of motion. She exhibits edema (1+ right).   Neurological: She is alert and oriented to person, place, and time. She has normal reflexes.   Skin: Skin is warm and dry.   Psychiatric: She has a normal mood and affect. Her behavior is normal.   Nursing note and vitals reviewed.      Significant Labs:   CBC:   Recent Labs   Lab 12/31/19  0434 01/01/20  0345   WBC 7.53 7.44   HGB 13.3 13.4   HCT 41.5 42.1    170     CMP:   Recent Labs   Lab 12/31/19  0434 01/01/20  0345    140   K 3.5 3.4*   CL 96 97   CO2 33* 33*   * 106   BUN 17 14   CREATININE 0.7 0.7   CALCIUM 9.0 9.1   PROT 6.5 6.2   ALBUMIN 3.5 3.2*   BILITOT 1.5* 1.8*   ALKPHOS 41* 36*   AST 24 20   ALT 22 18   ANIONGAP 11 10   EGFRNONAA >60.0 >60.0     TSH:   Recent Labs   Lab 12/29/19  2136   TSH 3.720       Significant Imaging: I have reviewed all pertinent imaging results/findings within the past 24 hours.   cxr 12/31  Impression:       Stable small left pleural effusion with left lower lobe airspace disease and tiny right pleural effusion       Assessment/Plan:    Paroxysmal A-fib with RVR  - Cardiology consulted  - c/w Xarelto  -  increase metoprolol to 75 mg b.i.d.  - diltiazem drip stopped 2/2 hypotension  - ECHO completed: EF 40%, MR  - diuresis for acute on chronic CHF     Possible LLL PNA  - AM CXR   - Procal negative  - will continue with IV Rocephin and azithromycin for now        Acute on Chronic CHF  Pleural Effusions  - BNP 1185 on admission and improved to 484 today  - ECHO with EF 40%  - Lasix 40 mg IV q day  - UOP 3.88 L since admission  - strict in/out, daily wts, low salt diet        Hypothyroidism  - TSH wnl  - not on medications  - stable     Non- Insulin dependent Diabetes  - Last A1c 6.8 on 12/30/19  - POCT glucose QACHS  - Low dose SSI        Right Lower  extremity edema  - US negative for dvt  - monitoring      VTE Risk Mitigation (From admission, onward)         Ordered     rivaroxaban tablet 15 mg  With dinner      12/29/19 2053     Place STACIE hose  Until discontinued      12/29/19 2053     IP VTE LOW RISK PATIENT  Once      12/29/19 2053                Nely Bernabe DO  Department of Hospital Medicine   [facility]

## 2020-01-02 LAB
ALBUMIN SERPL BCP-MCNC: 3.2 G/DL (ref 3.5–5.2)
ALP SERPL-CCNC: 38 U/L (ref 55–135)
ALT SERPL W/O P-5'-P-CCNC: 17 U/L (ref 10–44)
ANION GAP SERPL CALC-SCNC: 11 MMOL/L (ref 8–16)
AST SERPL-CCNC: 20 U/L (ref 10–40)
BASOPHILS # BLD AUTO: 0.03 K/UL (ref 0–0.2)
BASOPHILS NFR BLD: 0.4 % (ref 0–1.9)
BILIRUB SERPL-MCNC: 1.9 MG/DL (ref 0.1–1)
BUN SERPL-MCNC: 12 MG/DL (ref 8–23)
CALCIUM SERPL-MCNC: 9.3 MG/DL (ref 8.7–10.5)
CHLORIDE SERPL-SCNC: 96 MMOL/L (ref 95–110)
CO2 SERPL-SCNC: 32 MMOL/L (ref 23–29)
CREAT SERPL-MCNC: 0.7 MG/DL (ref 0.5–1.4)
DIFFERENTIAL METHOD: NORMAL
EOSINOPHIL # BLD AUTO: 0.1 K/UL (ref 0–0.5)
EOSINOPHIL NFR BLD: 1 % (ref 0–8)
ERYTHROCYTE [DISTWIDTH] IN BLOOD BY AUTOMATED COUNT: 14 % (ref 11.5–14.5)
EST. GFR  (AFRICAN AMERICAN): >60 ML/MIN/1.73 M^2
EST. GFR  (NON AFRICAN AMERICAN): >60 ML/MIN/1.73 M^2
GLUCOSE SERPL-MCNC: 116 MG/DL (ref 70–110)
GLUCOSE SERPL-MCNC: 129 MG/DL (ref 70–110)
GLUCOSE SERPL-MCNC: 130 MG/DL (ref 70–110)
GLUCOSE SERPL-MCNC: 150 MG/DL (ref 70–110)
HCT VFR BLD AUTO: 42.5 % (ref 37–48.5)
HGB BLD-MCNC: 13.8 G/DL (ref 12–16)
IMM GRANULOCYTES # BLD AUTO: 0.02 K/UL (ref 0–0.04)
IMM GRANULOCYTES NFR BLD AUTO: 0.2 % (ref 0–0.5)
LYMPHOCYTES # BLD AUTO: 1.9 K/UL (ref 1–4.8)
LYMPHOCYTES NFR BLD: 24 % (ref 18–48)
MCH RBC QN AUTO: 28.8 PG (ref 27–31)
MCHC RBC AUTO-ENTMCNC: 32.5 G/DL (ref 32–36)
MCV RBC AUTO: 89 FL (ref 82–98)
MONOCYTES # BLD AUTO: 1 K/UL (ref 0.3–1)
MONOCYTES NFR BLD: 12.2 % (ref 4–15)
NEUTROPHILS # BLD AUTO: 5 K/UL (ref 1.8–7.7)
NEUTROPHILS NFR BLD: 62.2 % (ref 38–73)
NRBC BLD-RTO: 0 /100 WBC
PLATELET # BLD AUTO: 181 K/UL (ref 150–350)
PMV BLD AUTO: 10.2 FL (ref 9.2–12.9)
POTASSIUM SERPL-SCNC: 3.9 MMOL/L (ref 3.5–5.1)
PROT SERPL-MCNC: 6.5 G/DL (ref 6–8.4)
RBC # BLD AUTO: 4.79 M/UL (ref 4–5.4)
SODIUM SERPL-SCNC: 139 MMOL/L (ref 136–145)
WBC # BLD AUTO: 8.03 K/UL (ref 3.9–12.7)

## 2020-01-02 PROCEDURE — 85025 COMPLETE CBC W/AUTO DIFF WBC: CPT

## 2020-01-02 PROCEDURE — 25000003 PHARM REV CODE 250: Performed by: FAMILY MEDICINE

## 2020-01-02 PROCEDURE — 94761 N-INVAS EAR/PLS OXIMETRY MLT: CPT

## 2020-01-02 PROCEDURE — 99900035 HC TECH TIME PER 15 MIN (STAT)

## 2020-01-02 PROCEDURE — 25000003 PHARM REV CODE 250: Performed by: SPECIALIST

## 2020-01-02 PROCEDURE — 63600175 PHARM REV CODE 636 W HCPCS: Performed by: FAMILY MEDICINE

## 2020-01-02 PROCEDURE — 36415 COLL VENOUS BLD VENIPUNCTURE: CPT

## 2020-01-02 PROCEDURE — 80053 COMPREHEN METABOLIC PANEL: CPT

## 2020-01-02 PROCEDURE — 82962 GLUCOSE BLOOD TEST: CPT

## 2020-01-02 PROCEDURE — 21000000 HC CCU ICU ROOM CHARGE

## 2020-01-02 RX ORDER — POLYETHYLENE GLYCOL 3350 17 G/17G
17 POWDER, FOR SOLUTION ORAL ONCE
Status: COMPLETED | OUTPATIENT
Start: 2020-01-02 | End: 2020-01-02

## 2020-01-02 RX ORDER — FUROSEMIDE 20 MG/1
20 TABLET ORAL DAILY
Status: DISCONTINUED | OUTPATIENT
Start: 2020-01-03 | End: 2020-01-05 | Stop reason: HOSPADM

## 2020-01-02 RX ORDER — DIGOXIN 250 MCG
0.25 TABLET ORAL DAILY
Status: COMPLETED | OUTPATIENT
Start: 2020-01-02 | End: 2020-01-04

## 2020-01-02 RX ORDER — DOCUSATE CALCIUM 240 MG
240 CAPSULE ORAL DAILY
Status: DISCONTINUED | OUTPATIENT
Start: 2020-01-02 | End: 2020-01-05 | Stop reason: HOSPADM

## 2020-01-02 RX ADMIN — AZITHROMYCIN MONOHYDRATE 500 MG: 500 INJECTION, POWDER, LYOPHILIZED, FOR SOLUTION INTRAVENOUS at 04:01

## 2020-01-02 RX ADMIN — METOPROLOL TARTRATE 75 MG: 50 TABLET, FILM COATED ORAL at 09:01

## 2020-01-02 RX ADMIN — PANTOPRAZOLE SODIUM 40 MG: 40 TABLET, DELAYED RELEASE ORAL at 09:01

## 2020-01-02 RX ADMIN — FUROSEMIDE 40 MG: 10 INJECTION, SOLUTION INTRAMUSCULAR; INTRAVENOUS at 09:01

## 2020-01-02 RX ADMIN — RIVAROXABAN 15 MG: 15 TABLET, FILM COATED ORAL at 04:01

## 2020-01-02 RX ADMIN — DOCUSATE CALCIUM 240 MG: 240 CAPSULE, LIQUID FILLED ORAL at 02:01

## 2020-01-02 RX ADMIN — DIGOXIN 0.25 MG: 250 TABLET ORAL at 02:01

## 2020-01-02 RX ADMIN — POLYETHYLENE GLYCOL 3350 17 G: 17 POWDER, FOR SOLUTION ORAL at 02:01

## 2020-01-02 RX ADMIN — CEFTRIAXONE 1 G: 1 INJECTION, SOLUTION INTRAVENOUS at 02:01

## 2020-01-02 NOTE — PROGRESS NOTES
[facility], [facility] Medicine  Progress Note    Patient Name: Jennifer Alarcon  MRN: [id number]  Patient Class: IP- Inpatient   Admission Date: 12/29/2019  Length of Stay: 3 days  Attending Physician: Nely Bernabe DO  Primary Care Provider: Gustavo Adams MD      Subjective:     Principal Problem:Paroxysmal atrial fibrillation  Chief Complaint   Patient presents with    Shortness of Breath     X FEW DAYS    Cough    Palpitations       Interval History:  Afib rate 80-90's when at rest and increases to 110's-130's with movement or ambulation to [facility].  Patient seen and examined at bedside this morning.  Denies chest pain, palpitations, chest pressure, shortness of breath, dizziness, lightheadedness, changes sensation, n/v/d/c.     Review of Systems   Per interval history, all other systems reviewed and negative.     Objective:     Vital Signs (Most Recent):  Temp: 97.7 °F (36.5 °C) (01/02/20 0745)  Pulse: 91 (01/02/20 0513)  Resp: 20 (01/02/20 0745)  BP: (!) 109/56 (01/02/20 0745)  SpO2: 95 % (01/02/20 0745) Vital Signs (24h Range):  Temp:  [97.7 °F (36.5 °C)-98.2 °F (36.8 °C)] 97.7 °F (36.5 °C)  Pulse:  [] 91  Resp:  [20-33] 20  SpO2:  [90 %-96 %] 95 %  BP: ()/(56-68) 109/56     Weight: 52.6 kg (115 lb 15.4 oz)  Body mass index is 19.3 kg/m².    Intake/Output Summary (Last 24 hours) at 1/2/2020 0904  Last data filed at 1/2/2020 0247  Gross per 24 hour   Intake 400 ml   Output 1500 ml   Net -1100 ml      Physical Exam   Constitutional: She is oriented to person, place, and time. She appears well-developed and well-nourished.   HENT:   Head: Atraumatic.   Eyes: Pupils are equal, round, and reactive to light. Conjunctivae and EOM are normal.   Neck: Normal range of motion. Neck supple. No JVD present. No thyromegaly present.   Cardiovascular: Normal rate. Exam reveals no gallop and no friction rub.   Murmur heard.  IRR   Pulmonary/Chest: Effort normal and breath sounds normal. She has  no wheezes. She has no rales.   Abdominal: Soft. Bowel sounds are normal. She exhibits no distension. There is no tenderness.   Musculoskeletal: Normal range of motion. She exhibits edema (1+ right).   Neurological: She is alert and oriented to person, place, and time. She has normal reflexes.   Skin: Skin is warm and dry.   Psychiatric: She has a normal mood and affect. Her behavior is normal.   Nursing note and vitals reviewed.      Significant Labs:   CBC:   Recent Labs   Lab 01/01/20  0345 01/02/20  0350   WBC 7.44 8.03   HGB 13.4 13.8   HCT 42.1 42.5    181     CMP:   Recent Labs   Lab 01/01/20  0345 01/02/20  0350    139   K 3.4* 3.9   CL 97 96   CO2 33* 32*    116*   BUN 14 12   CREATININE 0.7 0.7   CALCIUM 9.1 9.3   PROT 6.2 6.5   ALBUMIN 3.2* 3.2*   BILITOT 1.8* 1.9*   ALKPHOS 36* 38*   AST 20 20   ALT 18 17   ANIONGAP 10 11   EGFRNONAA >60.0 >60.0     All pertinent labs within the past 24 hours have been reviewed.    Significant Imaging: I have reviewed all pertinent imaging results/findings within the past 24 hours.   XR CHEST AP PORTABLE    CLINICAL HISTORY:  chf;    FINDINGS:  Portable chest at 04:15 is compared to 12/31/2019 shows normal cardiomediastinal silhouette.    There is improvement of the left lower lobe airspace disease and tiny pleural effusions.  The remainder of the lungs are clear.  Pulmonary vasculature is normal. No acute osseous abnormality.   Impression:       Improvement of the left lower lobe airspace disease and tiny pleural effusions           Assessment/Plan:     Paroxysmal A-fib with RVR  - Cardiology consulted  - c/w Xarelto  -  metoprolol to 75 mg b.i.d.  - ECHO completed: EF 40%, MR  - diuresis for acute on chronic CHF  - AM CXR improvement to LLL airspace dz and pleural effusions     Possible LLL PNA  - AM CXR   - Procal negative  - will continue with IV Rocephin and azithromycin for now        Acute on Chronic CHF  Pleural Effusions  - BNP 1185 on  admission and improved to 484   - ECHO with EF 40%  - Lasix 40 mg IV q day  - UOP 4.180 L since admission  - strict in/out, daily wts, low salt diet        Hypothyroidism  - TSH wnl  - not on medications  - stable     Non- Insulin dependent Diabetes  - Last A1c 6.8 on 12/30/19  - POCT glucose QACHS  - Low dose SSI        Right Lower extremity edema  - US negative for dvt        VTE Risk Mitigation (From admission, onward)         Ordered     rivaroxaban tablet 15 mg  With dinner      12/29/19 2053     Place STACIE hose  Until discontinued      12/29/19 2053     IP VTE LOW RISK PATIENT  Once      12/29/19 2053                Nely Bernabe DO  Department of Hospital Medicine   [facility]

## 2020-01-02 NOTE — PLAN OF CARE
Problem: Oral Intake Inadequate  Goal: Improved Oral Intake  Outcome: Ongoing, Progressing  Intervention: Promote and Optimize Oral Intake  Flowsheets (Taken 1/2/2020 1145)  Oral Nutrition Promotion: calorie dense liquids provided   Added Glucerna once daily to aid in meeting estimated needs.   Encourage intake of meals and supplements and provide assistance when needed.

## 2020-01-02 NOTE — PROGRESS NOTES
Progress Note  Cardiology    Admit Date: 12/29/2019   LOS: 3 days     Follow-up For:  Atrial fib with RVR; CHF    Scheduled Meds:   digoxin  0.25 mg Oral Daily    docusate calcium  240 mg Oral Daily    furosemide (LASIX) IV  40 mg Intravenous Daily    metoprolol tartrate  75 mg Oral BID    pantoprazole  40 mg Oral Daily    polyethylene glycol  17 g Oral Once    rivaroxaban  15 mg Oral Daily with dinner     Continuous Infusions:   dilTIAZem Stopped (12/31/19 1906)     PRN Meds:acetaminophen, bisacodyl, calcium chloride IVPB, calcium chloride IVPB, calcium chloride IVPB, dextrose 50%, dextrose 50%, glucagon (human recombinant), glucose, glucose, insulin aspart U-100, magnesium oxide, magnesium sulfate IVPB, magnesium sulfate IVPB, magnesium sulfate IVPB, magnesium sulfate IVPB, ondansetron, potassium chloride in water, potassium chloride in water, potassium chloride in water, potassium chloride in water, potassium chloride, potassium chloride, potassium chloride, potassium chloride, sodium chloride 0.9%, sodium phosphate IVPB, sodium phosphate IVPB, sodium phosphate IVPB, sodium phosphate IVPB, sodium phosphate IVPB, traMADol    Review of patient's allergies indicates:   Allergen Reactions    Cortisone      abd pain    Nitrofurantoin monohyd/m-cryst      leg cramps       SUBJECTIVE:     Interval History: Patient has no complaint of shortness of breath, chest pain or palpitation.    Review of Systems  Respiratory: negative for cough, hemoptysis, sputum and wheezing  Cardiovascular: negative for chest pressure/discomfort, dyspnea, palpitations and paroxysmal nocturnal dyspnea    OBJECTIVE:     Vital Signs (Most Recent)  Temp: 97.9 °F (36.6 °C) (01/02/20 1127)  Pulse: 90 (01/02/20 1127)  Resp: (!) 29 (01/02/20 1127)  BP: (!) 107/57 (01/02/20 1127)  SpO2: (!) 94 % (01/02/20 1100)    Vital Signs Range (Last 24H):  Temp:  [97.7 °F (36.5 °C)-98.2 °F (36.8 °C)]   Pulse:  []   Resp:  [15-44]   BP:  ()/(56-67)   SpO2:  [90 %-96 %]       Physical Exam:  Neck: JVD - 2 cm above sternal notch, no carotid bruit and supple, symmetrical, trachea midline  Lungs: clear to auscultation bilaterally, normal respiratory effort  Heart: irregularly irregular rhythm and Heart rate up to 146 B p.m.; intermittent RVR  Abdomen: soft, non-tender; bowel sounds normal; no masses,  no organomegaly  Extremities: Extremities normal, atraumatic, no cyanosis, clubbing, or edema    Recent Results (from the past 24 hour(s))   POCT glucose    Collection Time: 01/01/20  4:15 PM   Result Value Ref Range    POC Glucose 143 (H) 70 - 110   POCT glucose    Collection Time: 01/01/20  8:55 PM   Result Value Ref Range    POC Glucose 158 (H) 70 - 110   CBC auto differential    Collection Time: 01/02/20  3:50 AM   Result Value Ref Range    WBC 8.03 3.90 - 12.70 K/uL    RBC 4.79 4.00 - 5.40 M/uL    Hemoglobin 13.8 12.0 - 16.0 g/dL    Hematocrit 42.5 37.0 - 48.5 %    Mean Corpuscular Volume 89 82 - 98 fL    Mean Corpuscular Hemoglobin 28.8 27.0 - 31.0 pg    Mean Corpuscular Hemoglobin Conc 32.5 32.0 - 36.0 g/dL    RDW 14.0 11.5 - 14.5 %    Platelets 181 150 - 350 K/uL    MPV 10.2 9.2 - 12.9 fL    Immature Granulocytes 0.2 0.0 - 0.5 %    Gran # (ANC) 5.0 1.8 - 7.7 K/uL    Immature Grans (Abs) 0.02 0.00 - 0.04 K/uL    Lymph # 1.9 1.0 - 4.8 K/uL    Mono # 1.0 0.3 - 1.0 K/uL    Eos # 0.1 0.0 - 0.5 K/uL    Baso # 0.03 0.00 - 0.20 K/uL    nRBC 0 0 /100 WBC    Gran% 62.2 38.0 - 73.0 %    Lymph% 24.0 18.0 - 48.0 %    Mono% 12.2 4.0 - 15.0 %    Eosinophil% 1.0 0.0 - 8.0 %    Basophil% 0.4 0.0 - 1.9 %    Differential Method Automated    Comprehensive metabolic panel    Collection Time: 01/02/20  3:50 AM   Result Value Ref Range    Sodium 139 136 - 145 mmol/L    Potassium 3.9 3.5 - 5.1 mmol/L    Chloride 96 95 - 110 mmol/L    CO2 32 (H) 23 - 29 mmol/L    Glucose 116 (H) 70 - 110 mg/dL    BUN, Bld 12 8 - 23 mg/dL    Creatinine 0.7 0.5 - 1.4 mg/dL    Calcium  9.3 8.7 - 10.5 mg/dL    Total Protein 6.5 6.0 - 8.4 g/dL    Albumin 3.2 (L) 3.5 - 5.2 g/dL    Total Bilirubin 1.9 (H) 0.1 - 1.0 mg/dL    Alkaline Phosphatase 38 (L) 55 - 135 U/L    AST 20 10 - 40 U/L    ALT 17 10 - 44 U/L    Anion Gap 11 8 - 16 mmol/L    eGFR if African American >60.0 >60 mL/min/1.73 m^2    eGFR if non African American >60.0 >60 mL/min/1.73 m^2   POCT glucose    Collection Time: 01/02/20  5:10 AM   Result Value Ref Range    POC Glucose 130 (H) 70 - 110   POCT glucose    Collection Time: 01/02/20 11:30 AM   Result Value Ref Range    POC Glucose 129 (H) 70 - 110       Diagnostic Results:  Labs: Reviewed  ECG: Reviewed  X-Ray: Reviewed    ASSESSMENT/PLAN:     CHF appears better.  Transition to Lasix p.o..  Digoxin 0.25 mg p.o. for 3 days and then 0.125 mg/day.  Patient is on metoprolol 75 mg b.i.d. now.  LVEF approximately 40%.

## 2020-01-02 NOTE — SUBJECTIVE & OBJECTIVE
Interval History:     Review of Systems   Per interval history, all other systems reviewed and negative.     Objective:     Vital Signs (Most Recent):  Temp: 97.7 °F (36.5 °C) (01/02/20 0745)  Pulse: 91 (01/02/20 0513)  Resp: 20 (01/02/20 0745)  BP: (!) 109/56 (01/02/20 0745)  SpO2: 95 % (01/02/20 0745) Vital Signs (24h Range):  Temp:  [97.7 °F (36.5 °C)-98.2 °F (36.8 °C)] 97.7 °F (36.5 °C)  Pulse:  [] 91  Resp:  [20-33] 20  SpO2:  [90 %-96 %] 95 %  BP: ()/(56-68) 109/56     Weight: 52.6 kg (115 lb 15.4 oz)  Body mass index is 19.3 kg/m².    Intake/Output Summary (Last 24 hours) at 1/2/2020 0904  Last data filed at 1/2/2020 0247  Gross per 24 hour   Intake 400 ml   Output 1500 ml   Net -1100 ml      Physical Exam   Constitutional: She is oriented to person, place, and time. She appears well-developed and well-nourished.   HENT:   Head: Atraumatic.   Eyes: Pupils are equal, round, and reactive to light. Conjunctivae and EOM are normal.   Neck: Normal range of motion. Neck supple. No JVD present. No thyromegaly present.   Cardiovascular: Normal rate. Exam reveals no gallop and no friction rub.   Murmur heard.  IRR   Pulmonary/Chest: Effort normal and breath sounds normal. She has no wheezes. She has no rales.   Abdominal: Soft. Bowel sounds are normal. She exhibits no distension. There is no tenderness.   Musculoskeletal: Normal range of motion. She exhibits edema (1+ right).   Neurological: She is alert and oriented to person, place, and time. She has normal reflexes.   Skin: Skin is warm and dry.   Psychiatric: She has a normal mood and affect. Her behavior is normal.   Nursing note and vitals reviewed.      Significant Labs:   CBC:   Recent Labs   Lab 01/01/20  0345 01/02/20  0350   WBC 7.44 8.03   HGB 13.4 13.8   HCT 42.1 42.5    181     CMP:   Recent Labs   Lab 01/01/20  0345 01/02/20  0350    139   K 3.4* 3.9   CL 97 96   CO2 33* 32*    116*   BUN 14 12   CREATININE 0.7 0.7    CALCIUM 9.1 9.3   PROT 6.2 6.5   ALBUMIN 3.2* 3.2*   BILITOT 1.8* 1.9*   ALKPHOS 36* 38*   AST 20 20   ALT 18 17   ANIONGAP 10 11   EGFRNONAA >60.0 >60.0     All pertinent labs within the past 24 hours have been reviewed.    Significant Imaging: I have reviewed all pertinent imaging results/findings within the past 24 hours.   XR CHEST AP PORTABLE    CLINICAL HISTORY:  chf;    FINDINGS:  Portable chest at 04:15 is compared to 12/31/2019 shows normal cardiomediastinal silhouette.    There is improvement of the left lower lobe airspace disease and tiny pleural effusions.  The remainder of the lungs are clear.  Pulmonary vasculature is normal. No acute osseous abnormality.   Impression:       Improvement of the left lower lobe airspace disease and tiny pleural effusions

## 2020-01-02 NOTE — CARE UPDATE
01/01/20 8490   Patient Assessment/Suction   Level of Consciousness (AVPU) alert   Respiratory Effort Unlabored   Expansion/Accessory Muscles/Retractions no use of accessory muscles   All Lung Fields Breath Sounds clear;diminished   Rhythm/Pattern, Respiratory no shortness of breath reported   Cough Frequency no cough   PRE-TX-O2   O2 Device (Oxygen Therapy) room air   SpO2 96 %   Pulse Oximetry Type Continuous   $ Pulse Oximetry - Multiple Charge Pulse Oximetry - Multiple   Pulse 96   Resp (!) 21   Positioning   Head of Bed (HOB) HOB at 20-30 degrees

## 2020-01-02 NOTE — PROGRESS NOTES
"[facility]  Adult Nutrition   Progress Note (Follow-Up)    SUMMARY     Recommendations/Interventions:  1. Continue current diet as tolerated, encourage intake.   2. Added Glucerna once daily to aid in meeting estimated needs.   3. Encourage intake of meals and supplements and provide assistance when needed.  Goals:   1. Pt to meet at least 75% of estimated needs via PO intake of meals and supplements.  Nutrition Goal Status: new    Reason for Assessment    Reason For Assessment: length of stay  Diagnosis: (A-Fib)  Interdisciplinary Rounds: attended    Nutrition Risk Screen    Nutrition Risk Screen: no indicators present       Pressure Injury 12/30/19 1359 Left lower Buttocks #1 Stage 2-Wound Image: Images linked       Pressure Injury 12/30/19 1401 Right Buttocks Stage 2-Wound Image: Images linked  MST Score: 0  Have you recently lost weight without trying?: No  Weight loss score: 0  Have you been eating poorly because of a decreased appetite?: No  Appetite score: 0       Nutrition/Diet History    Patient Reported Diet/Restrictions/Preferences: general  Food Allergies: NKFA  Factors Affecting Nutritional Intake: decreased appetite    Anthropometrics    Temp: 97.9 °F (36.6 °C)  Height: 5' 5" (165.1 cm)  Height (inches): 65 in  Weight Method: Standard Scale  Weight: 52.6 kg (115 lb 15.4 oz)  Weight (lb): 115.96 lb  Ideal Body Weight (IBW), Female: 125 lb  BMI (Calculated): 19.3  BMI Grade: 18.5-24.9 - normal     Weight History:  Wt Readings from Last 10 Encounters:   01/02/20 52.6 kg (115 lb 15.4 oz)   12/30/19 54 kg (119 lb)   12/02/19 54.4 kg (120 lb)   06/17/19 54 kg (119 lb)   01/21/19 54 kg (119 lb)   10/01/18 52.2 kg (115 lb)   06/28/18 51.7 kg (114 lb)   05/02/18 52.2 kg (115 lb)   12/27/17 54 kg (119 lb)     Lab/Procedures/Meds: Pertinent Labs Reviewed  Clinical Chemistry:  Recent Labs   Lab 01/02/20  0350      K 3.9   CL 96   CO2 32*   *   BUN 12   CREATININE 0.7   CALCIUM 9.3 "   PROT 6.5   ALBUMIN 3.2*   BILITOT 1.9*   ALKPHOS 38*   AST 20   ALT 17   ANIONGAP 11   ESTGFRAFRICA >60.0   EGFRNONAA >60.0     CBC:   Recent Labs   Lab 01/02/20  0350   WBC 8.03   RBC 4.79   HGB 13.8   HCT 42.5      MCV 89   MCH 28.8   MCHC 32.5     Cardiac Profile:  Recent Labs   Lab 12/29/19  1703 12/29/19  2136 12/30/19  0009 12/30/19 0618 01/01/20  0345   BNP 1,185*  --   --   --  484*   TROPONINI <0.030 <0.030 <0.030 <0.030  --      Diabetes:  Recent Labs   Lab 12/30/19 0618   HGBA1C 6.8*     Thyroid & Parathyroid:  Recent Labs   Lab 12/29/19 2136   TSH 3.720       Medications: Pertinent Medications reviewed  Scheduled Meds:   furosemide (LASIX) IV  40 mg Intravenous Daily    metoprolol tartrate  75 mg Oral BID    pantoprazole  40 mg Oral Daily    rivaroxaban  15 mg Oral Daily with dinner     Continuous Infusions:   dilTIAZem Stopped (12/31/19 1906)     PRN Meds:.acetaminophen, bisacodyl, calcium chloride IVPB, calcium chloride IVPB, calcium chloride IVPB, dextrose 50%, dextrose 50%, glucagon (human recombinant), glucose, glucose, insulin aspart U-100, magnesium oxide, magnesium sulfate IVPB, magnesium sulfate IVPB, magnesium sulfate IVPB, magnesium sulfate IVPB, ondansetron, potassium chloride in water, potassium chloride in water, potassium chloride in water, potassium chloride in water, potassium chloride, potassium chloride, potassium chloride, potassium chloride, sodium chloride 0.9%, sodium phosphate IVPB, sodium phosphate IVPB, sodium phosphate IVPB, sodium phosphate IVPB, sodium phosphate IVPB, traMADol    Estimated/Assessed Needs    Weight Used For Calorie Calculations: 52.6 kg (115 lb 15.4 oz)  Energy Calorie Requirements (kcal): 1310-0650 kcals/day (30-35 kcals/kg)  Energy Need Method: Kcal/kg  Protein Requirements: 63-79 g/day (1.2-1.5 g/kg)  Weight Used For Protein Calculations: 52.6 kg (115 lb 15.4 oz)     Estimated Fluid Requirement Method: RDA Method    Nutrition Prescription  "Ordered    Current Diet Order: Cardiac /Low Sodium Diet     Evaluation of Received Nutrient/Fluid Intake    Energy Calories Required: meeting needs  Protein Required: meeting needs  Fluid Required: meeting needs  Tolerance: tolerating  % Intake of Estimated Energy Needs: 75 - 100 %  % Meal Intake: 75 - 100 %    Intake/Output Summary (Last 24 hours) at 1/2/2020 1135  Last data filed at 1/2/2020 0900  Gross per 24 hour   Intake 640 ml   Output 1700 ml   Net -1060 ml      Nutrition Risk    Level of Risk/Frequency of Follow-up: moderate     Dietitian Rounds Brief:  Seen 2' LOS. Admitted for A-Fib with RVR. BNP improving. Appetite and intake good. Offered ONS when meal intake inadequate, patient accepted. Follows "a healthy diet" at home. No recent weight loss. Denies N/V/D,swallowing, or chewing issues. Will continue to monitor intake and labs.    Monitor and Evaluation    Food and Nutrient Intake: energy intake, food and beverage intake  Food and Nutrient Adminstration: diet order  Physical Activity and Function: nutrition-related ADLs and IADLs  Anthropometric Measurements: weight, weight change  Biochemical Data, Medical Tests and Procedures: inflammatory profile, electrolyte and renal panel, lipid profile, gastrointestinal profile, glucose/endocrine profile  Nutrition-Focused Physical Findings: overall appearance     Nutrition Follow-Up    RD Follow-up?: Yes  Jadyn Galindo RD 01/02/2020 11:43 AM     "

## 2020-01-03 LAB
ALBUMIN SERPL BCP-MCNC: 3.3 G/DL (ref 3.5–5.2)
ALP SERPL-CCNC: 40 U/L (ref 55–135)
ALT SERPL W/O P-5'-P-CCNC: 17 U/L (ref 10–44)
ANION GAP SERPL CALC-SCNC: 10 MMOL/L (ref 8–16)
AST SERPL-CCNC: 19 U/L (ref 10–40)
BASOPHILS # BLD AUTO: 0.05 K/UL (ref 0–0.2)
BASOPHILS NFR BLD: 0.7 % (ref 0–1.9)
BILIRUB SERPL-MCNC: 2 MG/DL (ref 0.1–1)
BUN SERPL-MCNC: 13 MG/DL (ref 8–23)
CALCIUM SERPL-MCNC: 9.1 MG/DL (ref 8.7–10.5)
CHLORIDE SERPL-SCNC: 99 MMOL/L (ref 95–110)
CO2 SERPL-SCNC: 28 MMOL/L (ref 23–29)
CREAT SERPL-MCNC: 0.6 MG/DL (ref 0.5–1.4)
DIFFERENTIAL METHOD: NORMAL
EOSINOPHIL # BLD AUTO: 0.1 K/UL (ref 0–0.5)
EOSINOPHIL NFR BLD: 0.7 % (ref 0–8)
ERYTHROCYTE [DISTWIDTH] IN BLOOD BY AUTOMATED COUNT: 13.8 % (ref 11.5–14.5)
EST. GFR  (AFRICAN AMERICAN): >60 ML/MIN/1.73 M^2
EST. GFR  (NON AFRICAN AMERICAN): >60 ML/MIN/1.73 M^2
GLUCOSE SERPL-MCNC: 101 MG/DL (ref 70–110)
GLUCOSE SERPL-MCNC: 103 MG/DL (ref 70–110)
GLUCOSE SERPL-MCNC: 146 MG/DL (ref 70–110)
GLUCOSE SERPL-MCNC: 168 MG/DL (ref 70–110)
GLUCOSE SERPL-MCNC: 169 MG/DL (ref 70–110)
HCT VFR BLD AUTO: 44.3 % (ref 37–48.5)
HGB BLD-MCNC: 14.4 G/DL (ref 12–16)
IMM GRANULOCYTES # BLD AUTO: 0.02 K/UL (ref 0–0.04)
IMM GRANULOCYTES NFR BLD AUTO: 0.3 % (ref 0–0.5)
LYMPHOCYTES # BLD AUTO: 1.9 K/UL (ref 1–4.8)
LYMPHOCYTES NFR BLD: 25.5 % (ref 18–48)
MCH RBC QN AUTO: 28.6 PG (ref 27–31)
MCHC RBC AUTO-ENTMCNC: 32.5 G/DL (ref 32–36)
MCV RBC AUTO: 88 FL (ref 82–98)
MONOCYTES # BLD AUTO: 1 K/UL (ref 0.3–1)
MONOCYTES NFR BLD: 12.7 % (ref 4–15)
NEUTROPHILS # BLD AUTO: 4.6 K/UL (ref 1.8–7.7)
NEUTROPHILS NFR BLD: 60.1 % (ref 38–73)
NRBC BLD-RTO: 0 /100 WBC
PLATELET # BLD AUTO: 198 K/UL (ref 150–350)
PMV BLD AUTO: 10.3 FL (ref 9.2–12.9)
POTASSIUM SERPL-SCNC: 4 MMOL/L (ref 3.5–5.1)
PROT SERPL-MCNC: 6.7 G/DL (ref 6–8.4)
RBC # BLD AUTO: 5.04 M/UL (ref 4–5.4)
SODIUM SERPL-SCNC: 137 MMOL/L (ref 136–145)
WBC # BLD AUTO: 7.56 K/UL (ref 3.9–12.7)

## 2020-01-03 PROCEDURE — 25000003 PHARM REV CODE 250: Performed by: FAMILY MEDICINE

## 2020-01-03 PROCEDURE — 94761 N-INVAS EAR/PLS OXIMETRY MLT: CPT

## 2020-01-03 PROCEDURE — 63600175 PHARM REV CODE 636 W HCPCS: Performed by: FAMILY MEDICINE

## 2020-01-03 PROCEDURE — 82962 GLUCOSE BLOOD TEST: CPT

## 2020-01-03 PROCEDURE — 21000000 HC CCU ICU ROOM CHARGE

## 2020-01-03 PROCEDURE — 25000003 PHARM REV CODE 250: Performed by: STUDENT IN AN ORGANIZED HEALTH CARE EDUCATION/TRAINING PROGRAM

## 2020-01-03 PROCEDURE — 97162 PT EVAL MOD COMPLEX 30 MIN: CPT

## 2020-01-03 PROCEDURE — 97165 OT EVAL LOW COMPLEX 30 MIN: CPT

## 2020-01-03 PROCEDURE — 25000003 PHARM REV CODE 250: Performed by: SPECIALIST

## 2020-01-03 PROCEDURE — 36415 COLL VENOUS BLD VENIPUNCTURE: CPT

## 2020-01-03 PROCEDURE — 85025 COMPLETE CBC W/AUTO DIFF WBC: CPT

## 2020-01-03 PROCEDURE — 80053 COMPREHEN METABOLIC PANEL: CPT

## 2020-01-03 RX ORDER — METOPROLOL TARTRATE 50 MG/1
100 TABLET ORAL 2 TIMES DAILY
Status: DISCONTINUED | OUTPATIENT
Start: 2020-01-03 | End: 2020-01-05 | Stop reason: HOSPADM

## 2020-01-03 RX ORDER — POLYETHYLENE GLYCOL 3350 17 G/17G
17 POWDER, FOR SOLUTION ORAL 2 TIMES DAILY
Status: DISCONTINUED | OUTPATIENT
Start: 2020-01-03 | End: 2020-01-05 | Stop reason: HOSPADM

## 2020-01-03 RX ADMIN — ACETAMINOPHEN 650 MG: 325 TABLET ORAL at 11:01

## 2020-01-03 RX ADMIN — ONDANSETRON 4 MG: 2 INJECTION INTRAMUSCULAR; INTRAVENOUS at 11:01

## 2020-01-03 RX ADMIN — METOPROLOL TARTRATE 75 MG: 50 TABLET, FILM COATED ORAL at 09:01

## 2020-01-03 RX ADMIN — PANTOPRAZOLE SODIUM 40 MG: 40 TABLET, DELAYED RELEASE ORAL at 07:01

## 2020-01-03 RX ADMIN — TRAMADOL HYDROCHLORIDE 50 MG: 50 TABLET, FILM COATED ORAL at 09:01

## 2020-01-03 RX ADMIN — BISACODYL 10 MG: 10 SUPPOSITORY RECTAL at 09:01

## 2020-01-03 RX ADMIN — DIGOXIN 0.25 MG: 250 TABLET ORAL at 09:01

## 2020-01-03 RX ADMIN — TRAMADOL HYDROCHLORIDE 50 MG: 50 TABLET, FILM COATED ORAL at 04:01

## 2020-01-03 RX ADMIN — POLYETHYLENE GLYCOL 3350 17 G: 17 POWDER, FOR SOLUTION ORAL at 08:01

## 2020-01-03 RX ADMIN — DOCUSATE CALCIUM 240 MG: 240 CAPSULE, LIQUID FILLED ORAL at 09:01

## 2020-01-03 RX ADMIN — POLYETHYLENE GLYCOL 3350 17 G: 17 POWDER, FOR SOLUTION ORAL at 09:01

## 2020-01-03 RX ADMIN — METOPROLOL TARTRATE 100 MG: 50 TABLET, FILM COATED ORAL at 08:01

## 2020-01-03 RX ADMIN — FUROSEMIDE 20 MG: 20 TABLET ORAL at 09:01

## 2020-01-03 RX ADMIN — RIVAROXABAN 15 MG: 15 TABLET, FILM COATED ORAL at 04:01

## 2020-01-03 NOTE — PROGRESS NOTES
Progress Note  Cardiology    Admit Date: 12/29/2019   LOS: 4 days     Follow-up For:  Atrial fibrillation with RVR; mild CHF; small left pleural effusion    Scheduled Meds:   digoxin  0.25 mg Oral Daily    docusate calcium  240 mg Oral Daily    furosemide  20 mg Oral Daily    metoprolol tartrate  75 mg Oral BID    pantoprazole  40 mg Oral Daily    polyethylene glycol  17 g Oral BID    rivaroxaban  15 mg Oral Daily with dinner     Continuous Infusions:   dilTIAZem Stopped (12/31/19 1906)     PRN Meds:acetaminophen, bisacodyl, calcium chloride IVPB, calcium chloride IVPB, calcium chloride IVPB, dextrose 50%, dextrose 50%, glucagon (human recombinant), glucose, glucose, insulin aspart U-100, magnesium oxide, magnesium sulfate IVPB, magnesium sulfate IVPB, magnesium sulfate IVPB, magnesium sulfate IVPB, ondansetron, potassium chloride in water, potassium chloride in water, potassium chloride in water, potassium chloride in water, potassium chloride, potassium chloride, potassium chloride, potassium chloride, sodium chloride 0.9%, sodium phosphate IVPB, sodium phosphate IVPB, sodium phosphate IVPB, sodium phosphate IVPB, sodium phosphate IVPB, traMADol    Review of patient's allergies indicates:   Allergen Reactions    Cortisone      abd pain    Nitrofurantoin monohyd/m-cryst      leg cramps       SUBJECTIVE:     Interval History: Patient has no complaint of chest pain tightness, shortness of breath or palpitation.    Review of Systems  Respiratory: negative for cough, hemoptysis and sputum  Cardiovascular: negative for chest pressure/discomfort, dyspnea, palpitations and paroxysmal nocturnal dyspnea    OBJECTIVE:     Vital Signs (Most Recent)  Temp: 97.9 °F (36.6 °C) (01/03/20 0701)  Pulse: 95 (01/03/20 0701)  Resp: 20 (01/03/20 0701)  BP: (!) 127/58 (01/03/20 0701)  SpO2: 95 % (01/03/20 0701)    Vital Signs Range (Last 24H):  Temp:  [97.9 °F (36.6 °C)-98.6 °F (37 °C)]   Pulse:  []   Resp:  [18-27]   BP:  (111-127)/(57-82)   SpO2:  [10 %-96 %]       Physical Exam:  Neck: no carotid bruit, no JVD and supple, symmetrical, trachea midline  Lungs: clear to auscultation bilaterally, normal respiratory effort  Heart: irregularly irregular rhythm and Intermittent RVR  Abdomen: soft, non-tender; bowel sounds normal; no masses,  no organomegaly  Extremities: Extremities normal, atraumatic, no cyanosis, clubbing, or edema    Recent Results (from the past 24 hour(s))   POCT glucose    Collection Time: 01/02/20  8:42 PM   Result Value Ref Range    POC Glucose 150 (H) 70 - 110   CBC auto differential    Collection Time: 01/03/20  3:15 AM   Result Value Ref Range    WBC 7.56 3.90 - 12.70 K/uL    RBC 5.04 4.00 - 5.40 M/uL    Hemoglobin 14.4 12.0 - 16.0 g/dL    Hematocrit 44.3 37.0 - 48.5 %    Mean Corpuscular Volume 88 82 - 98 fL    Mean Corpuscular Hemoglobin 28.6 27.0 - 31.0 pg    Mean Corpuscular Hemoglobin Conc 32.5 32.0 - 36.0 g/dL    RDW 13.8 11.5 - 14.5 %    Platelets 198 150 - 350 K/uL    MPV 10.3 9.2 - 12.9 fL    Immature Granulocytes 0.3 0.0 - 0.5 %    Gran # (ANC) 4.6 1.8 - 7.7 K/uL    Immature Grans (Abs) 0.02 0.00 - 0.04 K/uL    Lymph # 1.9 1.0 - 4.8 K/uL    Mono # 1.0 0.3 - 1.0 K/uL    Eos # 0.1 0.0 - 0.5 K/uL    Baso # 0.05 0.00 - 0.20 K/uL    nRBC 0 0 /100 WBC    Gran% 60.1 38.0 - 73.0 %    Lymph% 25.5 18.0 - 48.0 %    Mono% 12.7 4.0 - 15.0 %    Eosinophil% 0.7 0.0 - 8.0 %    Basophil% 0.7 0.0 - 1.9 %    Differential Method Automated    Comprehensive metabolic panel    Collection Time: 01/03/20  3:15 AM   Result Value Ref Range    Sodium 137 136 - 145 mmol/L    Potassium 4.0 3.5 - 5.1 mmol/L    Chloride 99 95 - 110 mmol/L    CO2 28 23 - 29 mmol/L    Glucose 103 70 - 110 mg/dL    BUN, Bld 13 8 - 23 mg/dL    Creatinine 0.6 0.5 - 1.4 mg/dL    Calcium 9.1 8.7 - 10.5 mg/dL    Total Protein 6.7 6.0 - 8.4 g/dL    Albumin 3.3 (L) 3.5 - 5.2 g/dL    Total Bilirubin 2.0 (H) 0.1 - 1.0 mg/dL    Alkaline Phosphatase 40 (L) 55 -  135 U/L    AST 19 10 - 40 U/L    ALT 17 10 - 44 U/L    Anion Gap 10 8 - 16 mmol/L    eGFR if African American >60.0 >60 mL/min/1.73 m^2    eGFR if non African American >60.0 >60 mL/min/1.73 m^2   POCT glucose    Collection Time: 01/03/20  5:37 AM   Result Value Ref Range    POC Glucose 101 70 - 110   POCT glucose    Collection Time: 01/03/20 11:15 AM   Result Value Ref Range    POC Glucose 169 (H) 70 - 110       Diagnostic Results:  Labs: Reviewed  ECG: Reviewed    ASSESSMENT/PLAN:     Ventricular rate appears to be better controlled.  She continues with RVR with mild activity.  Lasix 20 mg daily.  Will increase metoprolol to 100 mg b.i.d. this evening.  She may need additional digoxin; she is on 0.25 mg-has received 2 doses only thus far.

## 2020-01-03 NOTE — PROGRESS NOTES
[facility] Medicine  Progress Note    Patient Name: Jennifer Alarcon  MRN: [id number]  Patient Class: IP- Inpatient   Admission Date: 12/29/2019  Length of Stay: 4 days  Attending Physician: Nely Bernabe DO  Primary Care Provider: Gustavo Adams MD      Subjective:     Principal Problem:Paroxysmal atrial fibrillation  Chief Complaint   Patient presents with    Shortness of Breath     X FEW DAYS    Cough    Palpitations       Interval History: afib with HR mostly in 90's but elevated to 140's at times.   Patient seen examined at bedside.  She denies chest pain, palpitations, dizziness, lightheadedness, nausea, fever chills.  Patient reports constipation with last bowel movement reported Sunday. Denies ab Pain or distention.    Review of Systems   Denies any CP, SOB, N/V/D, headaches, fever or chills.     Objective:     Vital Signs (Most Recent):  Temp: 98.1 °F (36.7 °C) (01/03/20 0324)  Pulse: 84 (01/03/20 0324)  Resp: 20 (01/03/20 0701)  BP: (!) 111/57 (01/03/20 0324)  SpO2: 96 % (01/03/20 0324) Vital Signs (24h Range):  Temp:  [97.9 °F (36.6 °C)-98.6 °F (37 °C)] 98.1 °F (36.7 °C)  Pulse:  [] 84  Resp:  [18-30] 20  SpO2:  [10 %-96 %] 96 %  BP: (107-116)/(57-82) 111/57     Weight: 55.7 kg (122 lb 12.7 oz)  Body mass index is 20.43 kg/m².    Intake/Output Summary (Last 24 hours) at 1/3/2020 0831  Last data filed at 1/3/2020 0550  Gross per 24 hour   Intake 905 ml   Output 1000 ml   Net -95 ml      Physical Exam   Constitutional: She is oriented to person, place, and time. She appears well-developed and well-nourished.   HENT:   Head: Atraumatic.   Eyes: Pupils are equal, round, and reactive to light. Conjunctivae and EOM are normal.   Neck: Normal range of motion. Neck supple. No JVD present. No thyromegaly present.   Cardiovascular: Normal rate. Exam reveals no gallop and no friction rub.   Murmur heard.  IRR  Rate 90's    Pulmonary/Chest: Effort normal and breath sounds  normal. She has no wheezes. She has no rales.   Abdominal: Soft. Bowel sounds are normal. She exhibits no distension. There is no tenderness.   Musculoskeletal: Normal range of motion. She exhibits no edema.   Neurological: She is alert and oriented to person, place, and time. She has normal reflexes.   Skin: Skin is warm and dry.   Psychiatric: She has a normal mood and affect. Her behavior is normal.   Nursing note and vitals reviewed.      Significant Labs:   CBC:   Recent Labs   Lab 01/02/20  0350 01/03/20  0315   WBC 8.03 7.56   HGB 13.8 14.4   HCT 42.5 44.3    198     CMP:   Recent Labs   Lab 01/02/20  0350 01/03/20 0315    137   K 3.9 4.0   CL 96 99   CO2 32* 28   * 103   BUN 12 13   CREATININE 0.7 0.6   CALCIUM 9.3 9.1   PROT 6.5 6.7   ALBUMIN 3.2* 3.3*   BILITOT 1.9* 2.0*   ALKPHOS 38* 40*   AST 20 19   ALT 17 17   ANIONGAP 11 10   EGFRNONAA >60.0 >60.0     All pertinent labs within the past 24 hours have been reviewed.    Significant Imaging: no new images      Assessment/Plan:     Paroxysmal A-fib with RVR  - HR improved to 90's at rest but elevated with movement  -Cardiology consulted  - c/w Xarelto  -  metoprolol to 75 mg b.i.d. And digoxin  - ECHO completed: EF 40%, MR    Constipation  - stool softeners ordered  - OOB  - Ok to use bathroom instead of BSC with assistance    Possible LLL PNA  - 1/2/20 cxr showing improvement of LLL pleural effusion. AFVSS, procal negative  - will dc abx at this time.       Acute on Chronic CHF  Pleural Effusions  - BNP 1185 on admission and improved to 484   - ECHO with EF 40%  - transition to oral Lasix 20 mg  - UOP 4.275 L since admission  - strict in/out, daily wts, low salt diet        Hypothyroidism  - TSH wnl  - not on medications  - stable     Non- Insulin dependent Diabetes  - Last A1c 6.8 on 12/30/19  - POCT glucose QACHS  - Low dose SSI          VTE Risk Mitigation (From admission, onward)         Ordered     rivaroxaban tablet 15 mg  With  dinner      12/29/19 2053     Place STACIE hose  Until discontinued      12/29/19 2053     IP VTE LOW RISK PATIENT  Once      12/29/19 2053              Nely Bernabe DO  Department of Hospital Medicine   [facility]

## 2020-01-03 NOTE — PLAN OF CARE
This note also relates to the following rows which could not be included:  SpO2 - Cannot attach notes to unvalidated device data  Pulse - Cannot attach notes to unvalidated device data       01/03/20 0701   Patient Assessment/Suction   Level of Consciousness (AVPU) alert   Respiratory Effort Normal;Unlabored   Expansion/Accessory Muscles/Retractions no use of accessory muscles   PRE-TX-O2   O2 Device (Oxygen Therapy) room air   Pulse Oximetry Type Continuous   $ Pulse Oximetry - Multiple Charge Pulse Oximetry - Multiple   Resp 20

## 2020-01-03 NOTE — SUBJECTIVE & OBJECTIVE
Interval History: afib with HR mostly in 90's but elevated to 140's at times.   Patient seen examined at bedside.  She denies chest pain, palpitations, dizziness, lightheadedness, nausea, fever chills.  Patient reports constipation with last bowel movement reported Sunday. Denies ab Pain or distention.    Review of Systems   Denies any CP, SOB, N/V/D, headaches, fever or chills.     Objective:     Vital Signs (Most Recent):  Temp: 98.1 °F (36.7 °C) (01/03/20 0324)  Pulse: 84 (01/03/20 0324)  Resp: 20 (01/03/20 0701)  BP: (!) 111/57 (01/03/20 0324)  SpO2: 96 % (01/03/20 0324) Vital Signs (24h Range):  Temp:  [97.9 °F (36.6 °C)-98.6 °F (37 °C)] 98.1 °F (36.7 °C)  Pulse:  [] 84  Resp:  [18-30] 20  SpO2:  [10 %-96 %] 96 %  BP: (107-116)/(57-82) 111/57     Weight: 55.7 kg (122 lb 12.7 oz)  Body mass index is 20.43 kg/m².    Intake/Output Summary (Last 24 hours) at 1/3/2020 0831  Last data filed at 1/3/2020 0550  Gross per 24 hour   Intake 905 ml   Output 1000 ml   Net -95 ml      Physical Exam   Constitutional: She is oriented to person, place, and time. She appears well-developed and well-nourished.   HENT:   Head: Atraumatic.   Eyes: Pupils are equal, round, and reactive to light. Conjunctivae and EOM are normal.   Neck: Normal range of motion. Neck supple. No JVD present. No thyromegaly present.   Cardiovascular: Normal rate. Exam reveals no gallop and no friction rub.   Murmur heard.  IRR  Rate 90's    Pulmonary/Chest: Effort normal and breath sounds normal. She has no wheezes. She has no rales.   Abdominal: Soft. Bowel sounds are normal. She exhibits no distension. There is no tenderness.   Musculoskeletal: Normal range of motion. She exhibits no edema.   Neurological: She is alert and oriented to person, place, and time. She has normal reflexes.   Skin: Skin is warm and dry.   Psychiatric: She has a normal mood and affect. Her behavior is normal.   Nursing note and vitals reviewed.      Significant Labs:    CBC:   Recent Labs   Lab 01/02/20  0350 01/03/20  0315   WBC 8.03 7.56   HGB 13.8 14.4   HCT 42.5 44.3    198     CMP:   Recent Labs   Lab 01/02/20  0350 01/03/20  0315    137   K 3.9 4.0   CL 96 99   CO2 32* 28   * 103   BUN 12 13   CREATININE 0.7 0.6   CALCIUM 9.3 9.1   PROT 6.5 6.7   ALBUMIN 3.2* 3.3*   BILITOT 1.9* 2.0*   ALKPHOS 38* 40*   AST 20 19   ALT 17 17   ANIONGAP 11 10   EGFRNONAA >60.0 >60.0     All pertinent labs within the past 24 hours have been reviewed.    Significant Imaging: no new images

## 2020-01-03 NOTE — PT/OT/SLP EVAL
Physical Therapy Evaluation    Patient Name:  Jennifer Alarcon   MRN:  [id number]    Recommendations:     Discharge Recommendations:  nursing facility, skilled( Pt lives alone)  Discharge Equipment Recommendations: walker, rolling   Barriers to discharge: Decreased caregiver support    Assessment:     Jennifer Alarcon is a 87 y.o. female admitted with a medical diagnosis of Paroxysmal atrial fibrillation.  She presents with the following impairments/functional limitations:  weakness, impaired endurance, gait instability, impaired functional mobilty, pain, impaired sensation, impaired balance, impaired self care skills, decreased lower extremity function, impaired cardiopulmonary response to activity. Pt able to stand and take steps. HR 's and in a fib. Limited by pain, nausea, and elevated HR.    Rehab Prognosis: Good; patient would benefit from acute skilled PT services to address these deficits and reach maximum level of function.    Recent Surgery: * No surgery found *      Plan:     During this hospitalization, patient to be seen daily to address the identified rehab impairments via gait training, therapeutic activities, therapeutic exercises and progress toward the following goals:    · Plan of Care Expires:  02/03/20    Subjective     Chief Complaint: Pt states she is nauseous after ambulating-nurse notified. Also states R knee hurts  Patient/Family Comments/goals: get better  Pain/Comfort:  · Pain Rating 1: 3/10  · Location - Side 1: Right  · Location 1: knee  · Pain Addressed 1: Reposition, Distraction  · Pain Rating Post-Intervention 1: 3/10    Patients cultural, spiritual, Druze conflicts given the current situation: no    Living Environment:  Lives alone in a single story home. No steps to enter  Prior to admission, patients level of function was mod I.  Equipment used at home:  Straight cane.  DME owned (not currently used): rolling walker.  Upon discharge, patient will have assistance from  ???.    Objective:     Communicated with nurse prior to session.  Patient found supine with bed alarm, peripheral IV, telemetry, pulse ox (continuous)  upon PT entry to room.    General Precautions: Standard, fall   Orthopedic Precautions:N/A   Braces: N/A     Exams:  · Cognitive Exam:  Patient is oriented to Person, Place, Time and Situation  · Sensation:    · -       Intact  · RLE ROM: Deficits: nt due to pain. At least 3/5  · LLE Strength: 4/5 throughout    Functional Mobility:  · Bed Mobility:     · Supine to Sit: minimum assistance  · Transfers:     · Sit to Stand:  minimum assistance with rolling walker  · Gait: 5 ft with rw and min A. C/o nausea with gait. Antalgic gait on R LE with slow speed      Therapeutic Activities and Exercises:   educated on the importance of OOB to chair to negate deconditioning    AM-PAC 6 CLICK MOBILITY  Total Score:15     Patient left supine with all lines intact and call button in reach.    GOALS:   Multidisciplinary Problems     Physical Therapy Goals        Problem: Physical Therapy Goal    Goal Priority Disciplines Outcome Goal Variances Interventions   Physical Therapy Goal     PT, PT/OT      Description:  Goals to be met by: 2020     Patient will increase functional independence with mobility by performin. Supine to sit with Stand-by Assistance  2. Sit to stand transfer with Supervision  3. Bed to chair transfer with Stand-by Assistance using Rolling Walker  4. Gait  x 100 feet with Minimal Assistance using Rolling Walker.                       History:     Past Medical History:   Diagnosis Date    Atrial fibrillation, controlled     Bullous pemphigoid     Colon cancer     Diabetes mellitus, type 2     Eczema     Hypertension     Hypothyroidism        Past Surgical History:   Procedure Laterality Date    APPENDECTOMY      BREAST SURGERY      lt breast bx    CHOLECYSTECTOMY      COLON SURGERY      EYE SURGERY         Time Tracking:     PT Received On:  01/03/20  PT Start Time: 1108     PT Stop Time: 1125  PT Total Time (min): 17 min     Billable Minutes: Evaluation 17 min      Carolin Rees, PT  01/03/2020

## 2020-01-03 NOTE — PHYSICIAN QUERY
PT Name: Jennifer Alarcon  MR #: [id number]    Physician Query Form - Consultant Diagnosis Clarification     CDS/: Song Hassan               Contact information: [phone]  This form is a permanent document in the medical record.     Query Date: January 3, 2020      By submitting this query, we are merely seeking further clarification of documentation.  Please utilize your independent clinical judgment when addressing the question(s) below.      The Medical record contains the following:   Diagnosis Location in Medical Record   Stage 2 Left Lower Buttocks Pressure Injury; Stage 2 Right Buttocks Pressure Injury   12/30 Wound Care RN Cristy Booker          Do you agree with the Consultants (wound care RN) diagnosis of Stage 2 Bilateral Buttocks Pressure Injuries?    [ x ] Yes   [  ] Yes, but it resolved prior to my assessment of the patient   [  ] No   [  ] Clinically insignificant   [  ] Other/Clarification of findings:   [  ] Clinically undetermined

## 2020-01-03 NOTE — PLAN OF CARE
Problem: Adult Inpatient Plan of Care  Goal: Plan of Care Review  Outcome: Ongoing, Progressing     Problem: Adult Inpatient Plan of Care  Goal: Optimal Comfort and Wellbeing  Outcome: Ongoing, Progressing  Intervention: Provide Person-Centered Care  Flowsheets (Taken 1/3/2020 0549)  Trust Relationship/Rapport: care explained; choices provided; questions answered; questions encouraged     Problem: Fall Injury Risk  Goal: Absence of Fall and Fall-Related Injury  Outcome: Ongoing, Progressing  Intervention: Promote Injury-Free Environment  Flowsheets (Taken 1/3/2020 0549)  Safety Promotion/Fall Prevention: assistive device/personal item within reach; bed alarm set; commode/urinal/bedpan at bedside  Environmental Safety Modification: assistive device/personal items within reach

## 2020-01-03 NOTE — PHYSICIAN QUERY
PT Name: Jennifer Alarcon  MR #: [id number]    Physician Query Form - Heart  Condition Clarification     CDS/: Song Hassan               Contact information: [phone]  This form is a permanent document in the medical record.     Query Date: January 3, 2020    By submitting this query, we are merely seeking further clarification of documentation. Please utilize your independent clinical judgment when addressing the question(s) below.    The medical record contains the following   Indicators     Location in Medical Record   X Acute on Chronic Heart Failure documented (without type specified)   Progress notes by Dr. Bernabe and cardiology   X · Decreased left ventricular systolic function. The estimated ejection fraction is 40%  · Local segmental wall motion abnormalities.  · Atrial fibrillation observed.  · Mild left atrial enlargement.  · Mild right atrial enlargement.  · Mild-to-moderate mitral regurgitation.  · Mild pulmonic regurgitation.  · Normal central venous pressure (3 mm Hg).  · The estimated PA systolic pressure is 32 mm Hg  · There is a left pleural effusion.      Echo results 12/30/19        Provider, please clarify the type of CHF:    [   ] Systolic    [   ] Diastolic   [   ] Combined Systolic and Diastolic    [ x  ] HFrEF (note: HFrEF codes to systolic in ICD-10)   [   ] HFpEF (note: HFpEF codes to diastolic in ICD-10)   [   ] Other type (please specify):   [  ] Clinically Undetermined                           Please document in your progress notes daily for the duration of treatment until resolved and include in your discharge summary.

## 2020-01-03 NOTE — PT/OT/SLP EVAL
Occupational Therapy   Evaluation    Name: Jennifer Alarcon  MRN: [id number]  Admitting Diagnosis:  Paroxysmal atrial fibrillation      Recommendations:     Discharge Recommendations: nursing facility, skilled  Discharge Equipment Recommendations:  other (see comments)(TBD)  Barriers to discharge:  Decreased caregiver support    Assessment:     Jennifer Alarcon is a 87 y.o. female with a medical diagnosis of Paroxysmal atrial fibrillation.  She presents with decreased mobility secondary to afib and right knee arthritis pain. Patient agreeable to sit EOB, but not standing this session. Performance deficits affecting function: weakness, impaired endurance, impaired self care skills, impaired functional mobilty.      Rehab Prognosis: Fair; patient would benefit from acute skilled OT services to address these deficits and reach maximum level of function.       Plan:     Patient to be seen 5 x/week to address the above listed problems via self-care/home management, therapeutic activities, therapeutic exercises  · Plan of Care Expires: 02/03/20  · Plan of Care Reviewed with: patient    Subjective     Chief Complaint: pain in right knee  Patient/Family Comments/goals: reduced pain and controlled afib    Occupational Profile:  Living Environment: lives alone in 1 story home. Has 2 sons who lives near by, but work.  Previous level of function: modified independent with ADLs, IADLs, driving.  Roles and Routines: primary homemaker  Equipment Used at Home:  cane, straight, walker, rolling, shower chair, bedside commode  Assistance upon Discharge: 2 adult sons can provide intermittent assistance    Pain/Comfort:  · Pain Rating 1: 4/10  · Location - Side 1: Right  · Location 1: knee  · Pain Rating Post-Intervention 1: 4/10  · Pain Rating Post-Intervention 2: 4/10    Patients cultural, spiritual, Jain conflicts given the current situation: no    Objective:     Communicated with: nurse prior to session.  Patient found supine with  tracheostomy, peripheral IV upon OT entry to room.    General Precautions: Standard, fall   Orthopedic Precautions:N/A   Braces: N/A     Occupational Performance:    Bed Mobility:    · Patient completed Scooting/Bridging with contact guard assistance  · Patient completed Supine to Sit with contact guard assistance  · Patient completed Sit to Supine with contact guard assistance   · Performed unsupported sitting EOB for 5 minutes with contact guard assistance    Functional Mobility/Transfers:  · Did not perform patient declined    Activities of Daily Living:  · Grooming: contact guard assistance to wash face sitting EOB.    Cognitive/Visual Perceptual:  Cognitive/Psychosocial Skills:     -       Oriented to: Person, Place, Time and Situation   -       Follows Commands/attention:Follows multistep  commands  -       Communication: clear/fluent  -       Memory: No Deficits noted  -       Safety awareness/insight to disability: unable to assess    -       Mood/Affect/Coping skills/emotional control: Guarded  Visual/Perceptual:      -Intact     Physical Exam:  Balance:    -       Sitting: Contact Guard  Upper Extremity Range of Motion:     -       Right Upper Extremity: WFL except DIP and PIP ROM limites secondary to arthritis.  -       Left Upper Extremity: WFL except DIP and PIP ROM limites secondary to arthritis.  Upper Extremity Strength:    -       Right Upper Extremity: 4/5  -       Left Upper Extremity: 4/5   Strength:    -       Right Upper Extremity: Fair  -       Left Upper Extremity: Fair  Fine Motor Coordination:    -       Intact    AMPAC 6 Click ADL:  AMPAC Total Score: 15    Treatment & Education:  Patient required encouragement to sit EOB. Reports right knee pain limiting standing activity this session.  Education:    Patient left supine with all lines intact, call button in reach and bed alarm on    GOALS:   Multidisciplinary Problems     Occupational Therapy Goals        Problem: Occupational Therapy  Goal    Goal Priority Disciplines Outcome Interventions   Occupational Therapy Goal     OT, PT/OT     Description:  Goals to be met by: discharge     Patient will increase functional independence with ADLs by performing:    UE Dressing with Supervision.  LE Dressing with Supervision.  Grooming while standing with Supervision.  Toileting from toilet with Supervision for hygiene and clothing management.   Toilet transfer to toilet with Supervision.                      History:     Past Medical History:   Diagnosis Date    Atrial fibrillation, controlled     Bullous pemphigoid     Colon cancer     Diabetes mellitus, type 2     Eczema     Hypertension     Hypothyroidism        Past Surgical History:   Procedure Laterality Date    APPENDECTOMY      BREAST SURGERY      lt breast bx    CHOLECYSTECTOMY      COLON SURGERY      EYE SURGERY         Time Tracking:     OT Date of Treatment:    OT Start Time: 1148  OT Stop Time: 1202  OT Total Time (min): 14 min    Billable Minutes:Evaluation 14    Raimundo Hope OT  1/3/2020

## 2020-01-04 LAB
ALBUMIN SERPL BCP-MCNC: 3.1 G/DL (ref 3.5–5.2)
ALP SERPL-CCNC: 36 U/L (ref 55–135)
ALT SERPL W/O P-5'-P-CCNC: 15 U/L (ref 10–44)
ANION GAP SERPL CALC-SCNC: 10 MMOL/L (ref 8–16)
AST SERPL-CCNC: 18 U/L (ref 10–40)
BACTERIA BLD CULT: NORMAL
BACTERIA BLD CULT: NORMAL
BASOPHILS # BLD AUTO: 0.04 K/UL (ref 0–0.2)
BASOPHILS NFR BLD: 0.5 % (ref 0–1.9)
BILIRUB SERPL-MCNC: 2.3 MG/DL (ref 0.1–1)
BUN SERPL-MCNC: 14 MG/DL (ref 8–23)
CALCIUM SERPL-MCNC: 9.1 MG/DL (ref 8.7–10.5)
CHLORIDE SERPL-SCNC: 95 MMOL/L (ref 95–110)
CO2 SERPL-SCNC: 30 MMOL/L (ref 23–29)
CREAT SERPL-MCNC: 0.6 MG/DL (ref 0.5–1.4)
DIFFERENTIAL METHOD: ABNORMAL
EOSINOPHIL # BLD AUTO: 0.1 K/UL (ref 0–0.5)
EOSINOPHIL NFR BLD: 0.7 % (ref 0–8)
ERYTHROCYTE [DISTWIDTH] IN BLOOD BY AUTOMATED COUNT: 13.8 % (ref 11.5–14.5)
EST. GFR  (AFRICAN AMERICAN): >60 ML/MIN/1.73 M^2
EST. GFR  (NON AFRICAN AMERICAN): >60 ML/MIN/1.73 M^2
GLUCOSE SERPL-MCNC: 124 MG/DL (ref 70–110)
GLUCOSE SERPL-MCNC: 129 MG/DL (ref 70–110)
GLUCOSE SERPL-MCNC: 170 MG/DL (ref 70–110)
GLUCOSE SERPL-MCNC: 84 MG/DL (ref 70–110)
GLUCOSE SERPL-MCNC: 87 MG/DL (ref 70–110)
HCT VFR BLD AUTO: 41.8 % (ref 37–48.5)
HGB BLD-MCNC: 13.4 G/DL (ref 12–16)
IMM GRANULOCYTES # BLD AUTO: 0.03 K/UL (ref 0–0.04)
IMM GRANULOCYTES NFR BLD AUTO: 0.3 % (ref 0–0.5)
LYMPHOCYTES # BLD AUTO: 1.5 K/UL (ref 1–4.8)
LYMPHOCYTES NFR BLD: 17.4 % (ref 18–48)
MCH RBC QN AUTO: 28.4 PG (ref 27–31)
MCHC RBC AUTO-ENTMCNC: 32.1 G/DL (ref 32–36)
MCV RBC AUTO: 89 FL (ref 82–98)
MONOCYTES # BLD AUTO: 1.2 K/UL (ref 0.3–1)
MONOCYTES NFR BLD: 13.3 % (ref 4–15)
NEUTROPHILS # BLD AUTO: 6 K/UL (ref 1.8–7.7)
NEUTROPHILS NFR BLD: 67.8 % (ref 38–73)
NRBC BLD-RTO: 0 /100 WBC
PLATELET # BLD AUTO: 196 K/UL (ref 150–350)
PMV BLD AUTO: 10.1 FL (ref 9.2–12.9)
POTASSIUM SERPL-SCNC: 4.4 MMOL/L (ref 3.5–5.1)
PROT SERPL-MCNC: 6.5 G/DL (ref 6–8.4)
RBC # BLD AUTO: 4.72 M/UL (ref 4–5.4)
SODIUM SERPL-SCNC: 135 MMOL/L (ref 136–145)
WBC # BLD AUTO: 8.81 K/UL (ref 3.9–12.7)

## 2020-01-04 PROCEDURE — 25000003 PHARM REV CODE 250: Performed by: FAMILY MEDICINE

## 2020-01-04 PROCEDURE — 80053 COMPREHEN METABOLIC PANEL: CPT

## 2020-01-04 PROCEDURE — 97530 THERAPEUTIC ACTIVITIES: CPT | Mod: CQ

## 2020-01-04 PROCEDURE — 94761 N-INVAS EAR/PLS OXIMETRY MLT: CPT

## 2020-01-04 PROCEDURE — 36415 COLL VENOUS BLD VENIPUNCTURE: CPT

## 2020-01-04 PROCEDURE — 21000000 HC CCU ICU ROOM CHARGE

## 2020-01-04 PROCEDURE — 25000003 PHARM REV CODE 250: Performed by: SPECIALIST

## 2020-01-04 PROCEDURE — 82962 GLUCOSE BLOOD TEST: CPT

## 2020-01-04 PROCEDURE — 85025 COMPLETE CBC W/AUTO DIFF WBC: CPT

## 2020-01-04 PROCEDURE — 25000003 PHARM REV CODE 250: Performed by: STUDENT IN AN ORGANIZED HEALTH CARE EDUCATION/TRAINING PROGRAM

## 2020-01-04 PROCEDURE — 97116 GAIT TRAINING THERAPY: CPT

## 2020-01-04 RX ORDER — FUROSEMIDE 20 MG/1
20 TABLET ORAL DAILY
Qty: 30 TABLET | Refills: 11 | Status: SHIPPED | OUTPATIENT
Start: 2020-01-05 | End: 2021-08-11

## 2020-01-04 RX ORDER — DIGOXIN 125 MCG
0.12 TABLET ORAL DAILY
Qty: 30 TABLET | Refills: 11 | Status: SHIPPED | OUTPATIENT
Start: 2020-01-05 | End: 2020-02-17

## 2020-01-04 RX ORDER — DIGOXIN 125 MCG
0.12 TABLET ORAL DAILY
Status: DISCONTINUED | OUTPATIENT
Start: 2020-01-05 | End: 2020-01-05 | Stop reason: HOSPADM

## 2020-01-04 RX ORDER — METOPROLOL TARTRATE 100 MG/1
100 TABLET ORAL 2 TIMES DAILY
Qty: 60 TABLET | Refills: 11 | Status: SHIPPED | OUTPATIENT
Start: 2020-01-04 | End: 2021-08-11

## 2020-01-04 RX ADMIN — DOCUSATE CALCIUM 240 MG: 240 CAPSULE, LIQUID FILLED ORAL at 09:01

## 2020-01-04 RX ADMIN — RIVAROXABAN 15 MG: 15 TABLET, FILM COATED ORAL at 05:01

## 2020-01-04 RX ADMIN — POLYETHYLENE GLYCOL 3350 17 G: 17 POWDER, FOR SOLUTION ORAL at 09:01

## 2020-01-04 RX ADMIN — DIGOXIN 0.25 MG: 250 TABLET ORAL at 09:01

## 2020-01-04 RX ADMIN — POLYETHYLENE GLYCOL 3350 17 G: 17 POWDER, FOR SOLUTION ORAL at 08:01

## 2020-01-04 RX ADMIN — PANTOPRAZOLE SODIUM 40 MG: 40 TABLET, DELAYED RELEASE ORAL at 09:01

## 2020-01-04 RX ADMIN — METOPROLOL TARTRATE 100 MG: 50 TABLET, FILM COATED ORAL at 09:01

## 2020-01-04 RX ADMIN — METOPROLOL TARTRATE 100 MG: 50 TABLET, FILM COATED ORAL at 08:01

## 2020-01-04 RX ADMIN — ACETAMINOPHEN 650 MG: 325 TABLET ORAL at 08:01

## 2020-01-04 RX ADMIN — FUROSEMIDE 20 MG: 20 TABLET ORAL at 09:01

## 2020-01-04 NOTE — PLAN OF CARE
Problem: Physical Therapy Goal  Goal: Physical Therapy Goal  Description  Goals to be met by: 2020     Patient will increase functional independence with mobility by performin. Supine to sit with Stand-by Assistance  2. Sit to stand transfer with Supervision  3. Bed to chair transfer with Stand-by Assistance using Rolling Walker  4. Gait  x 100 feet with Minimal Assistance using Rolling Walker.      Outcome: Ongoing, Progressing   Pt progressing toward meeting goals . Pt limited 2/2 R knee pain

## 2020-01-04 NOTE — PROGRESS NOTES
Count includes the [facility] Medicine  Progress Note    Patient Name: Jennifer Alarcon  MRN: [id number]  Patient Class: IP- Inpatient   Admission Date: 12/29/2019  Length of Stay: 5 days  Attending Physician: Nely Bernabe DO  Primary Care Provider: Gustavo Adams MD    Subjective:     Principal Problem:Paroxysmal atrial fibrillation  Chief Complaint   Patient presents with    Shortness of Breath     X FEW DAYS    Cough    Palpitations     Interval History: afib rate controlled on tele. Worked with Pt/ot today. Patient seen and examined. Reports bilateral knee pain (states this is chronic, off/on and that she had knee surgery in the past).   Denies any CP, SOB, N/V/D, headaches, fever or chills.       Review of Systems   Per interval history, all other systems reviewed and negative.     Objective:     Vital Signs (Most Recent):  Temp: 98.3 °F (36.8 °C) (01/04/20 1130)  Pulse: 82 (01/04/20 1130)  Resp: (!) 39 (01/04/20 1130)  BP: 108/60 (01/04/20 1130)  SpO2: (!) 93 % (01/04/20 1130) Vital Signs (24h Range):  Temp:  [97.6 °F (36.4 °C)-98.8 °F (37.1 °C)] 98.3 °F (36.8 °C)  Pulse:  [76-85] 82  Resp:  [20-39] 39  SpO2:  [92 %-94 %] 93 %  BP: ()/(52-60) 108/60     Weight: 57 kg (125 lb 10.6 oz)  Body mass index is 20.91 kg/m².    Intake/Output Summary (Last 24 hours) at 1/4/2020 1509  Last data filed at 1/3/2020 1600  Gross per 24 hour   Intake --   Output 200 ml   Net -200 ml      Physical Exam   Constitutional: She is oriented to person, place, and time. She appears well-developed and well-nourished.   HENT:   Head: Atraumatic.   Eyes: Pupils are equal, round, and reactive to light. Conjunctivae and EOM are normal.   Neck: Normal range of motion. Neck supple. No JVD present. No thyromegaly present.   Cardiovascular: Normal rate. Exam reveals no gallop and no friction rub.   Murmur heard.  IRR     Pulmonary/Chest: Effort normal and breath sounds normal. She has no wheezes. She has no rales.    Abdominal: Soft. Bowel sounds are normal. She exhibits no distension. There is no tenderness.   Musculoskeletal: Normal range of motion. She exhibits no edema.   Neurological: She is alert and oriented to person, place, and time. She has normal reflexes.   Skin: Skin is warm and dry.   Psychiatric: She has a normal mood and affect. Her behavior is normal.   Nursing note and vitals reviewed.      Significant Labs:   CBC:   Recent Labs   Lab 01/03/20 0315 01/04/20  0459   WBC 7.56 8.81   HGB 14.4 13.4   HCT 44.3 41.8    196     CMP:   Recent Labs   Lab 01/03/20 0315 01/04/20  0459    135*   K 4.0 4.4   CL 99 95   CO2 28 30*    87   BUN 13 14   CREATININE 0.6 0.6   CALCIUM 9.1 9.1   PROT 6.7 6.5   ALBUMIN 3.3* 3.1*   BILITOT 2.0* 2.3*   ALKPHOS 40* 36*   AST 19 18   ALT 17 15   ANIONGAP 10 10   EGFRNONAA >60.0 >60.0     All pertinent labs within the past 24 hours have been reviewed.    Significant Imaging: no new images      Assessment/Plan:    Paroxysmal A-fib with RVR  - HR improved  - cardiology consulted  - medications: xarelto, metoprolol 100 bid and digoxin 0.125  - ECHO completed: EF 40%, MR     Constipation- improved  - stool softeners ordered  - OOB  - Ok to use bathroom instead of BSC with assistance     Possible LLL PNA  - 1/2/20 cxr showing improvement of LLL pleural effusion. AFVSS, procal negative  - will dc abx at this time.        Acute on Chronic CHF  Pleural Effusions  - BNP 1185 on admission and improved to 484   - ECHO with EF 40%  - oral Lasix 20 mg  - UOP 4.475 L since admission  - strict in/out, daily wts, low salt diet        Hypothyroidism  - TSH wnl  - not on medications  - stable     Non- Insulin dependent Diabetes  - Last A1c 6.8 on 12/30/19  - POCT glucose QACHS      Chronic bilateral knee pain  - likely OA  - pt/ot recommending snf  - PT declining snf at this time will order HH PT/OT  VTE Risk Mitigation (From admission, onward)         Ordered     rivaroxaban  tablet 15 mg  With dinner      12/29/19 2053     Place STACIE hose  Until discontinued      12/29/19 2053     IP VTE LOW RISK PATIENT  Once      12/29/19 2053                  Nely Bernabe DO  Department of Hospital Medicine   [facility]

## 2020-01-04 NOTE — PT/OT/SLP PROGRESS
Physical Therapy Treatment    Patient Name:  Jennifer Alarcon   MRN:  [id number]    Recommendations:     Discharge Recommendations:  nursing facility, skilled   Discharge Equipment Recommendations: walker, rolling   Barriers to discharge: Decreased caregiver support    Assessment:     Jennifer Alarcon is a 87 y.o. female admitted with a medical diagnosis of Paroxysmal atrial fibrillation.  She presents with the following impairments/functional limitations:  weakness, impaired endurance, impaired self care skills, impaired functional mobilty, pain, decreased upper extremity function, decreased lower extremity function, impaired cardiopulmonary response to activity. Pt educated in walker management to ensure safety during functional mobility. Pt with reports of less R knee pain during ambulation today. Pt ambulates with decreased estrella 2/2 decreased step length of BLE as well as decreased stance on RLE. Pt completed gait training on RA noting sats of 97% and HR maintained in the 90s. Pt with increase in HR once seated to 120 noting HR decreased to 80s once sitting for ~2 minutes. RN notified of tx.  Continue with PT and POC.     Rehab Prognosis: Good; patient would benefit from acute skilled PT services to address these deficits and reach maximum level of function.    Recent Surgery: * No surgery found *      Plan:     During this hospitalization, patient to be seen daily to address the identified rehab impairments via gait training, therapeutic activities, therapeutic exercises and progress toward the following goals:    · Plan of Care Expires:  02/03/20    Subjective     Chief Complaint: Pt reports improved R knee pain with ambulation  Patient/Family Comments/goals: return home   Pain/Comfort:  · Pain Rating 1: 4/10  · Location - Side 1: Right  · Location 1: knee  · Pain Addressed 1: Reposition, Distraction  · Pain Rating Post-Intervention 1: 3/10      Objective:     Communicated with RN prior to session.  Patient  found HOB elevated with bed alarm, telemetry, pulse ox (continuous), peripheral IV and MD present upon PT entry to room.     General Precautions: Standard, fall   Orthopedic Precautions:N/A   Braces:       Functional Mobility:  · Bed Mobility:     · Supine to Sit: stand by assistance  · Transfers:     · Sit to Stand:  contact guard assistance with rolling walker and 3 trials  · Bed to Chair: contact guard assistance with  rolling walker  using  Step Transfer  · Toilet Transfer: contact guard assistance with  rolling walker  using  Step Transfer  · Gait: 35 feet with RW and CGA without requiring any rest breaks.      AM-PAC 6 CLICK MOBILITY          Therapeutic Activities and Exercises:   bed mobility; sitting EOB for trunk control and midline orientation; sit<> stands; transfer training; gait training     Patient left up in chair with all lines intact, call button in reach and RN notified..    GOALS:   Multidisciplinary Problems     Physical Therapy Goals        Problem: Physical Therapy Goal    Goal Priority Disciplines Outcome Goal Variances Interventions   Physical Therapy Goal     PT, PT/OT Ongoing, Progressing     Description:  Goals to be met by: 2020     Patient will increase functional independence with mobility by performin. Supine to sit with Stand-by Assistance  2. Sit to stand transfer with Supervision  3. Bed to chair transfer with Stand-by Assistance using Rolling Walker  4. Gait  x 100 feet with Minimal Assistance using Rolling Walker.                       Time Tracking:     PT Received On: 20  PT Start Time: 1056     PT Stop Time: 1134  PT Total Time (min): 38 min     Billable Minutes: Gait Training 15 and Therapeutic Activity 23    Treatment Type: Treatment  PT/PTA: PTA     PTA Visit Number: Terri     Miranda Hammant, PTA  2020

## 2020-01-04 NOTE — PROGRESS NOTES
Progress Note  Cardiology    Admit Date: 12/29/2019   LOS: 5 days     Follow-up For:  Atrial fib with RVR; CHF    Scheduled Meds:   docusate calcium  240 mg Oral Daily    furosemide  20 mg Oral Daily    metoprolol tartrate  100 mg Oral BID    pantoprazole  40 mg Oral Daily    polyethylene glycol  17 g Oral BID    rivaroxaban  15 mg Oral Daily with dinner     Continuous Infusions:   dilTIAZem Stopped (12/31/19 1906)     PRN Meds:acetaminophen, bisacodyl, calcium chloride IVPB, calcium chloride IVPB, calcium chloride IVPB, dextrose 50%, dextrose 50%, glucagon (human recombinant), glucose, glucose, insulin aspart U-100, magnesium oxide, magnesium sulfate IVPB, magnesium sulfate IVPB, magnesium sulfate IVPB, magnesium sulfate IVPB, ondansetron, potassium chloride in water, potassium chloride in water, potassium chloride in water, potassium chloride in water, potassium chloride, potassium chloride, potassium chloride, potassium chloride, sodium chloride 0.9%, sodium phosphate IVPB, sodium phosphate IVPB, sodium phosphate IVPB, sodium phosphate IVPB, sodium phosphate IVPB, traMADol    Review of patient's allergies indicates:   Allergen Reactions    Cortisone      abd pain    Nitrofurantoin monohyd/m-cryst      leg cramps       SUBJECTIVE:     Interval History: Patient has no complaint of chest pain or shortness of breath.  She denies palpitation.  No dizzy or lightheaded spell.    Review of Systems  Respiratory: negative for cough, hemoptysis, sputum and wheezing  Cardiovascular: negative for chest pressure/discomfort, palpitations and paroxysmal nocturnal dyspnea    OBJECTIVE:     Vital Signs (Most Recent)  Temp: 98.3 °F (36.8 °C) (01/04/20 0720)  Pulse: 82 (01/04/20 0720)  Resp: (!) 39 (01/04/20 0720)  BP: (!) 107/54 (01/04/20 0720)  SpO2: (!) 94 % (01/04/20 0720)    Vital Signs Range (Last 24H):  Temp:  [97.6 °F (36.4 °C)-98.8 °F (37.1 °C)]   Pulse:  []   Resp:  [20-39]   BP: ()/(52-95)   SpO2:   [92 %-94 %]       Physical Exam:  Neck: no carotid bruit, no JVD and supple, symmetrical, trachea midline  Lungs: clear to auscultation bilaterally, normal respiratory effort  Heart: irregularly irregular rhythm  Abdomen: soft, non-tender; bowel sounds normal; no masses,  no organomegaly  Extremities: Extremities normal, atraumatic, no cyanosis, clubbing, or edema    Recent Results (from the past 24 hour(s))   POCT glucose    Collection Time: 01/03/20 11:15 AM   Result Value Ref Range    POC Glucose 169 (H) 70 - 110   POCT glucose    Collection Time: 01/03/20  4:15 PM   Result Value Ref Range    POC Glucose 146 (H) 70 - 110   POCT glucose    Collection Time: 01/03/20  8:51 PM   Result Value Ref Range    POC Glucose 168 (H) 70 - 110   CBC auto differential    Collection Time: 01/04/20  4:59 AM   Result Value Ref Range    WBC 8.81 3.90 - 12.70 K/uL    RBC 4.72 4.00 - 5.40 M/uL    Hemoglobin 13.4 12.0 - 16.0 g/dL    Hematocrit 41.8 37.0 - 48.5 %    Mean Corpuscular Volume 89 82 - 98 fL    Mean Corpuscular Hemoglobin 28.4 27.0 - 31.0 pg    Mean Corpuscular Hemoglobin Conc 32.1 32.0 - 36.0 g/dL    RDW 13.8 11.5 - 14.5 %    Platelets 196 150 - 350 K/uL    MPV 10.1 9.2 - 12.9 fL    Immature Granulocytes 0.3 0.0 - 0.5 %    Gran # (ANC) 6.0 1.8 - 7.7 K/uL    Immature Grans (Abs) 0.03 0.00 - 0.04 K/uL    Lymph # 1.5 1.0 - 4.8 K/uL    Mono # 1.2 (H) 0.3 - 1.0 K/uL    Eos # 0.1 0.0 - 0.5 K/uL    Baso # 0.04 0.00 - 0.20 K/uL    nRBC 0 0 /100 WBC    Gran% 67.8 38.0 - 73.0 %    Lymph% 17.4 (L) 18.0 - 48.0 %    Mono% 13.3 4.0 - 15.0 %    Eosinophil% 0.7 0.0 - 8.0 %    Basophil% 0.5 0.0 - 1.9 %    Differential Method Automated    Comprehensive metabolic panel    Collection Time: 01/04/20  4:59 AM   Result Value Ref Range    Sodium 135 (L) 136 - 145 mmol/L    Potassium 4.4 3.5 - 5.1 mmol/L    Chloride 95 95 - 110 mmol/L    CO2 30 (H) 23 - 29 mmol/L    Glucose 87 70 - 110 mg/dL    BUN, Bld 14 8 - 23 mg/dL    Creatinine 0.6 0.5 - 1.4  mg/dL    Calcium 9.1 8.7 - 10.5 mg/dL    Total Protein 6.5 6.0 - 8.4 g/dL    Albumin 3.1 (L) 3.5 - 5.2 g/dL    Total Bilirubin 2.3 (H) 0.1 - 1.0 mg/dL    Alkaline Phosphatase 36 (L) 55 - 135 U/L    AST 18 10 - 40 U/L    ALT 15 10 - 44 U/L    Anion Gap 10 8 - 16 mmol/L    eGFR if African American >60.0 >60 mL/min/1.73 m^2    eGFR if non African American >60.0 >60 mL/min/1.73 m^2   POCT glucose    Collection Time: 01/04/20  5:46 AM   Result Value Ref Range    POC Glucose 84 70 - 110       Diagnostic Results:  Labs: Reviewed  ECG: Reviewed    ASSESSMENT/PLAN:     The patient is on metoprolol 100 mg b.i.d..  She also received digoxin 0.25 mg p.o. this morning.  Ventricular rate appears to be well controlled; discontinue digoxin and resume at 0.125 mg ventricular rate increases.  Please call if can be of further assistance in her management.

## 2020-01-04 NOTE — PLAN OF CARE
Problem: Adult Inpatient Plan of Care  Goal: Plan of Care Review  Outcome: Ongoing, Progressing     Problem: Adult Inpatient Plan of Care  Goal: Optimal Comfort and Wellbeing  Outcome: Ongoing, Progressing     Problem: Fall Injury Risk  Goal: Absence of Fall and Fall-Related Injury  Outcome: Ongoing, Progressing

## 2020-01-04 NOTE — SUBJECTIVE & OBJECTIVE
Interval History: afib rate controlled on tele. Worked with Pt/ot today. Patient seen and examined. Reports bilateral knee pain (states this is chronic, off/on and that she had knee surgery in the past).   Denies any CP, SOB, N/V/D, headaches, fever or chills.       Review of Systems   Per interval history, all other systems reviewed and negative.     Objective:     Vital Signs (Most Recent):  Temp: 98.3 °F (36.8 °C) (01/04/20 1130)  Pulse: 82 (01/04/20 1130)  Resp: (!) 39 (01/04/20 1130)  BP: 108/60 (01/04/20 1130)  SpO2: (!) 93 % (01/04/20 1130) Vital Signs (24h Range):  Temp:  [97.6 °F (36.4 °C)-98.8 °F (37.1 °C)] 98.3 °F (36.8 °C)  Pulse:  [76-85] 82  Resp:  [20-39] 39  SpO2:  [92 %-94 %] 93 %  BP: ()/(52-60) 108/60     Weight: 57 kg (125 lb 10.6 oz)  Body mass index is 20.91 kg/m².    Intake/Output Summary (Last 24 hours) at 1/4/2020 1509  Last data filed at 1/3/2020 1600  Gross per 24 hour   Intake --   Output 200 ml   Net -200 ml      Physical Exam   Constitutional: She is oriented to person, place, and time. She appears well-developed and well-nourished.   HENT:   Head: Atraumatic.   Eyes: Pupils are equal, round, and reactive to light. Conjunctivae and EOM are normal.   Neck: Normal range of motion. Neck supple. No JVD present. No thyromegaly present.   Cardiovascular: Normal rate. Exam reveals no gallop and no friction rub.   Murmur heard.  IRR     Pulmonary/Chest: Effort normal and breath sounds normal. She has no wheezes. She has no rales.   Abdominal: Soft. Bowel sounds are normal. She exhibits no distension. There is no tenderness.   Musculoskeletal: Normal range of motion. She exhibits no edema.   Neurological: She is alert and oriented to person, place, and time. She has normal reflexes.   Skin: Skin is warm and dry.   Psychiatric: She has a normal mood and affect. Her behavior is normal.   Nursing note and vitals reviewed.      Significant Labs:   CBC:   Recent Labs   Lab 01/03/20  0315  01/04/20  0459   WBC 7.56 8.81   HGB 14.4 13.4   HCT 44.3 41.8    196     CMP:   Recent Labs   Lab 01/03/20  0315 01/04/20 0459    135*   K 4.0 4.4   CL 99 95   CO2 28 30*    87   BUN 13 14   CREATININE 0.6 0.6   CALCIUM 9.1 9.1   PROT 6.7 6.5   ALBUMIN 3.3* 3.1*   BILITOT 2.0* 2.3*   ALKPHOS 40* 36*   AST 19 18   ALT 17 15   ANIONGAP 10 10   EGFRNONAA >60.0 >60.0     All pertinent labs within the past 24 hours have been reviewed.    Significant Imaging: no new images

## 2020-01-05 VITALS
WEIGHT: 116.19 LBS | HEIGHT: 65 IN | SYSTOLIC BLOOD PRESSURE: 120 MMHG | HEART RATE: 70 BPM | TEMPERATURE: 98 F | OXYGEN SATURATION: 95 % | RESPIRATION RATE: 18 BRPM | BODY MASS INDEX: 19.36 KG/M2 | DIASTOLIC BLOOD PRESSURE: 58 MMHG

## 2020-01-05 PROBLEM — I50.9 ACUTE ON CHRONIC CONGESTIVE HEART FAILURE: Status: RESOLVED | Noted: 2019-12-30 | Resolved: 2020-01-05

## 2020-01-05 PROBLEM — E03.9 HYPOTHYROIDISM: Status: RESOLVED | Noted: 2018-05-02 | Resolved: 2020-01-05

## 2020-01-05 PROBLEM — R06.02 SOB (SHORTNESS OF BREATH): Status: RESOLVED | Noted: 2019-12-29 | Resolved: 2020-01-05

## 2020-01-05 LAB
ALBUMIN SERPL BCP-MCNC: 3.2 G/DL (ref 3.5–5.2)
ALP SERPL-CCNC: 39 U/L (ref 55–135)
ALT SERPL W/O P-5'-P-CCNC: 17 U/L (ref 10–44)
ANION GAP SERPL CALC-SCNC: 9 MMOL/L (ref 8–16)
AST SERPL-CCNC: 20 U/L (ref 10–40)
BASOPHILS # BLD AUTO: 0.03 K/UL (ref 0–0.2)
BASOPHILS NFR BLD: 0.5 % (ref 0–1.9)
BILIRUB SERPL-MCNC: 1.6 MG/DL (ref 0.1–1)
BUN SERPL-MCNC: 11 MG/DL (ref 8–23)
CALCIUM SERPL-MCNC: 9 MG/DL (ref 8.7–10.5)
CHLORIDE SERPL-SCNC: 95 MMOL/L (ref 95–110)
CO2 SERPL-SCNC: 31 MMOL/L (ref 23–29)
CREAT SERPL-MCNC: 0.4 MG/DL (ref 0.5–1.4)
DIFFERENTIAL METHOD: NORMAL
EOSINOPHIL # BLD AUTO: 0.2 K/UL (ref 0–0.5)
EOSINOPHIL NFR BLD: 3 % (ref 0–8)
ERYTHROCYTE [DISTWIDTH] IN BLOOD BY AUTOMATED COUNT: 13.7 % (ref 11.5–14.5)
EST. GFR  (AFRICAN AMERICAN): >60 ML/MIN/1.73 M^2
EST. GFR  (NON AFRICAN AMERICAN): >60 ML/MIN/1.73 M^2
GLUCOSE SERPL-MCNC: 86 MG/DL (ref 70–110)
GLUCOSE SERPL-MCNC: 98 MG/DL (ref 70–110)
HCT VFR BLD AUTO: 41.1 % (ref 37–48.5)
HGB BLD-MCNC: 13.2 G/DL (ref 12–16)
IMM GRANULOCYTES # BLD AUTO: 0.01 K/UL (ref 0–0.04)
IMM GRANULOCYTES NFR BLD AUTO: 0.2 % (ref 0–0.5)
LYMPHOCYTES # BLD AUTO: 1.7 K/UL (ref 1–4.8)
LYMPHOCYTES NFR BLD: 26.6 % (ref 18–48)
MCH RBC QN AUTO: 28.5 PG (ref 27–31)
MCHC RBC AUTO-ENTMCNC: 32.1 G/DL (ref 32–36)
MCV RBC AUTO: 89 FL (ref 82–98)
MONOCYTES # BLD AUTO: 0.9 K/UL (ref 0.3–1)
MONOCYTES NFR BLD: 13.9 % (ref 4–15)
NEUTROPHILS # BLD AUTO: 3.5 K/UL (ref 1.8–7.7)
NEUTROPHILS NFR BLD: 55.8 % (ref 38–73)
NRBC BLD-RTO: 0 /100 WBC
PLATELET # BLD AUTO: 204 K/UL (ref 150–350)
PMV BLD AUTO: 9.9 FL (ref 9.2–12.9)
POTASSIUM SERPL-SCNC: 4.1 MMOL/L (ref 3.5–5.1)
PROT SERPL-MCNC: 6.3 G/DL (ref 6–8.4)
RBC # BLD AUTO: 4.63 M/UL (ref 4–5.4)
SODIUM SERPL-SCNC: 135 MMOL/L (ref 136–145)
WBC # BLD AUTO: 6.31 K/UL (ref 3.9–12.7)

## 2020-01-05 PROCEDURE — 36415 COLL VENOUS BLD VENIPUNCTURE: CPT

## 2020-01-05 PROCEDURE — 25000003 PHARM REV CODE 250: Performed by: SPECIALIST

## 2020-01-05 PROCEDURE — 25000003 PHARM REV CODE 250: Performed by: STUDENT IN AN ORGANIZED HEALTH CARE EDUCATION/TRAINING PROGRAM

## 2020-01-05 PROCEDURE — 80053 COMPREHEN METABOLIC PANEL: CPT

## 2020-01-05 PROCEDURE — 94761 N-INVAS EAR/PLS OXIMETRY MLT: CPT

## 2020-01-05 PROCEDURE — 85025 COMPLETE CBC W/AUTO DIFF WBC: CPT

## 2020-01-05 RX ADMIN — METOPROLOL TARTRATE 100 MG: 50 TABLET, FILM COATED ORAL at 08:01

## 2020-01-05 RX ADMIN — DOCUSATE CALCIUM 240 MG: 240 CAPSULE, LIQUID FILLED ORAL at 08:01

## 2020-01-05 RX ADMIN — DIGOXIN 0.12 MG: 125 TABLET ORAL at 08:01

## 2020-01-05 RX ADMIN — FUROSEMIDE 20 MG: 20 TABLET ORAL at 08:01

## 2020-01-05 RX ADMIN — POLYETHYLENE GLYCOL 3350 17 G: 17 POWDER, FOR SOLUTION ORAL at 08:01

## 2020-01-05 NOTE — NURSING
Reviewed dishcharge orders with PT and son. States understanding. Pt IV and tele removed. PT wheeled down with belongings to be driven home by son. PT had no complaints.

## 2020-01-05 NOTE — DISCHARGE SUMMARY
[facility] Medicine  Discharge Summary      Patient Name: Jennifer Alarcon  MRN: [id number]  Admission Date: 12/29/2019  Hospital Length of Stay: 6 days  Discharge Date and Time: 1/5/2020  9:57 AM  Attending Physician: Nely Bernabe DO   Discharging Provider: Nely Bernabe DO  Primary Care Provider: Gustavo Adams MD      HPI per JACKI Fall on 12/29/19  87-year-old  female presents emergency room with shortness of breath and fatigue. T he patient has a past medical history of atrial fibrillation, hypertension type 2 diabetes hyperlipidemia hypothyroidism and remote history of colon cancer.       The patient states for the past 2-3 weeks she has been having worsening shortness of breath and dyspnea with minimal exertion.  She denies associated chest pain, hematemesis hemoptysis lightheadedness dizziness or syncope.  The patient does endorse a nonproductive cough.  She also complaints of right lower extremity swelling and edema.    She describes her symptoms as moderate in severity with no exacerbating or alleviating factors    Hospital Course:   Patient was admitted for AFib with RVR and acute CHF exacerbation.  Patient was given IV metoprolol and IV diltiazem with improvement to rate control.  She will be discharged with increase of her home metoprolol to 100 b.i.d. and digoxin 0.125 mg q.day.  She was diuresed with IV Lasix for total of 4.7 L during admission she will continue with home oral Lasix 20 mg..  Echo was performed showing EF 40%, mild-to-moderate mitral regurg, mild pulmonic regurg, PA systolic pressure 32.  PT OT was consulted who recommended sniff placement.  However patient was adamant about returning home.  She will be discharged with home health PT OT and rolling walker.  She is recommended to follow up with her PCP as needed and with Cardiology in 1-2 weeks.  Patient was seen and examined on day of discharge hemodynamically stable and appropriate for discharge  "home.  BP (!) 120/58   Pulse 70   Temp 97.9 °F (36.6 °C)   Resp 18   Ht 5' 5" (1.651 m)   Wt 52.7 kg (116 lb 2.9 oz)   SpO2 95%   Breastfeeding? No   BMI 19.33 kg/m²   Gen: nad, resting comfortably in bed  CV: IRR rate controlled  Resp: CTA B/l  AB: +bs soft ntnd  Skin: dry, warm      Consults:   Consults (From admission, onward)        Status Ordering Provider     Inpatient consult to Cardiology  Once     Provider:  Arjun Noe MD    Acknowledged JOSELYN BOTELLO     Inpatient consult to   Once     Provider:  (Not yet assigned)    NIYAH Goff new Assessment & Plan notes have been filed under this hospital service since the last note was generated.  Service: Hospital Medicine    Final Active Diagnoses:    Diagnosis Date Noted POA    PRINCIPAL PROBLEM:  Paroxysmal atrial fibrillation with RVR [I48.0] 12/29/2019 Yes    A-fib [I48.91] 12/30/2019 Yes    Hypertension [I10] 05/02/2018 Yes    Diabetes mellitus, type 2 [E11.9] 12/27/2017 Yes      Problems Resolved During this Admission:    Diagnosis Date Noted Date Resolved POA    Acute on chronic congestive heart failure [I50.9] 12/30/2019 01/05/2020 Yes    SOB (shortness of breath) [R06.02] 12/29/2019 01/05/2020 Yes    Hypothyroidism [E03.9] 05/02/2018 01/05/2020 Yes       Discharged Condition: stable    Disposition:  home with home health    Follow Up:  Follow-up Information     Schedule an appointment as soon as possible for a visit with Gustavo Adams MD.    Specialty:  Family Medicine  Contact information:  [address] [facility] [address]  [phone]             iNno Quick MD. Schedule an appointment as soon as possible for a visit in 2 weeks.    Specialty:  Cardiology  Contact information:  [address]  [address]  [place] [address]  [phone]                 Patient Instructions:      WALKER FOR HOME USE     Order Specific Question Answer Comments   Type of Walker: Adult " "(5'4"-6'6")    With wheels? Yes    Height: 5' 5" (1.651 m)    Weight: 57 kg (125 lb 10.6 oz)    Length of need (1-99 months): 99    Does patient have medical equipment at home? cane, straight    Does patient have medical equipment at home? walker, rolling    Does patient have medical equipment at home? shower chair    Does patient have medical equipment at home? bedside commode    Please check all that apply: Patient's condition impairs ambulation.    Please check all that apply: Patient is unable to safely ambulate without equipment.      Ambulatory referral to Home Health   Referral Priority: Routine Referral Type: Home Health   Referral Reason: Specialty Services Required   Requested Specialty: Home Health Services   Number of Visits Requested: 1     Diet Cardiac     Notify your health care provider if you experience any of the following:  temperature >100.4     Notify your health care provider if you experience any of the following:  persistent nausea and vomiting or diarrhea     Notify your health care provider if you experience any of the following:  redness, tenderness, or signs of infection (pain, swelling, redness, odor or green/yellow discharge around incision site)     Notify your health care provider if you experience any of the following:  difficulty breathing or increased cough     Notify your health care provider if you experience any of the following:  persistent dizziness, light-headedness, or visual disturbances     Activity as tolerated       Significant Diagnostic Studies: Labs: All labs within the past 24 hours have been reviewed    Pending Diagnostic Studies:     None         Medications:  Reconciled Home Medications:      Medication List      START taking these medications    digoxin 125 mcg tablet  Commonly known as:  LANOXIN  Take 1 tablet (0.125 mg total) by mouth once daily.     furosemide 20 MG tablet  Commonly known as:  LASIX  Take 1 tablet (20 mg total) by mouth once daily.      "   CHANGE how you take these medications    metoprolol tartrate 100 MG tablet  Commonly known as:  LOPRESSOR  Take 1 tablet (100 mg total) by mouth 2 (two) times daily.  What changed:    · medication strength  · how much to take     rivaroxaban 20 mg Tab  Commonly known as:  Xarelto  Take 1 tablet (20 mg total) by mouth daily with dinner or evening meal.  What changed:  how much to take        CONTINUE taking these medications    mupirocin 2 % ointment  Commonly known as:  BACTROBAN  BLANCA A SMALL AMOUNT AA QHS     triamcinolone acetonide 0.1% 0.1 % paste  Commonly known as:  KENALOG  triamcinolone 0.1 % topical ointment and dimethicone 5 % topical cream            Indwelling Lines/Drains at time of discharge:   Lines/Drains/Airways     Pressure Ulcer                 Pressure Injury 12/30/19 1359 Left lower Buttocks #1 Stage 2 5 days         Pressure Injury 12/30/19 1401 Right Buttocks Stage 2 5 days                Time spent on the discharge of patient: 34 minutes  Patient was seen and examined on the date of discharge and determined to be suitable for discharge.         Nely Bernabe DO  Department of Hospital Medicine  [facility]

## 2020-01-06 NOTE — PLAN OF CARE
01/06/20 1223   Final Note   Assessment Type Final Discharge Note   Anticipated Discharge Disposition Home-Health   Patient dc'd on 01/05/2020 to home with HH orders and DME orders. No indication that orders were sent. I called the patient [phone] spoke with her, informed her of the HH order and her right to choose the HHA. She has no preference, I asked her if it was ok if I place her with [facility]. She stsed that this was ok with her; she does not know anything about the HHAs. Patient choice form completed per this conversation. Referral sent to [facility] via epicfax and DME order sent to [facility] [phone] and Rehab via rightfax. Spoke with Aure at [facility] [phone], informed her of the referral for DME that was faxed to them.  Called [facility] [phone], spoke with Jana, informed her of the referral that has been faxed to them.

## 2020-01-07 NOTE — PT/OT/SLP DISCHARGE
Occupational Therapy Discharge Summary    Jennifer Alarcon  MRN: [id number]   Principal Problem: Paroxysmal atrial fibrillation      Patient Discharged from acute Occupational Therapy on 1/5/2020.  Please refer to prior OT note dated 1/3/2020 for functional status.    Assessment:      Patient has not met goals.    Objective:     GOALS:   Multidisciplinary Problems     Occupational Therapy Goals     Not on file          Multidisciplinary Problems (Resolved)        Problem: Occupational Therapy Goal    Goal Priority Disciplines Outcome Interventions   Occupational Therapy Goal   (Resolved)     OT, PT/OT Met    Description:  Goals to be met by: discharge     Patient will increase functional independence with ADLs by performing:    UE Dressing with Supervision.  LE Dressing with Supervision.  Grooming while standing with Supervision.  Toileting from toilet with Supervision for hygiene and clothing management.   Toilet transfer to toilet with Supervision.                      Reasons for Discontinuation of Therapy Services  Patient d/c home.      Plan:     Patient Discharged to: Home with Home Health Service    Raimundo Hope, OT  1/7/2020

## 2020-01-09 ENCOUNTER — OFFICE VISIT (OUTPATIENT)
Dept: FAMILY MEDICINE | Facility: CLINIC | Age: 85
End: 2020-01-09
Payer: MEDICARE

## 2020-01-09 VITALS
HEIGHT: 65 IN | HEART RATE: 68 BPM | OXYGEN SATURATION: 98 % | DIASTOLIC BLOOD PRESSURE: 60 MMHG | BODY MASS INDEX: 19.16 KG/M2 | WEIGHT: 115 LBS | SYSTOLIC BLOOD PRESSURE: 120 MMHG

## 2020-01-09 DIAGNOSIS — I48.91 ATRIAL FIBRILLATION, CONTROLLED: ICD-10-CM

## 2020-01-09 DIAGNOSIS — E11.9 TYPE 2 DIABETES MELLITUS WITHOUT COMPLICATION, WITHOUT LONG-TERM CURRENT USE OF INSULIN: ICD-10-CM

## 2020-01-09 DIAGNOSIS — Z09 HOSPITAL DISCHARGE FOLLOW-UP: Primary | ICD-10-CM

## 2020-01-09 PROCEDURE — 99495 TRANSJ CARE MGMT MOD F2F 14D: CPT | Mod: S$GLB,,, | Performed by: NURSE PRACTITIONER

## 2020-01-09 PROCEDURE — 99495 TCM SERVICES (MODERATE COMPLEXITY): ICD-10-PCS | Mod: S$GLB,,, | Performed by: NURSE PRACTITIONER

## 2020-01-09 RX ORDER — BISACODYL 5 MG
5 TABLET, DELAYED RELEASE (ENTERIC COATED) ORAL DAILY PRN
COMMUNITY

## 2020-01-09 RX ORDER — SENNOSIDES 8.6 MG/1
1 TABLET ORAL 2 TIMES DAILY PRN
COMMUNITY
End: 2022-06-23

## 2020-01-09 NOTE — PROGRESS NOTES
SUBJECTIVE:    Patient ID: Jennifer Alarcon is a 87 y.o. female.    Chief Complaint: Hospital Follow Up (CHF)    Patient is here today with her daughter as a hospital f/u for CHF exacerbation and A fib with RVR. Her metoprolol was increased to 100 mg bid and she was started on digoxin. Her A1c was 6.8. She saw Dr Horan and Denise Coleman NP today for a f/u visit. Her digoxin was stopped and her lasix was decreased to every other day. She has labs ordered next week for Dr Horan       Admit Date: 12/29/19   Discharge Date: 1/5/20  Discharge Facility: Hospital    Medication Reconciliation:  Medications changed/added/deleted. Started on digoxin, metoprolol increased to 100mg bid  New Prescriptions filled after discharge: yes  Discharge summary reviewed:  yes  Pending test results at discharge reviewed:   yes  Follow up appointments scheduled:  yes   with Cardiology   Follow up labs/tests ordered:   yes  Home Health ordered on discharge:   yes  Home Health company name: [facility]  DME ordered at discharge:   no  How patient is feeling since discharge from the hospital?  improved         No results displayed because visit has over 200 results.          Past Medical History:   Diagnosis Date    Atrial fibrillation, controlled     Bullous pemphigoid     CHF (congestive heart failure)     Colon cancer     Diabetes mellitus, type 2     Eczema     Hypertension     Hypothyroidism      Past Surgical History:   Procedure Laterality Date    APPENDECTOMY      BREAST SURGERY      lt breast bx    CHOLECYSTECTOMY      COLON SURGERY      EYE SURGERY       Family History   Problem Relation Age of Onset    Diabetes Mother     Heart disease Mother     Heart disease Father     Diabetes Father        Marital Status:   Alcohol History:  reports that she does not drink alcohol.  Tobacco History:  reports that she has never smoked. She has never used smokeless tobacco.  Drug History:  reports that she  does not use drugs.    Review of patient's allergies indicates:   Allergen Reactions    Cortisone      abd pain    Nitrofurantoin monohyd/m-cryst      leg cramps       Current Outpatient Medications:     bisacodyl (DULCOLAX) 5 mg EC tablet, Take 5 mg by mouth daily as needed for Constipation., Disp: , Rfl:     furosemide (LASIX) 20 MG tablet, Take 1 tablet (20 mg total) by mouth once daily. (Patient taking differently: Take 20 mg by mouth every other day. ), Disp: 30 tablet, Rfl: 11    metoprolol tartrate (LOPRESSOR) 100 MG tablet, Take 1 tablet (100 mg total) by mouth 2 (two) times daily., Disp: 60 tablet, Rfl: 11    rivaroxaban (XARELTO) 20 mg Tab, Take 1 tablet (20 mg total) by mouth daily with dinner or evening meal., Disp: 30 tablet, Rfl: 0    senna (SENOKOT) 8.6 mg tablet, Take 1 tablet by mouth 2 (two) times daily., Disp: , Rfl:     triamcinolone acetonide 0.1% (KENALOG) 0.1 % paste, triamcinolone 0.1 % topical ointment and dimethicone 5 % topical cream, Disp: , Rfl:     digoxin (LANOXIN) 125 mcg tablet, Take 1 tablet (0.125 mg total) by mouth once daily. (Patient not taking: Reported on 1/9/2020), Disp: 30 tablet, Rfl: 11    Review of Systems   Constitutional: Negative for appetite change, chills, diaphoresis and unexpected weight change.   HENT: Negative for ear discharge, hearing loss, trouble swallowing and voice change.    Eyes: Negative for photophobia and pain.   Respiratory: Negative for chest tightness and stridor.    Cardiovascular: Negative for chest pain.   Gastrointestinal: Negative for blood in stool and vomiting.   Endocrine: Negative for cold intolerance and heat intolerance.   Genitourinary: Negative for difficulty urinating and flank pain.   Musculoskeletal: Negative for joint swelling and neck stiffness.   Skin: Negative for pallor.   Neurological: Negative for speech difficulty.   Hematological: Does not bruise/bleed easily.   Psychiatric/Behavioral: Negative for confusion.       "  Objective:      Vitals:    01/09/20 1256   BP: 120/60   Pulse: 68   SpO2: 98%   Weight: 52.2 kg (115 lb)   Height: 5' 5" (1.651 m)     Physical Exam   Constitutional: She is oriented to person, place, and time. She appears well-developed and well-nourished. No distress.   HENT:   Head: Atraumatic.   Nose: Nose normal.   Mouth/Throat: Oropharynx is clear and moist.   Eyes: Conjunctivae are normal.   Neck: Neck supple. Carotid bruit is not present.   Cardiovascular: Normal rate, normal heart sounds and intact distal pulses. An irregular rhythm present.   No murmur heard.  Pulmonary/Chest: Effort normal and breath sounds normal. No respiratory distress. She has no decreased breath sounds. She has no wheezes. She has no rhonchi. She has no rales.   Abdominal: Soft. Bowel sounds are normal. She exhibits no distension. There is no tenderness. There is no rebound and no guarding.   Musculoskeletal: Normal range of motion. She exhibits no edema.   Neurological: She is alert and oriented to person, place, and time. She displays no tremor. Gait (ambulation with walker) abnormal.   Skin: Skin is warm and dry.   Psychiatric: She has a normal mood and affect. Her speech is normal.   Nursing note and vitals reviewed.      Assessment:       1. Hospital discharge follow-up    2. Type 2 diabetes mellitus without complication, without long-term current use of insulin    3. Atrial fibrillation, controlled         Plan:       Hospital discharge follow-up  Hospital records reviewed  Medications reconciled    Type 2 diabetes mellitus without complication, without long-term current use of insulin  -     Lipid panel; Future; Expected date: 01/09/2020  -     Microalbumin/creatinine urine ratio; Future; Expected date: 01/09/2020    Atrial fibrillation, controlled  -     Lipid panel; Future; Expected date: 01/09/2020  F/u with Dr Horan as scheduled    Follow up if symptoms worsen or fail to improve.      "

## 2020-01-09 NOTE — PATIENT INSTRUCTIONS
4 Steps for Eating Healthier  Changing the way you eat can improve your health. It can lower your cholesterol and blood pressure, and help you stay at a healthy weight. Your diet doesnt have to be bland and boring to be healthy. Just watch your calories and follow these steps:    1. Eat fewer unhealthy fats  · Choose more fish and lean meats instead of fatty cuts of meat.  · Skip butter and lard, and use less margarine.  · Pass on foods that have palm, coconut, or hydrogenated oils.  · Eat fewer high-fat dairy foods like cheese, ice cream, and whole milk.  · Get a heart-healthy cookbook and try some low-fat recipes.  2. Go light on salt  · Keep the saltshaker off the table.  · Limit high-salt ingredients, such as soy sauce, bouillon, and garlic salt.  · Instead of adding salt when cooking, season your food with herbs and flavorings. Try lemon, garlic, and onion.  · Limit convenience foods, such as boxed or canned foods and restaurant food.  · Read food labels and choose lower-sodium options.  3. Limit sugar  · Pause before you add sugars to pancakes, cereal, coffee, or tea. This includes white and brown table sugar, syrup, honey, and molasses. Cut your usual amount by half.  · Use non-sugar sweeteners. Stevia, aspartame, and sucralose can satisfy a sweet tooth without adding calories.  · Swap out sugar-filled soda and other drinks. Buy sugar-free or low-calorie beverages. Remember water is always the best choice.  · Read labels and choose foods with less added sugar. Keep in mind that dairy foods and foods with fruit will have some natural sugar.  · Cut the sugar in recipes by 1/3 to 1/2. Boost the flavor with extracts like almond, vanilla, or orange. Or add spices such as cinnamon or nutmeg.  4. Eat more fiber  · Eat fresh fruits and vegetables every day.  · Boost your diet with whole grains. Go for oats, whole-grain rice, and bran.  · Add beans and lentils to your meals.  · Drink more water to match your fiber  increase. This is to help prevent constipation.  Date Last Reviewed: 5/11/2015  © 7498-8254 [employer]. [address] [place] [address], [place] [address]. All rights reserved. This information is not intended as a substitute for professional medical care. Always follow your healthcare professional's instructions.

## 2020-01-10 ENCOUNTER — LAB VISIT (OUTPATIENT)
Dept: LAB | Facility: HOSPITAL | Age: 85
End: 2020-01-10
Attending: SPECIALIST
Payer: MEDICARE

## 2020-01-10 DIAGNOSIS — G44.209 TENSION TYPE HEADACHE: Primary | ICD-10-CM

## 2020-01-10 PROCEDURE — 80053 COMPREHEN METABOLIC PANEL: CPT

## 2020-01-11 LAB
ALBUMIN SERPL BCP-MCNC: 3.5 G/DL (ref 3.5–5.2)
ALP SERPL-CCNC: 52 U/L (ref 55–135)
ALT SERPL W/O P-5'-P-CCNC: 20 U/L (ref 10–44)
ANION GAP SERPL CALC-SCNC: 17 MMOL/L (ref 8–16)
AST SERPL-CCNC: 33 U/L (ref 10–40)
BILIRUB SERPL-MCNC: 0.9 MG/DL (ref 0.1–1)
BUN SERPL-MCNC: 15 MG/DL (ref 8–23)
CALCIUM SERPL-MCNC: 9.8 MG/DL (ref 8.7–10.5)
CHLORIDE SERPL-SCNC: 100 MMOL/L (ref 95–110)
CO2 SERPL-SCNC: 21 MMOL/L (ref 23–29)
CREAT SERPL-MCNC: 0.7 MG/DL (ref 0.5–1.4)
EST. GFR  (AFRICAN AMERICAN): >60 ML/MIN/1.73 M^2
EST. GFR  (NON AFRICAN AMERICAN): >60 ML/MIN/1.73 M^2
GLUCOSE SERPL-MCNC: 85 MG/DL (ref 70–110)
POTASSIUM SERPL-SCNC: 4.4 MMOL/L (ref 3.5–5.1)
PROT SERPL-MCNC: 7.3 G/DL (ref 6–8.4)
SODIUM SERPL-SCNC: 138 MMOL/L (ref 136–145)

## 2020-01-13 ENCOUNTER — TELEPHONE (OUTPATIENT)
Dept: FAMILY MEDICINE | Facility: CLINIC | Age: 85
End: 2020-01-13

## 2020-01-13 NOTE — TELEPHONE ENCOUNTER
----- Message from Tyrel Gonzales Jr., MD sent at 1/13/2020  8:25 AM CST -----  I have reviewed your results.  They demonstrate no abnormal findings.  If you have any additional concerns regarding these tests, please contact me at your convenience.

## 2020-01-17 ENCOUNTER — EXTERNAL HOME HEALTH (OUTPATIENT)
Dept: HOME HEALTH SERVICES | Facility: HOSPITAL | Age: 85
End: 2020-01-17

## 2020-02-17 ENCOUNTER — OFFICE VISIT (OUTPATIENT)
Dept: FAMILY MEDICINE | Facility: CLINIC | Age: 85
End: 2020-02-17
Payer: MEDICARE

## 2020-02-17 VITALS
TEMPERATURE: 98 F | OXYGEN SATURATION: 97 % | HEART RATE: 96 BPM | BODY MASS INDEX: 19.16 KG/M2 | DIASTOLIC BLOOD PRESSURE: 60 MMHG | SYSTOLIC BLOOD PRESSURE: 102 MMHG | WEIGHT: 115 LBS | HEIGHT: 65 IN

## 2020-02-17 DIAGNOSIS — H61.22 IMPACTED CERUMEN OF LEFT EAR: Primary | ICD-10-CM

## 2020-02-17 PROCEDURE — 69209 EAR CERUMEN REMOVAL: ICD-10-PCS | Mod: S$GLB,ICN,, | Performed by: INTERNAL MEDICINE

## 2020-02-17 PROCEDURE — 69209 REMOVE IMPACTED EAR WAX UNI: CPT | Mod: S$GLB,ICN,, | Performed by: INTERNAL MEDICINE

## 2020-02-17 NOTE — PROGRESS NOTES
Subjective:       Patient ID: Jennifer Alarcon is a 87 y.o. female.    Chief Complaint: Ear Fullness    Ear Fullness    There is pain in both ears. This is a recurrent (has had to have ears flushed in past) problem. The current episode started in the past 7 days. The problem occurs constantly. There has been no fever. Associated symptoms include hearing loss. Pertinent negatives include no abdominal pain, coughing, diarrhea, headaches, neck pain, rash, rhinorrhea or vomiting.     Review of Systems   Constitutional: Negative for chills, fatigue, fever and unexpected weight change.   HENT: Positive for hearing loss. Negative for congestion, postnasal drip, rhinorrhea, trouble swallowing and voice change.    Eyes: Negative for photophobia and visual disturbance.   Respiratory: Negative for apnea, cough, choking, chest tightness, shortness of breath and wheezing.    Cardiovascular: Negative for chest pain, palpitations and leg swelling.   Gastrointestinal: Negative for abdominal pain, blood in stool, constipation, diarrhea, nausea, rectal pain and vomiting.   Endocrine: Negative for cold intolerance, heat intolerance, polydipsia and polyuria.   Genitourinary: Negative for decreased urine volume, difficulty urinating, dysuria, frequency, genital sores, hematuria, menstrual problem, pelvic pain, urgency, vaginal bleeding and vaginal discharge.   Musculoskeletal: Negative for arthralgias, back pain, gait problem, joint swelling, myalgias, neck pain and neck stiffness.   Skin: Negative for color change, rash and wound.   Allergic/Immunologic: Negative for environmental allergies and food allergies.   Neurological: Negative for dizziness, tremors, seizures, syncope, facial asymmetry, speech difficulty, weakness, light-headedness, numbness and headaches.   Hematological: Negative for adenopathy. Does not bruise/bleed easily.   Psychiatric/Behavioral: Negative for confusion, hallucinations, sleep disturbance and suicidal ideas.  The patient is nervous/anxious.        Past Medical History:   Diagnosis Date    Atrial fibrillation, controlled     Bullous pemphigoid     CHF (congestive heart failure)     Colon cancer     Diabetes mellitus, type 2     Eczema     Hypertension     Hypothyroidism       Past Surgical History:   Procedure Laterality Date    APPENDECTOMY      BREAST SURGERY      lt breast bx    CHOLECYSTECTOMY      COLON SURGERY      EYE SURGERY         Family History   Problem Relation Age of Onset    Diabetes Mother     Heart disease Mother     Heart disease Father     Diabetes Father        Social History     Socioeconomic History    Marital status:      Spouse name: Not on file    Number of children: Not on file    Years of education: Not on file    Highest education level: Not on file   Occupational History    Occupation: retired   Social Needs    Financial resource strain: Not on file    Food insecurity:     Worry: Not on file     Inability: Not on file    Transportation needs:     Medical: Not on file     Non-medical: Not on file   Tobacco Use    Smoking status: Never Smoker    Smokeless tobacco: Never Used   Substance and Sexual Activity    Alcohol use: No    Drug use: No    Sexual activity: Never   Lifestyle    Physical activity:     Days per week: Not on file     Minutes per session: Not on file    Stress: Only a little   Relationships    Social connections:     Talks on phone: Not on file     Gets together: Not on file     Attends [employer] service: Not on file     Active member of club or organization: Not on file     Attends meetings of clubs or organizations: Not on file     Relationship status: Not on file   Other Topics Concern    Not on file   Social History Narrative    Live alone       Current Outpatient Medications   Medication Sig Dispense Refill    bisacodyl (DULCOLAX) 5 mg EC tablet Take 5 mg by mouth daily as needed for Constipation.      furosemide (LASIX) 20 MG  "tablet Take 1 tablet (20 mg total) by mouth once daily. (Patient taking differently: Take 20 mg by mouth every other day. ) 30 tablet 11    metoprolol tartrate (LOPRESSOR) 100 MG tablet Take 1 tablet (100 mg total) by mouth 2 (two) times daily. 60 tablet 11    rivaroxaban (XARELTO) 20 mg Tab Take 1 tablet (20 mg total) by mouth daily with dinner or evening meal. 30 tablet 0    senna (SENOKOT) 8.6 mg tablet Take 1 tablet by mouth 2 (two) times daily.      triamcinolone acetonide 0.1% (KENALOG) 0.1 % paste triamcinolone 0.1 % topical ointment and dimethicone 5 % topical cream      digoxin (LANOXIN) 125 mcg tablet Take 1 tablet (0.125 mg total) by mouth once daily. (Patient not taking: Reported on 2/17/2020) 30 tablet 11     No current facility-administered medications for this visit.        Review of patient's allergies indicates:   Allergen Reactions    Cortisone      abd pain    Nitrofurantoin monohyd/m-cryst      leg cramps     Objective:      Pulse 96, temperature 97.7 °F (36.5 °C), temperature source Temporal, height 5' 5" (1.651 m), weight 52.2 kg (115 lb), SpO2 97 %. Body mass index is 19.14 kg/m².   Physical Exam   HENT:   Right Ear: Tympanic membrane, external ear and ear canal normal. Decreased hearing is noted.   Left Ear: External ear normal. Decreased hearing is noted.   Cerumen impaction on left   Nursing note and vitals reviewed.          Assessment:       No diagnosis found.    Plan:       There are no diagnoses linked to this encounter.     "

## 2020-02-17 NOTE — PROCEDURES
Ear Cerumen Removal  Date/Time: 2/17/2020 11:00 AM  Performed by: Tyrel Gonzales Jr., MD  Authorized by: Tyrel Gonzales Jr., MD     Consent Done?:  Yes (Verbal)    Local anesthetic:  None  Ceruminolytics applied: Ceruminolytics applied prior to the procedure    Medication Used:  Debrox  Location details:  Left ear  Procedure type: irrigation    Cerumen  Removal Results:  Cerumen completely removed  Patient tolerance:  Patient tolerated the procedure well with no immediate complications     Mild canal erythema/irritation.  Hearing improved

## 2020-04-15 ENCOUNTER — EXTERNAL HOME HEALTH (OUTPATIENT)
Dept: HOME HEALTH SERVICES | Facility: HOSPITAL | Age: 85
End: 2020-04-15

## 2020-05-06 ENCOUNTER — EXTERNAL HOME HEALTH (OUTPATIENT)
Dept: HOME HEALTH SERVICES | Facility: HOSPITAL | Age: 85
End: 2020-05-06

## 2021-01-15 ENCOUNTER — IMMUNIZATION (OUTPATIENT)
Dept: PRIMARY CARE CLINIC | Facility: CLINIC | Age: 86
End: 2021-01-15
Payer: MEDICARE

## 2021-01-15 DIAGNOSIS — Z23 NEED FOR VACCINATION: Primary | ICD-10-CM

## 2021-01-15 PROCEDURE — 0001A COVID-19, MRNA, LNP-S, PF, 30 MCG/0.3 ML DOSE VACCINE: CPT | Mod: S$GLB,,, | Performed by: FAMILY MEDICINE

## 2021-01-15 PROCEDURE — 91300 COVID-19, MRNA, LNP-S, PF, 30 MCG/0.3 ML DOSE VACCINE: ICD-10-PCS | Mod: S$GLB,,, | Performed by: FAMILY MEDICINE

## 2021-01-15 PROCEDURE — 91300 COVID-19, MRNA, LNP-S, PF, 30 MCG/0.3 ML DOSE VACCINE: CPT | Mod: S$GLB,,, | Performed by: FAMILY MEDICINE

## 2021-01-15 PROCEDURE — 0001A COVID-19, MRNA, LNP-S, PF, 30 MCG/0.3 ML DOSE VACCINE: ICD-10-PCS | Mod: S$GLB,,, | Performed by: FAMILY MEDICINE

## 2021-02-05 ENCOUNTER — IMMUNIZATION (OUTPATIENT)
Dept: PRIMARY CARE CLINIC | Facility: CLINIC | Age: 86
End: 2021-02-05
Payer: MEDICARE

## 2021-02-05 DIAGNOSIS — Z23 NEED FOR VACCINATION: Primary | ICD-10-CM

## 2021-02-05 PROCEDURE — 91300 COVID-19, MRNA, LNP-S, PF, 30 MCG/0.3 ML DOSE VACCINE: ICD-10-PCS | Mod: S$GLB,,, | Performed by: FAMILY MEDICINE

## 2021-02-05 PROCEDURE — 0002A COVID-19, MRNA, LNP-S, PF, 30 MCG/0.3 ML DOSE VACCINE: ICD-10-PCS | Mod: S$GLB,,, | Performed by: FAMILY MEDICINE

## 2021-02-05 PROCEDURE — 0002A COVID-19, MRNA, LNP-S, PF, 30 MCG/0.3 ML DOSE VACCINE: CPT | Mod: S$GLB,,, | Performed by: FAMILY MEDICINE

## 2021-02-05 PROCEDURE — 91300 COVID-19, MRNA, LNP-S, PF, 30 MCG/0.3 ML DOSE VACCINE: CPT | Mod: S$GLB,,, | Performed by: FAMILY MEDICINE

## 2021-03-03 ENCOUNTER — TELEPHONE (OUTPATIENT)
Dept: FAMILY MEDICINE | Facility: CLINIC | Age: 86
End: 2021-03-03

## 2021-03-09 ENCOUNTER — OFFICE VISIT (OUTPATIENT)
Dept: PODIATRY | Facility: CLINIC | Age: 86
End: 2021-03-09
Payer: MEDICARE

## 2021-03-09 VITALS
OXYGEN SATURATION: 99 % | WEIGHT: 114 LBS | BODY MASS INDEX: 18.99 KG/M2 | RESPIRATION RATE: 16 BRPM | HEART RATE: 81 BPM | HEIGHT: 65 IN

## 2021-03-09 DIAGNOSIS — E11.9 TYPE 2 DIABETES MELLITUS WITHOUT COMPLICATION, WITHOUT LONG-TERM CURRENT USE OF INSULIN: Primary | ICD-10-CM

## 2021-03-09 DIAGNOSIS — B35.1 ONYCHOMYCOSIS DUE TO DERMATOPHYTE: ICD-10-CM

## 2021-03-09 PROCEDURE — 99203 OFFICE O/P NEW LOW 30 MIN: CPT | Mod: S$GLB,,, | Performed by: PODIATRIST

## 2021-03-09 PROCEDURE — 99203 PR OFFICE/OUTPT VISIT, NEW, LEVL III, 30-44 MIN: ICD-10-PCS | Mod: S$GLB,,, | Performed by: PODIATRIST

## 2021-03-11 ENCOUNTER — OFFICE VISIT (OUTPATIENT)
Dept: FAMILY MEDICINE | Facility: CLINIC | Age: 86
End: 2021-03-11
Payer: MEDICARE

## 2021-03-11 VITALS
HEIGHT: 65 IN | OXYGEN SATURATION: 97 % | HEART RATE: 80 BPM | TEMPERATURE: 98 F | BODY MASS INDEX: 18.66 KG/M2 | WEIGHT: 112 LBS | SYSTOLIC BLOOD PRESSURE: 116 MMHG | DIASTOLIC BLOOD PRESSURE: 68 MMHG

## 2021-03-11 DIAGNOSIS — I48.91 ATRIAL FIBRILLATION, CONTROLLED: ICD-10-CM

## 2021-03-11 DIAGNOSIS — I10 ESSENTIAL HYPERTENSION: ICD-10-CM

## 2021-03-11 DIAGNOSIS — E03.4 HYPOTHYROIDISM DUE TO ACQUIRED ATROPHY OF THYROID: ICD-10-CM

## 2021-03-11 DIAGNOSIS — E11.9 TYPE 2 DIABETES MELLITUS WITHOUT COMPLICATION, WITHOUT LONG-TERM CURRENT USE OF INSULIN: Primary | ICD-10-CM

## 2021-03-11 PROCEDURE — 99214 OFFICE O/P EST MOD 30 MIN: CPT | Mod: S$GLB,,, | Performed by: INTERNAL MEDICINE

## 2021-03-11 PROCEDURE — 99214 PR OFFICE/OUTPT VISIT, EST, LEVL IV, 30-39 MIN: ICD-10-PCS | Mod: S$GLB,,, | Performed by: INTERNAL MEDICINE

## 2021-03-17 ENCOUNTER — TELEPHONE (OUTPATIENT)
Dept: FAMILY MEDICINE | Facility: CLINIC | Age: 86
End: 2021-03-17

## 2021-03-17 LAB
ALBUMIN SERPL-MCNC: 4.3 G/DL (ref 3.6–4.6)
ALBUMIN/CREAT UR: 37 MG/G CREAT (ref 0–29)
ALBUMIN/GLOB SERPL: 1.7 {RATIO} (ref 1.2–2.2)
ALP SERPL-CCNC: 59 IU/L (ref 39–117)
ALT SERPL-CCNC: 15 IU/L (ref 0–32)
APPEARANCE UR: CLEAR
AST SERPL-CCNC: 18 IU/L (ref 0–40)
BACTERIA #/AREA URNS HPF: ABNORMAL /[HPF]
BASOPHILS # BLD AUTO: 0.1 X10E3/UL (ref 0–0.2)
BASOPHILS NFR BLD AUTO: 1 %
BILIRUB SERPL-MCNC: 1.2 MG/DL (ref 0–1.2)
BILIRUB UR QL STRIP: NEGATIVE
BUN SERPL-MCNC: 19 MG/DL (ref 8–27)
BUN/CREAT SERPL: 26 (ref 12–28)
CALCIUM SERPL-MCNC: 9.3 MG/DL (ref 8.7–10.3)
CASTS URNS MICRO: ABNORMAL
CASTS URNS QL MICRO: PRESENT /LPF
CHLORIDE SERPL-SCNC: 103 MMOL/L (ref 96–106)
CHOLEST SERPL-MCNC: 155 MG/DL (ref 100–199)
CO2 SERPL-SCNC: 26 MMOL/L (ref 20–29)
COLOR UR: YELLOW
CREAT SERPL-MCNC: 0.73 MG/DL (ref 0.57–1)
CREAT UR-MCNC: 151.9 MG/DL
CRYSTALS URNS MICRO: ABNORMAL
EOSINOPHIL # BLD AUTO: 0.1 X10E3/UL (ref 0–0.4)
EOSINOPHIL NFR BLD AUTO: 2 %
EPI CELLS #/AREA URNS HPF: ABNORMAL /HPF (ref 0–10)
ERYTHROCYTE [DISTWIDTH] IN BLOOD BY AUTOMATED COUNT: 13 % (ref 11.7–15.4)
GLOBULIN SER CALC-MCNC: 2.5 G/DL (ref 1.5–4.5)
GLUCOSE SERPL-MCNC: 118 MG/DL (ref 65–99)
GLUCOSE UR QL: NEGATIVE
HBA1C MFR BLD: 6.6 % (ref 4.8–5.6)
HCT VFR BLD AUTO: 43 % (ref 34–46.6)
HDLC SERPL-MCNC: 50 MG/DL
HGB BLD-MCNC: 13.3 G/DL (ref 11.1–15.9)
HGB UR QL STRIP: NEGATIVE
IMM GRANULOCYTES # BLD AUTO: 0 X10E3/UL (ref 0–0.1)
IMM GRANULOCYTES NFR BLD AUTO: 0 %
KETONES UR QL STRIP: NEGATIVE
LDLC SERPL CALC-MCNC: 92 MG/DL (ref 0–99)
LEUKOCYTE ESTERASE UR QL STRIP: ABNORMAL
LYMPHOCYTES # BLD AUTO: 2.1 X10E3/UL (ref 0.7–3.1)
LYMPHOCYTES NFR BLD AUTO: 31 %
MCH RBC QN AUTO: 28.3 PG (ref 26.6–33)
MCHC RBC AUTO-ENTMCNC: 30.9 G/DL (ref 31.5–35.7)
MCV RBC AUTO: 92 FL (ref 79–97)
MICRO URNS: ABNORMAL
MICROALBUMIN UR-MCNC: 56.9 UG/ML
MONOCYTES # BLD AUTO: 0.7 X10E3/UL (ref 0.1–0.9)
MONOCYTES NFR BLD AUTO: 11 %
MUCOUS THREADS URNS QL MICRO: PRESENT
NEUTROPHILS # BLD AUTO: 3.9 X10E3/UL (ref 1.4–7)
NEUTROPHILS NFR BLD AUTO: 55 %
NITRITE UR QL STRIP: NEGATIVE
PH UR STRIP: 5.5 [PH] (ref 5–7.5)
PLATELET # BLD AUTO: 198 X10E3/UL (ref 150–450)
POTASSIUM SERPL-SCNC: 4.3 MMOL/L (ref 3.5–5.2)
PROT SERPL-MCNC: 6.8 G/DL (ref 6–8.5)
PROT UR QL STRIP: ABNORMAL
RBC # BLD AUTO: 4.7 X10E6/UL (ref 3.77–5.28)
RBC #/AREA URNS HPF: ABNORMAL /HPF (ref 0–2)
SODIUM SERPL-SCNC: 143 MMOL/L (ref 134–144)
SP GR UR: 1.02 (ref 1–1.03)
TRIGL SERPL-MCNC: 66 MG/DL (ref 0–149)
TSH SERPL DL<=0.005 MIU/L-ACNC: 9.92 UIU/ML (ref 0.45–4.5)
UNIDENT CRYS URNS QL MICRO: PRESENT
UROBILINOGEN UR STRIP-MCNC: 0.2 MG/DL (ref 0.2–1)
VLDLC SERPL CALC-MCNC: 13 MG/DL (ref 5–40)
WBC # BLD AUTO: 6.9 X10E3/UL (ref 3.4–10.8)
WBC #/AREA URNS HPF: >30 /HPF (ref 0–5)

## 2021-04-08 ENCOUNTER — OFFICE VISIT (OUTPATIENT)
Dept: FAMILY MEDICINE | Facility: CLINIC | Age: 86
End: 2021-04-08
Payer: MEDICARE

## 2021-04-08 VITALS
HEART RATE: 81 BPM | OXYGEN SATURATION: 98 % | TEMPERATURE: 97 F | DIASTOLIC BLOOD PRESSURE: 68 MMHG | WEIGHT: 110 LBS | SYSTOLIC BLOOD PRESSURE: 104 MMHG | BODY MASS INDEX: 18.33 KG/M2 | HEIGHT: 65 IN

## 2021-04-08 DIAGNOSIS — S00.03XS: Primary | ICD-10-CM

## 2021-04-08 DIAGNOSIS — R58 ECCHYMOSIS ON EXAMINATION: ICD-10-CM

## 2021-04-08 PROCEDURE — 99212 PR OFFICE/OUTPT VISIT, EST, LEVL II, 10-19 MIN: ICD-10-PCS | Mod: S$GLB,,, | Performed by: INTERNAL MEDICINE

## 2021-04-08 PROCEDURE — 99212 OFFICE O/P EST SF 10 MIN: CPT | Mod: S$GLB,,, | Performed by: INTERNAL MEDICINE

## 2021-08-11 ENCOUNTER — OFFICE VISIT (OUTPATIENT)
Dept: FAMILY MEDICINE | Facility: CLINIC | Age: 86
End: 2021-08-11
Payer: MEDICARE

## 2021-08-11 VITALS
OXYGEN SATURATION: 97 % | HEIGHT: 65 IN | SYSTOLIC BLOOD PRESSURE: 92 MMHG | TEMPERATURE: 97 F | BODY MASS INDEX: 18.66 KG/M2 | HEART RATE: 91 BPM | DIASTOLIC BLOOD PRESSURE: 56 MMHG | WEIGHT: 112 LBS

## 2021-08-11 DIAGNOSIS — I10 ESSENTIAL HYPERTENSION: ICD-10-CM

## 2021-08-11 DIAGNOSIS — I48.91 ATRIAL FIBRILLATION, CONTROLLED: ICD-10-CM

## 2021-08-11 DIAGNOSIS — L98.9 NODULAR LESION ON SURFACE OF SKIN: ICD-10-CM

## 2021-08-11 DIAGNOSIS — E03.4 HYPOTHYROIDISM DUE TO ACQUIRED ATROPHY OF THYROID: ICD-10-CM

## 2021-08-11 DIAGNOSIS — E11.9 TYPE 2 DIABETES MELLITUS WITHOUT COMPLICATION, WITHOUT LONG-TERM CURRENT USE OF INSULIN: Primary | ICD-10-CM

## 2021-08-11 LAB — HBA1C MFR BLD: 6 %

## 2021-08-11 PROCEDURE — 83036 HEMOGLOBIN GLYCOSYLATED A1C: CPT | Mod: QW,,, | Performed by: INTERNAL MEDICINE

## 2021-08-11 PROCEDURE — 83036 POCT HEMOGLOBIN A1C: ICD-10-PCS | Mod: QW,,, | Performed by: INTERNAL MEDICINE

## 2021-08-11 PROCEDURE — 99214 OFFICE O/P EST MOD 30 MIN: CPT | Mod: S$GLB,,, | Performed by: INTERNAL MEDICINE

## 2021-08-11 PROCEDURE — 99214 PR OFFICE/OUTPT VISIT, EST, LEVL IV, 30-39 MIN: ICD-10-PCS | Mod: S$GLB,,, | Performed by: INTERNAL MEDICINE

## 2021-08-11 RX ORDER — METOPROLOL SUCCINATE 50 MG/1
50 TABLET, EXTENDED RELEASE ORAL NIGHTLY
Status: ON HOLD | COMMUNITY
Start: 2021-06-16 | End: 2023-07-17 | Stop reason: DRUGHIGH

## 2022-06-23 ENCOUNTER — OFFICE VISIT (OUTPATIENT)
Dept: FAMILY MEDICINE | Facility: CLINIC | Age: 87
End: 2022-06-23
Payer: MEDICARE

## 2022-06-23 VITALS
DIASTOLIC BLOOD PRESSURE: 50 MMHG | SYSTOLIC BLOOD PRESSURE: 100 MMHG | WEIGHT: 105 LBS | OXYGEN SATURATION: 100 % | HEIGHT: 65 IN | HEART RATE: 82 BPM | TEMPERATURE: 97 F | BODY MASS INDEX: 17.49 KG/M2

## 2022-06-23 DIAGNOSIS — E11.9 TYPE 2 DIABETES MELLITUS WITHOUT COMPLICATION, WITHOUT LONG-TERM CURRENT USE OF INSULIN: Primary | ICD-10-CM

## 2022-06-23 DIAGNOSIS — I48.91 ATRIAL FIBRILLATION, CONTROLLED: ICD-10-CM

## 2022-06-23 DIAGNOSIS — E03.4 HYPOTHYROIDISM DUE TO ACQUIRED ATROPHY OF THYROID: ICD-10-CM

## 2022-06-23 DIAGNOSIS — I10 ESSENTIAL HYPERTENSION: ICD-10-CM

## 2022-06-23 PROCEDURE — 99214 OFFICE O/P EST MOD 30 MIN: CPT | Mod: AQ,S$GLB,, | Performed by: INTERNAL MEDICINE

## 2022-06-23 PROCEDURE — 99214 PR OFFICE/OUTPT VISIT, EST, LEVL IV, 30-39 MIN: ICD-10-PCS | Mod: AQ,S$GLB,, | Performed by: INTERNAL MEDICINE

## 2022-06-23 NOTE — PROGRESS NOTES
Subjective:       Patient ID: Jennifer Alarcon is a 89 y.o. female.    Chief Complaint: Hypertension (Check up its been a year since last seen.) and Diabetes    Here for routine follow up.   Has been having some pain in her feet and nails are really thick.    Diabetes  She presents for her follow-up diabetic visit. She has type 2 diabetes mellitus. Hypoglycemia symptoms include nervousness/anxiousness. Pertinent negatives for hypoglycemia include no confusion, dizziness, headaches, pallor, seizures, speech difficulty or tremors. Associated symptoms include weakness. Pertinent negatives for diabetes include no blurred vision, no chest pain, no fatigue, no foot paresthesias, no foot ulcerations, no polydipsia, no polyphagia, no polyuria and no visual change. Current diabetic treatment includes diet. Her weight is fluctuating minimally. There is no compliance with monitoring of blood glucose. An ACE inhibitor/angiotensin II receptor blocker is not being taken. She sees a podiatrist.Eye exam is not current.   Thyroid Problem  Presents for follow-up visit. Symptoms include anxiety, constipation and palpitations. Patient reports no cold intolerance, diaphoresis, diarrhea, fatigue, heat intolerance, menstrual problem, tremors or visual change. The symptoms have been stable.   Hypertension  This is a chronic problem. The problem is controlled. Associated symptoms include anxiety, palpitations and shortness of breath (on exertion). Pertinent negatives include no blurred vision, chest pain, headaches, malaise/fatigue, neck pain or peripheral edema. (Denies dizziness/lightheadedness) Past treatments include beta blockers. The current treatment provides significant improvement. There are no compliance problems.  Identifiable causes of hypertension include a thyroid problem.     Review of Systems   Constitutional: Negative for activity change, appetite change, chills, diaphoresis, fatigue, fever, malaise/fatigue and unexpected  weight change.   HENT: Positive for hearing loss. Negative for congestion, ear discharge, ear pain, mouth sores, nosebleeds, postnasal drip, rhinorrhea, sinus pressure, sinus pain, sneezing, sore throat, tinnitus, trouble swallowing and voice change.    Eyes: Positive for visual disturbance. Negative for blurred vision, photophobia, pain, discharge, redness and itching.   Respiratory: Positive for shortness of breath (on exertion). Negative for apnea, cough, choking, chest tightness and wheezing.    Cardiovascular: Positive for palpitations. Negative for chest pain and leg swelling.   Gastrointestinal: Positive for constipation. Negative for abdominal distention, abdominal pain, blood in stool, diarrhea, nausea and vomiting.   Endocrine: Negative for cold intolerance, heat intolerance, polydipsia, polyphagia and polyuria.   Genitourinary: Positive for frequency. Negative for decreased urine volume, difficulty urinating, dyspareunia, dysuria, enuresis, genital sores, hematuria, menstrual problem, pelvic pain, urgency, vaginal bleeding, vaginal discharge and vaginal pain.   Musculoskeletal: Negative for arthralgias, back pain, gait problem, joint swelling, myalgias, neck pain and neck stiffness.   Skin: Positive for wound (left foot). Negative for pallor and rash.        Toenail discoloration   Allergic/Immunologic: Negative for environmental allergies, food allergies and immunocompromised state.   Neurological: Positive for weakness. Negative for dizziness, tremors, seizures, syncope, facial asymmetry, speech difficulty, light-headedness, numbness and headaches.   Hematological: Negative for adenopathy. Bruises/bleeds easily.   Psychiatric/Behavioral: Positive for sleep disturbance. Negative for confusion, decreased concentration, hallucinations, self-injury and suicidal ideas. The patient is nervous/anxious.        Past Medical History:   Diagnosis Date    Atrial fibrillation, controlled     Bullous pemphigoid   "   CHF (congestive heart failure)     Colon cancer     Diabetes mellitus, type 2     Eczema     Hypertension     Hypothyroidism       Past Surgical History:   Procedure Laterality Date    APPENDECTOMY      BREAST SURGERY      lt breast bx    CHOLECYSTECTOMY      COLON SURGERY      EYE SURGERY         Family History   Problem Relation Age of Onset    Diabetes Mother     Heart disease Mother     Heart disease Father     Diabetes Father        Social History     Socioeconomic History    Marital status:    Occupational History    Occupation: retired   Tobacco Use    Smoking status: Never Smoker    Smokeless tobacco: Never Used   Substance and Sexual Activity    Alcohol use: No    Drug use: No    Sexual activity: Never   Social History Narrative    Live alone       Current Outpatient Medications   Medication Sig Dispense Refill    bisacodyl (DULCOLAX) 5 mg EC tablet Take 5 mg by mouth daily as needed for Constipation.      metoprolol succinate (TOPROL-XL) 50 MG 24 hr tablet Take 50 mg by mouth nightly.      rivaroxaban (XARELTO) 20 mg Tab Take 1 tablet (20 mg total) by mouth daily with dinner or evening meal. 30 tablet 0     No current facility-administered medications for this visit.       Review of patient's allergies indicates:   Allergen Reactions    Nitrofurantoin monohyd/m-cryst      leg cramps     Objective:    HPI     Hypertension      Additional comments: Check up its been a year since last seen.          Last edited by Tyrel Gonzales Jr., MD on 6/23/2022 10:53 AM. (History)        Blood pressure (!) 100/50, pulse 82, temperature 96.7 °F (35.9 °C), temperature source Temporal, height 5' 5" (1.651 m), weight 47.6 kg (105 lb), SpO2 100 %. Body mass index is 17.47 kg/m².   Physical Exam  Vitals and nursing note reviewed.   Constitutional:       General: She is not in acute distress.     Appearance: She is well-developed. She is not ill-appearing, toxic-appearing or diaphoretic. "   HENT:      Head: Normocephalic and atraumatic.      Right Ear: Decreased hearing noted.      Left Ear: Decreased hearing noted.   Eyes:      General: No scleral icterus.        Right eye: No discharge.         Left eye: No discharge.      Conjunctiva/sclera: Conjunctivae normal.   Neck:      Vascular: No carotid bruit.   Cardiovascular:      Rate and Rhythm: Normal rate. Rhythm irregularly irregular.      Heart sounds: Normal heart sounds. No murmur heard.  Pulmonary:      Effort: Pulmonary effort is normal. No respiratory distress.      Breath sounds: Normal breath sounds. No decreased breath sounds, wheezing, rhonchi or rales.   Abdominal:      General: There is no distension.      Palpations: Abdomen is soft.      Tenderness: There is no abdominal tenderness. There is no guarding or rebound.   Musculoskeletal:      Right lower leg: No edema.      Left lower leg: No edema.   Skin:     General: Skin is warm and dry.      Findings: Rash present.   Neurological:      Mental Status: She is alert.      Motor: No tremor.      Gait: Gait abnormal (walker).   Psychiatric:         Mood and Affect: Mood normal.         Speech: Speech normal.         Behavior: Behavior normal.             Assessment:       1. Type 2 diabetes mellitus without complication, without long-term current use of insulin    2. Essential hypertension    3. Hypothyroidism due to acquired atrophy of thyroid    4. Atrial fibrillation, controlled        Plan:       Jennifer was seen today for hypertension and diabetes.    Diagnoses and all orders for this visit:    Type 2 diabetes mellitus without complication, without long-term current use of insulin  -     Comprehensive Metabolic Panel; Future  -     Hemoglobin A1C; Future  -     Lipid Panel; Future  -     Microalbumin/Creatinine Ratio, Urine; Future  -     Urinalysis; Future  -     TSH; Future  -     Ambulatory referral/consult to Podiatry; Future  -     Comprehensive Metabolic Panel  -     Hemoglobin  A1C  -     Lipid Panel  -     Microalbumin/Creatinine Ratio, Urine  -     Urinalysis  -     TSH    Essential hypertension    Hypothyroidism due to acquired atrophy of thyroid  -     TSH; Future  -     TSH    Atrial fibrillation, controlled  -     CBC Auto Differential; Future  -     CBC Auto Differential

## 2022-06-25 LAB
ALBUMIN SERPL-MCNC: 4.2 G/DL (ref 3.6–4.6)
ALBUMIN/CREAT UR: 11 MG/G CREAT (ref 0–29)
ALBUMIN/GLOB SERPL: 1.8 {RATIO} (ref 1.2–2.2)
ALP SERPL-CCNC: 45 IU/L (ref 44–121)
ALT SERPL-CCNC: 14 IU/L (ref 0–32)
APPEARANCE UR: CLEAR
AST SERPL-CCNC: 22 IU/L (ref 0–40)
BASOPHILS # BLD AUTO: 0 X10E3/UL (ref 0–0.2)
BASOPHILS NFR BLD AUTO: 1 %
BILIRUB SERPL-MCNC: 1.3 MG/DL (ref 0–1.2)
BILIRUB UR QL STRIP: NEGATIVE
BUN SERPL-MCNC: 16 MG/DL (ref 8–27)
BUN/CREAT SERPL: 22 (ref 12–28)
CALCIUM SERPL-MCNC: 9.5 MG/DL (ref 8.7–10.3)
CHLORIDE SERPL-SCNC: 103 MMOL/L (ref 96–106)
CHOLEST SERPL-MCNC: 163 MG/DL (ref 100–199)
CO2 SERPL-SCNC: 27 MMOL/L (ref 20–29)
COLOR UR: YELLOW
CREAT SERPL-MCNC: 0.73 MG/DL (ref 0.57–1)
CREAT UR-MCNC: 51.3 MG/DL
EOSINOPHIL # BLD AUTO: 0.1 X10E3/UL (ref 0–0.4)
EOSINOPHIL NFR BLD AUTO: 2 %
ERYTHROCYTE [DISTWIDTH] IN BLOOD BY AUTOMATED COUNT: 13.2 % (ref 11.7–15.4)
EST. GFR  (NO RACE VARIABLE): 79 ML/MIN/1.73
GLOBULIN SER CALC-MCNC: 2.3 G/DL (ref 1.5–4.5)
GLUCOSE SERPL-MCNC: 108 MG/DL (ref 65–99)
GLUCOSE UR QL STRIP: NEGATIVE
HBA1C MFR BLD: 6.3 % (ref 4.8–5.6)
HCT VFR BLD AUTO: 42.6 % (ref 34–46.6)
HDLC SERPL-MCNC: 53 MG/DL
HGB BLD-MCNC: 13.1 G/DL (ref 11.1–15.9)
HGB UR QL STRIP: NEGATIVE
IMM GRANULOCYTES # BLD AUTO: 0 X10E3/UL (ref 0–0.1)
IMM GRANULOCYTES NFR BLD AUTO: 0 %
KETONES UR QL STRIP: NEGATIVE
LDLC SERPL CALC-MCNC: 96 MG/DL (ref 0–99)
LEUKOCYTE ESTERASE UR QL STRIP: NEGATIVE
LYMPHOCYTES # BLD AUTO: 1.7 X10E3/UL (ref 0.7–3.1)
LYMPHOCYTES NFR BLD AUTO: 30 %
MCH RBC QN AUTO: 27.8 PG (ref 26.6–33)
MCHC RBC AUTO-ENTMCNC: 30.8 G/DL (ref 31.5–35.7)
MCV RBC AUTO: 90 FL (ref 79–97)
MICRO URNS: NORMAL
MICROALBUMIN UR-MCNC: 5.7 UG/ML
MONOCYTES # BLD AUTO: 0.5 X10E3/UL (ref 0.1–0.9)
MONOCYTES NFR BLD AUTO: 10 %
NEUTROPHILS # BLD AUTO: 3.3 X10E3/UL (ref 1.4–7)
NEUTROPHILS NFR BLD AUTO: 57 %
NITRITE UR QL STRIP: NEGATIVE
PH UR STRIP: 6 [PH] (ref 5–7.5)
PLATELET # BLD AUTO: 172 X10E3/UL (ref 150–450)
POTASSIUM SERPL-SCNC: 4.3 MMOL/L (ref 3.5–5.2)
PROT SERPL-MCNC: 6.5 G/DL (ref 6–8.5)
PROT UR QL STRIP: NEGATIVE
RBC # BLD AUTO: 4.71 X10E6/UL (ref 3.77–5.28)
SODIUM SERPL-SCNC: 143 MMOL/L (ref 134–144)
SP GR UR STRIP: 1.02 (ref 1–1.03)
TRIGL SERPL-MCNC: 74 MG/DL (ref 0–149)
TSH SERPL DL<=0.005 MIU/L-ACNC: 10.6 UIU/ML (ref 0.45–4.5)
UROBILINOGEN UR STRIP-MCNC: 0.2 MG/DL (ref 0.2–1)
VLDLC SERPL CALC-MCNC: 14 MG/DL (ref 5–40)
WBC # BLD AUTO: 5.6 X10E3/UL (ref 3.4–10.8)

## 2022-06-27 ENCOUNTER — TELEPHONE (OUTPATIENT)
Dept: FAMILY MEDICINE | Facility: CLINIC | Age: 87
End: 2022-06-27

## 2022-06-27 DIAGNOSIS — E03.4 HYPOTHYROIDISM DUE TO ACQUIRED ATROPHY OF THYROID: Primary | ICD-10-CM

## 2022-06-27 RX ORDER — LIOTHYRONINE SODIUM 5 UG/1
5 TABLET ORAL DAILY
Qty: 30 TABLET | Refills: 6 | Status: SHIPPED | OUTPATIENT
Start: 2022-06-27 | End: 2022-07-12 | Stop reason: SINTOL

## 2022-06-27 NOTE — TELEPHONE ENCOUNTER
----- Message from Tyrel Gonzales Jr., MD sent at 6/27/2022  9:27 AM CDT -----  Diabetes looks fine but thyroid is worsening.  I know she has had some issues with levothyroxine in the past.  There is another medication that we sometimes use called cytomel that I would like her to try.

## 2022-06-27 NOTE — TELEPHONE ENCOUNTER
Reviewed labs with pt's daughter and she would like to know if the new medication is safe for her to take with having a-fib.

## 2022-07-07 ENCOUNTER — TELEPHONE (OUTPATIENT)
Dept: FAMILY MEDICINE | Facility: CLINIC | Age: 87
End: 2022-07-07

## 2022-07-07 NOTE — TELEPHONE ENCOUNTER
Pt notified and will call Monday morning to let me know if stopping the medication resolved the ankle swelling.

## 2022-07-07 NOTE — TELEPHONE ENCOUNTER
Pt called and is c/o very swollen ankles and is concerned the cytomel is causing it. Please advise.

## 2022-07-12 ENCOUNTER — OFFICE VISIT (OUTPATIENT)
Dept: FAMILY MEDICINE | Facility: CLINIC | Age: 87
End: 2022-07-12
Payer: MEDICARE

## 2022-07-12 VITALS
TEMPERATURE: 97 F | OXYGEN SATURATION: 97 % | WEIGHT: 104 LBS | HEART RATE: 103 BPM | SYSTOLIC BLOOD PRESSURE: 110 MMHG | HEIGHT: 65 IN | DIASTOLIC BLOOD PRESSURE: 60 MMHG | BODY MASS INDEX: 17.33 KG/M2

## 2022-07-12 DIAGNOSIS — R60.0 PEDAL EDEMA: Primary | ICD-10-CM

## 2022-07-12 DIAGNOSIS — E03.4 HYPOTHYROIDISM DUE TO ACQUIRED ATROPHY OF THYROID: ICD-10-CM

## 2022-07-12 PROCEDURE — 99212 OFFICE O/P EST SF 10 MIN: CPT | Mod: AQ,S$GLB,, | Performed by: INTERNAL MEDICINE

## 2022-07-12 PROCEDURE — 99212 PR OFFICE/OUTPT VISIT, EST, LEVL II, 10-19 MIN: ICD-10-PCS | Mod: AQ,S$GLB,, | Performed by: INTERNAL MEDICINE

## 2022-07-12 NOTE — PROGRESS NOTES
Subjective:       Patient ID: Jennifer Alarcon is a 89 y.o. female.    Chief Complaint: Foot Swelling (Bilateral feet,, leg, and ankles swelling when she started taking the liothyronine)    Here for evaluation of bilateral pedal edema noted last week.  It was noted shortly after starting cytomel so that has been held for last several days and feet are improving.      Review of Systems   Constitutional: Positive for fatigue and unexpected weight change (loss). Negative for activity change, appetite change, chills, diaphoresis and fever.   HENT: Positive for hearing loss and rhinorrhea (in the morning). Negative for congestion, ear discharge, ear pain, mouth sores, nosebleeds, postnasal drip, sinus pressure, sinus pain, sneezing, sore throat, tinnitus, trouble swallowing and voice change.    Eyes: Negative for photophobia, pain, discharge, redness, itching and visual disturbance.   Respiratory: Negative for apnea, cough, choking, chest tightness, shortness of breath and wheezing.    Cardiovascular: Positive for leg swelling. Negative for chest pain and palpitations.   Gastrointestinal: Positive for nausea. Negative for abdominal distention, abdominal pain, blood in stool, constipation, diarrhea and vomiting.   Endocrine: Negative for cold intolerance, heat intolerance, polydipsia and polyuria.   Genitourinary: Positive for frequency. Negative for decreased urine volume, difficulty urinating, dyspareunia, dysuria, enuresis, genital sores, hematuria, menstrual problem, pelvic pain, urgency, vaginal bleeding, vaginal discharge and vaginal pain.   Musculoskeletal: Positive for joint swelling (ankles, legs, feet). Negative for arthralgias, back pain, gait problem, myalgias, neck pain and neck stiffness.   Skin: Negative for rash and wound.   Allergic/Immunologic: Negative for environmental allergies, food allergies and immunocompromised state.   Neurological: Positive for weakness. Negative for dizziness, tremors, seizures,  syncope, facial asymmetry, speech difficulty, light-headedness, numbness and headaches.   Hematological: Negative for adenopathy. Does not bruise/bleed easily.   Psychiatric/Behavioral: Negative for confusion, decreased concentration, hallucinations, self-injury, sleep disturbance and suicidal ideas. The patient is nervous/anxious.        Past Medical History:   Diagnosis Date    Atrial fibrillation, controlled     Bullous pemphigoid     CHF (congestive heart failure)     Colon cancer     Diabetes mellitus, type 2     Eczema     Hypertension     Hypothyroidism       Past Surgical History:   Procedure Laterality Date    APPENDECTOMY      BREAST SURGERY      lt breast bx    CHOLECYSTECTOMY      COLON SURGERY      EYE SURGERY         Family History   Problem Relation Age of Onset    Diabetes Mother     Heart disease Mother     Heart disease Father     Diabetes Father        Social History     Socioeconomic History    Marital status:    Occupational History    Occupation: retired   Tobacco Use    Smoking status: Never Smoker    Smokeless tobacco: Never Used   Substance and Sexual Activity    Alcohol use: No    Drug use: No    Sexual activity: Never   Social History Narrative    Live alone       Current Outpatient Medications   Medication Sig Dispense Refill    bisacodyl (DULCOLAX) 5 mg EC tablet Take 5 mg by mouth daily as needed for Constipation.      metoprolol succinate (TOPROL-XL) 50 MG 24 hr tablet Take 50 mg by mouth nightly.      rivaroxaban (XARELTO) 20 mg Tab Take 1 tablet (20 mg total) by mouth daily with dinner or evening meal. 30 tablet 0     No current facility-administered medications for this visit.       Review of patient's allergies indicates:   Allergen Reactions    Nitrofurantoin monohyd/m-cryst      leg cramps     Objective:    HPI     Foot Swelling      Additional comments: Bilateral feet,, leg, and ankles swelling when she   started taking the liothyronine        "   Last edited by Mario Pichardo MA on 7/12/2022 10:41 AM. (History)      Blood pressure 110/60, pulse 103, temperature 96.7 °F (35.9 °C), temperature source Temporal, height 5' 5" (1.651 m), weight 47.2 kg (104 lb), SpO2 97 %. Body mass index is 17.31 kg/m².   Physical Exam  Vitals and nursing note reviewed.   Musculoskeletal:      Comments: Mild pedal edema             Assessment:       1. Pedal edema    2. Hypothyroidism due to acquired atrophy of thyroid        Plan:       Jennifer was seen today for foot swelling.    Diagnoses and all orders for this visit:    Pedal edema  Comments:  Improving off cytomel    Hypothyroidism due to acquired atrophy of thyroid  Comments:  She has side effects with both synthroid and cytomel.  She really hasn't been symptomatic even with TSH > 10           "

## 2022-08-08 ENCOUNTER — HOSPITAL ENCOUNTER (EMERGENCY)
Facility: HOSPITAL | Age: 87
Discharge: HOME | End: 2022-08-08
Attending: EMERGENCY MEDICINE
Payer: MEDICARE

## 2022-08-08 VITALS
WEIGHT: 110 LBS | OXYGEN SATURATION: 98 % | HEART RATE: 85 BPM | RESPIRATION RATE: 17 BRPM | TEMPERATURE: 98 F | BODY MASS INDEX: 18.33 KG/M2 | DIASTOLIC BLOOD PRESSURE: 64 MMHG | HEIGHT: 65 IN | SYSTOLIC BLOOD PRESSURE: 122 MMHG
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
## 2022-08-08 DIAGNOSIS — S20.211A CONTUSION OF RIB ON RIGHT SIDE, INITIAL ENCOUNTER: ICD-10-CM
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
## 2022-08-08 DIAGNOSIS — S63.502A WRIST SPRAIN, LEFT, INITIAL ENCOUNTER: ICD-10-CM

## 2022-08-08 DIAGNOSIS — R07.9 CHEST PAIN: ICD-10-CM

## 2022-08-08 LAB
ALBUMIN SERPL BCP-MCNC: 4 G/DL (ref 3.5–5.2)
ALP SERPL-CCNC: 36 U/L (ref 55–135)
ALT SERPL W/O P-5'-P-CCNC: 15 U/L (ref 10–44)
ANION GAP SERPL CALC-SCNC: 9 MMOL/L (ref 8–16)
AST SERPL-CCNC: 22 U/L (ref 10–40)
BACTERIA #/AREA URNS HPF: NEGATIVE /HPF
BASOPHILS # BLD AUTO: 0.04 K/UL (ref 0–0.2)
BASOPHILS NFR BLD: 0.6 % (ref 0–1.9)
BILIRUB SERPL-MCNC: 2.2 MG/DL (ref 0.1–1)
BILIRUB UR QL STRIP: NEGATIVE
BNP SERPL-MCNC: 351 PG/ML (ref 0–99)
BUN SERPL-MCNC: 16 MG/DL (ref 8–23)
CALCIUM SERPL-MCNC: 9.2 MG/DL (ref 8.7–10.5)
CHLORIDE SERPL-SCNC: 103 MMOL/L (ref 95–110)
CLARITY UR: CLEAR
CO2 SERPL-SCNC: 27 MMOL/L (ref 23–29)
COLOR UR: YELLOW
CREAT SERPL-MCNC: 0.6 MG/DL (ref 0.5–1.4)
DIFFERENTIAL METHOD: NORMAL
EOSINOPHIL # BLD AUTO: 0.1 K/UL (ref 0–0.5)
EOSINOPHIL NFR BLD: 0.7 % (ref 0–8)
ERYTHROCYTE [DISTWIDTH] IN BLOOD BY AUTOMATED COUNT: 13.6 % (ref 11.5–14.5)
EST. GFR  (NO RACE VARIABLE): >60 ML/MIN/1.73 M^2
GLUCOSE SERPL-MCNC: 87 MG/DL (ref 70–110)
GLUCOSE SERPL-MCNC: 97 MG/DL (ref 70–110)
GLUCOSE UR QL STRIP: NEGATIVE
HCT VFR BLD AUTO: 41 % (ref 37–48.5)
HGB BLD-MCNC: 13.2 G/DL (ref 12–16)
HGB UR QL STRIP: NEGATIVE
HYALINE CASTS #/AREA URNS LPF: 4 /LPF
IMM GRANULOCYTES # BLD AUTO: 0.02 K/UL (ref 0–0.04)
IMM GRANULOCYTES NFR BLD AUTO: 0.3 % (ref 0–0.5)
KETONES UR QL STRIP: NEGATIVE
LEUKOCYTE ESTERASE UR QL STRIP: ABNORMAL
LYMPHOCYTES # BLD AUTO: 1.6 K/UL (ref 1–4.8)
LYMPHOCYTES NFR BLD: 22.9 % (ref 18–48)
MAGNESIUM SERPL-MCNC: 1.9 MG/DL (ref 1.6–2.6)
MCH RBC QN AUTO: 29.5 PG (ref 27–31)
MCHC RBC AUTO-ENTMCNC: 32.2 G/DL (ref 32–36)
MCV RBC AUTO: 92 FL (ref 82–98)
MICROSCOPIC COMMENT: ABNORMAL
MONOCYTES # BLD AUTO: 0.6 K/UL (ref 0.3–1)
MONOCYTES NFR BLD: 9.3 % (ref 4–15)
NEUTROPHILS # BLD AUTO: 4.5 K/UL (ref 1.8–7.7)
NEUTROPHILS NFR BLD: 66.2 % (ref 38–73)
NITRITE UR QL STRIP: NEGATIVE
NRBC BLD-RTO: 0 /100 WBC
PH UR STRIP: 7 [PH] (ref 5–8)
PLATELET # BLD AUTO: 172 K/UL (ref 150–450)
PMV BLD AUTO: 10.5 FL (ref 9.2–12.9)
POTASSIUM SERPL-SCNC: 4.3 MMOL/L (ref 3.5–5.1)
PROT SERPL-MCNC: 7.1 G/DL (ref 6–8.4)
PROT UR QL STRIP: NEGATIVE
RBC # BLD AUTO: 4.48 M/UL (ref 4–5.4)
RBC #/AREA URNS HPF: 2 /HPF (ref 0–4)
SODIUM SERPL-SCNC: 139 MMOL/L (ref 136–145)
SP GR UR STRIP: 1.01 (ref 1–1.03)
SQUAMOUS #/AREA URNS HPF: 2 /HPF
TROPONIN I SERPL DL<=0.01 NG/ML-MCNC: <0.03 NG/ML
TSH SERPL DL<=0.005 MIU/L-ACNC: 4.37 UIU/ML (ref 0.34–5.6)
URN SPEC COLLECT METH UR: ABNORMAL
UROBILINOGEN UR STRIP-ACNC: NEGATIVE EU/DL
WBC # BLD AUTO: 6.8 K/UL (ref 3.9–12.7)
WBC #/AREA URNS HPF: 5 /HPF (ref 0–5)

## 2022-08-08 PROCEDURE — 25500020 PHARM REV CODE 255: Performed by: EMERGENCY MEDICINE

## 2022-08-08 PROCEDURE — 83735 ASSAY OF MAGNESIUM: CPT | Performed by: EMERGENCY MEDICINE

## 2022-08-08 PROCEDURE — 85025 COMPLETE CBC W/AUTO DIFF WBC: CPT | Performed by: EMERGENCY MEDICINE

## 2022-08-08 PROCEDURE — 81001 URINALYSIS AUTO W/SCOPE: CPT | Performed by: EMERGENCY MEDICINE

## 2022-08-08 PROCEDURE — 82962 GLUCOSE BLOOD TEST: CPT

## 2022-08-08 PROCEDURE — 99285 EMERGENCY DEPT VISIT HI MDM: CPT | Mod: 25

## 2022-08-08 PROCEDURE — 93010 ELECTROCARDIOGRAM REPORT: CPT | Mod: ,,, | Performed by: INTERNAL MEDICINE

## 2022-08-08 PROCEDURE — 84484 ASSAY OF TROPONIN QUANT: CPT | Performed by: EMERGENCY MEDICINE

## 2022-08-08 PROCEDURE — 83880 ASSAY OF NATRIURETIC PEPTIDE: CPT | Performed by: EMERGENCY MEDICINE

## 2022-08-08 PROCEDURE — 80053 COMPREHEN METABOLIC PANEL: CPT | Performed by: EMERGENCY MEDICINE

## 2022-08-08 PROCEDURE — 93005 ELECTROCARDIOGRAM TRACING: CPT | Performed by: INTERNAL MEDICINE

## 2022-08-08 PROCEDURE — 93010 EKG 12-LEAD: ICD-10-PCS | Mod: ,,, | Performed by: INTERNAL MEDICINE

## 2022-08-08 PROCEDURE — 84443 ASSAY THYROID STIM HORMONE: CPT | Performed by: EMERGENCY MEDICINE

## 2022-08-08 RX ADMIN — IOHEXOL 100 ML: 350 INJECTION, SOLUTION INTRAVENOUS at 01:08

## 2022-08-08 NOTE — ED PROVIDER NOTES
Encounter Date: 8/8/2022       History     Chief Complaint   Patient presents with    Fall     ON Thursday, FROM STANDING POSITION, FELL BACK WARDS. NO LOC    RT RIB PAIN     FROM THE FALL     89-year-old female presents to the emergency department past medical history includes atrial fibrillation currently on Xarelto, hypertension, diabetes.  Patient states that she was attempting to kill a steele last Thursday she states that she accidentally fell backwards used her left wrist to stop her fall complaining of pain to the wrist, and also reports that she hit the right side of her chest.  Patient states that she has a bruised her chest there is been hurting since the fall she denies striking her head she denies any chest pain or shortness of breath she states that she has just been feeling little weak patient denies any preceding symptoms suggestive of syncope she states that she fell because she lost her balance attempting to kill a steele        Review of patient's allergies indicates:   Allergen Reactions    Nitrofurantoin monohyd/m-cryst      leg cramps     Past Medical History:   Diagnosis Date    Atrial fibrillation, controlled     Bullous pemphigoid     CHF (congestive heart failure)     Colon cancer     Diabetes mellitus, type 2     Eczema     Hypertension     Hypothyroidism      Past Surgical History:   Procedure Laterality Date    APPENDECTOMY      BREAST SURGERY      lt breast bx    CHOLECYSTECTOMY      COLON SURGERY      EYE SURGERY       Family History   Problem Relation Age of Onset    Diabetes Mother     Heart disease Mother     Heart disease Father     Diabetes Father      Social History     Tobacco Use    Smoking status: Never Smoker    Smokeless tobacco: Never Used   Substance Use Topics    Alcohol use: No    Drug use: No     Review of Systems   Constitutional: Negative.    HENT: Negative.    Respiratory: Negative.    Cardiovascular:        Chest bruising   Gastrointestinal:  Negative.    Musculoskeletal:        Left wrist pain   Skin: Positive for color change.        Bruising to the right chest, left wrist   Neurological: Negative.    Hematological: Bruises/bleeds easily.   Psychiatric/Behavioral: Negative.    All other systems reviewed and are negative.      Physical Exam     Initial Vitals [08/08/22 0850]   BP Pulse Resp Temp SpO2   133/77 97 20 98.6 °F (37 °C) 98 %      MAP       --         Physical Exam    Nursing note and vitals reviewed.  Constitutional: She appears well-developed and well-nourished.   HENT:   Head: Normocephalic and atraumatic.   Right Ear: External ear normal.   Left Ear: External ear normal.   Eyes: EOM are normal. Pupils are equal, round, and reactive to light.   Neck: Neck supple.   Normal range of motion.  Cardiovascular: Regular rhythm and intact distal pulses.   Pulmonary/Chest: She exhibits tenderness.   Bruising noted to the right lateral chest wall   Abdominal: Abdomen is soft. Bowel sounds are normal. She exhibits no distension. There is no abdominal tenderness.   Musculoskeletal:      Left wrist: Tenderness present. No bony tenderness, snuff box tenderness or crepitus. Decreased range of motion. Normal pulse.      Cervical back: Normal range of motion and neck supple.           ED Course   Procedures  Labs Reviewed   COMPREHENSIVE METABOLIC PANEL - Abnormal; Notable for the following components:       Result Value    Total Bilirubin 2.2 (*)     Alkaline Phosphatase 36 (*)     All other components within normal limits   B-TYPE NATRIURETIC PEPTIDE - Abnormal; Notable for the following components:     (*)     All other components within normal limits   URINALYSIS, REFLEX TO URINE CULTURE - Abnormal; Notable for the following components:    Leukocytes, UA 1+ (*)     All other components within normal limits    Narrative:     Specimen Source->Urine   URINALYSIS MICROSCOPIC - Abnormal; Notable for the following components:    Hyaline Casts, UA 4 (*)      All other components within normal limits    Narrative:     Specimen Source->Urine   CBC W/ AUTO DIFFERENTIAL   TROPONIN I   MAGNESIUM   TSH   POCT GLUCOSE   POCT GLUCOSE MONITORING CONTINUOUS     EKG Readings: (Independently Interpreted)   Initial Reading: No STEMI. Rhythm: Atrial Fibrillation. Heart Rate: 110. Ectopy: No Ectopy. Conduction: LBBB.     ECG Results          EKG 12-lead (In process)  Result time 08/08/22 11:26:30    In process by Interface, Lab In [facility] (08/08/22 11:26:30)                 Narrative:    Test Reason : R07.9,    Vent. Rate : 110 BPM     Atrial Rate : 096 BPM     P-R Int : 000 ms          QRS Dur : 130 ms      QT Int : 306 ms       P-R-T Axes : 000 -71 084 degrees     QTc Int : 414 ms    Atrial fibrillation with rapid ventricular response  Left axis deviation  Left bundle branch block  Abnormal ECG  When compared with ECG of 30-DEC-2019 06:31,  Nonspecific T wave abnormality no longer evident in Inferior leads  T wave inversion no longer evident in Anterior leads    Referred By: [email or web address]   SELF           Confirmed By:                             Imaging Results          CT Chest With Contrast (Final result)  Result time 08/08/22 14:18:00    Final result by Jane Carter MD (08/08/22 14:18:00)                 Narrative:    CMS MANDATED QUALITY DATA - CT RADIATION - 436    All CT scans at this facility utilize dose modulation, iterative reconstruction, and/or weight based dosing when appropriate to reduce radiation dose to as low as reasonably achievable.        Reason: Blunt trauma, fall    TECHNIQUE: CT thorax with 100 mL Omnipaque 350.    COMPARISON: None    CT THORAX:  Evaluation of the base of the neck is unremarkable.    The heart is normal in size. The aorta is normal in caliber without aneurysm or dissection. There is no pericardial effusion. There is no hilar, mediastinal or axillary adenopathy.    There are no pulmonary nodules, infiltrates or pleural effusions. The  trachea and bronchi are normal. Esophagus is normal.    There are degenerative changes of the spine. There is a vertebral body hemangioma in T7. There are no fractures.    The visualized portion of the upper abdomen demonstrate prior cholecystectomy. There is vascular calcification in the splenic artery.    IMPRESSION: No acute pulmonary process    No evidence of fracture    Electronically signed by:  Jane Carter MD  8/8/2022 2:18 PM CDT Workstation: [id number]                             X-Ray Wrist Complete Left (Final result)  Result time 08/08/22 09:45:30    Final result by Jane Carter MD (08/08/22 09:45:30)                 Narrative:    Four views of the left wrist    Clinical history is pain after fall    There is osteopenia. There are degenerative changes of the radiocarpal and distal radial ulnar joint. There are no fractures, dislocations or acute osseous abnormalities. The soft tissues are normal.    IMPRESSION: Degenerative changes of the left wrist without evidence of fracture    Electronically signed by:  Jane Carter MD  8/8/2022 9:45 AM CDT Workstation: [id number]                             XR Ribs Min 3 Views w/PA Chest Right (Final result)  Result time 08/08/22 09:47:44    Final result by Jane Carter MD (08/08/22 09:47:44)                 Narrative:    AP chest with right RIBS four views    Clinical history is pain    The lungs are clear. There is no pneumothorax or pleural effusion. The cardiomediastinal silhouette is normal in size. There is vascular calcification of the aorta. There are no fractures or acute osseous abnormalities. There are degenerative changes of the spine. There are surgical clips in the right upper quadrant with vascular calcification.    IMPRESSION: No acute pulmonary process    No evidence of right rib fracture    Degenerative changes of the spine    Electronically signed by:  Jane Carter MD  8/8/2022 9:47 AM CDT Workstation: [id number]                             X-Rays:   Independently Interpreted Readings:   Chest X-Ray: No infiltrates.  No acute abnormalities.  Normal heart size.     Medications   iohexoL (OMNIPAQUE 350) injection 100 mL (100 mLs Intravenous Given 8/8/22 4789)     Medical Decision Making:   Initial Assessment:   89-year-old female presents to the emergency department past medical history includes atrial fibrillation currently on Xarelto, hypertension, diabetes.  Patient states that she was attempting to kill a steele last Thursday she states that she accidentally fell backwards used her left wrist to stop her fall complaining of pain to the wrist, and also reports that she hit the right side of her chest.  Patient states that she has a bruised her chest there is been hurting since the fall she denies striking her head she denies any chest pain or shortness of breath she states that she has just been feeling little weak patient denies any preceding symptoms suggestive of syncope she states that she fell because she lost her balance attempting to kill a steele        Differential Diagnosis:   Considerations include contusion, fracture, hematoma  Independently Interpreted Test(s):   I have ordered and independently interpreted X-rays - see prior notes.  Clinical Tests:   Lab Tests: Ordered and Reviewed  The following lab test(s) were unremarkable: CBC and Troponin       <> Summary of Lab: Bili 2.2  Troponin 391  Normal Mag normal TSH  Urine with 1+ leukocytes no blood  Radiological Study: Ordered and Reviewed  ED Management:  Attending Note:    I discussed the patient's care with Advanced Practice Clinician.  I reviewed their note and agree with the history, physical, assessment, diagnosis, treatment, all procedures performed, xray and EKG interpretations and discharge plan provided by the Advanced Practice Clinician. My overall impression is fall, home of a right-sided chest pain with the right rib contusion, left wrist sprain .  The  patient has been instructed to follow up with their physician or the one provided as well as specific return precautions.   Claudia Curtis M.D. 8/8/2022 3:10 PM    89-year-old female presents emergency department reports that she lost her balance attempted to kill a steele approximately 1 week ago she sustained a bruise to the right lateral chest that is tender with palpation, and with movement she has a bruise to her left wrist she has a hand stop her fall x-rays of the wrist are unremarkable with no acute fracture noted, x-ray imaging of the ribs were unremarkable patient is on Xarelto and does have a large amount of bruising noted to the right lateral chest wall there for further evaluation performed with labs and CT imaging of the chest there are no obvious rib fractures noted on CT scan, no obvious bleeding into the chest labs unremarkable, platelets  H&H  Unremarkable.  Patient will be discharged home instructed to take Tylenol for pain given detailed return precautions                        Clinical Impression:   Final diagnoses:  [W19.XXXA] Fall  [R07.9] Chest pain  [W19.XXXA] Fall, initial encounter (Primary)  [S20.211A] Contusion of rib on right side, initial encounter  [S63.502A] Wrist sprain, left, initial encounter          ED Disposition Condition    Discharge Stable        ED Prescriptions     None        Follow-up Information     Follow up With Specialties Details Why Contact Info    Tyrel Gonzales Jr., MD Internal Medicine Schedule an appointment as soon as possible for a visit in 2 days  [address] [place] [address]  [phone]             Tabby Hickey, NANCY  08/08/22 1522

## 2023-01-28 ENCOUNTER — HOSPITAL ENCOUNTER (EMERGENCY)
Facility: HOSPITAL | Age: 88
Discharge: HOME | End: 2023-01-28
Attending: EMERGENCY MEDICINE
Payer: MEDICARE

## 2023-01-28 VITALS
WEIGHT: 110 LBS | SYSTOLIC BLOOD PRESSURE: 128 MMHG | HEART RATE: 87 BPM | BODY MASS INDEX: 18.33 KG/M2 | DIASTOLIC BLOOD PRESSURE: 63 MMHG | HEIGHT: 65 IN | RESPIRATION RATE: 18 BRPM | TEMPERATURE: 98 F | OXYGEN SATURATION: 98 %
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
## 2023-01-28 DIAGNOSIS — S05.11XA PERIORBITAL CONTUSION OF RIGHT EYE, INITIAL ENCOUNTER: Primary | ICD-10-CM

## 2023-01-28 LAB
ALBUMIN SERPL BCP-MCNC: 3.8 G/DL (ref 3.5–5.2)
ALP SERPL-CCNC: 40 U/L (ref 55–135)
ALT SERPL W/O P-5'-P-CCNC: 15 U/L (ref 10–44)
ANION GAP SERPL CALC-SCNC: 4 MMOL/L (ref 8–16)
AST SERPL-CCNC: 22 U/L (ref 10–40)
BASOPHILS # BLD AUTO: 0.03 K/UL (ref 0–0.2)
BASOPHILS NFR BLD: 0.5 % (ref 0–1.9)
BILIRUB SERPL-MCNC: 1.4 MG/DL (ref 0.1–1)
BILIRUB UR QL STRIP: NEGATIVE
BUN SERPL-MCNC: 21 MG/DL (ref 8–23)
CALCIUM SERPL-MCNC: 9.4 MG/DL (ref 8.7–10.5)
CHLORIDE SERPL-SCNC: 101 MMOL/L (ref 95–110)
CLARITY UR: CLEAR
CO2 SERPL-SCNC: 31 MMOL/L (ref 23–29)
COLOR UR: YELLOW
CREAT SERPL-MCNC: 0.6 MG/DL (ref 0.5–1.4)
DIFFERENTIAL METHOD: ABNORMAL
EOSINOPHIL # BLD AUTO: 0.1 K/UL (ref 0–0.5)
EOSINOPHIL NFR BLD: 0.9 % (ref 0–8)
ERYTHROCYTE [DISTWIDTH] IN BLOOD BY AUTOMATED COUNT: 14 % (ref 11.5–14.5)
EST. GFR  (NO RACE VARIABLE): >60 ML/MIN/1.73 M^2
GLUCOSE SERPL-MCNC: 142 MG/DL (ref 70–110)
GLUCOSE UR QL STRIP: NEGATIVE
HCT VFR BLD AUTO: 37.7 % (ref 37–48.5)
HGB BLD-MCNC: 11.9 G/DL (ref 12–16)
HGB UR QL STRIP: NEGATIVE
IMM GRANULOCYTES # BLD AUTO: 0.02 K/UL (ref 0–0.04)
IMM GRANULOCYTES NFR BLD AUTO: 0.3 % (ref 0–0.5)
KETONES UR QL STRIP: NEGATIVE
LEUKOCYTE ESTERASE UR QL STRIP: NEGATIVE
LYMPHOCYTES # BLD AUTO: 1.1 K/UL (ref 1–4.8)
LYMPHOCYTES NFR BLD: 17.4 % (ref 18–48)
MCH RBC QN AUTO: 28.8 PG (ref 27–31)
MCHC RBC AUTO-ENTMCNC: 31.6 G/DL (ref 32–36)
MCV RBC AUTO: 91 FL (ref 82–98)
MONOCYTES # BLD AUTO: 0.6 K/UL (ref 0.3–1)
MONOCYTES NFR BLD: 9.5 % (ref 4–15)
NEUTROPHILS # BLD AUTO: 4.6 K/UL (ref 1.8–7.7)
NEUTROPHILS NFR BLD: 71.4 % (ref 38–73)
NITRITE UR QL STRIP: NEGATIVE
NRBC BLD-RTO: 0 /100 WBC
PH UR STRIP: 6 [PH] (ref 5–8)
PLATELET # BLD AUTO: 163 K/UL (ref 150–450)
PMV BLD AUTO: 10.3 FL (ref 9.2–12.9)
POTASSIUM SERPL-SCNC: 4.4 MMOL/L (ref 3.5–5.1)
PROT SERPL-MCNC: 6.8 G/DL (ref 6–8.4)
PROT UR QL STRIP: NEGATIVE
RBC # BLD AUTO: 4.13 M/UL (ref 4–5.4)
SODIUM SERPL-SCNC: 136 MMOL/L (ref 136–145)
SP GR UR STRIP: 1.01 (ref 1–1.03)
URN SPEC COLLECT METH UR: NORMAL
UROBILINOGEN UR STRIP-ACNC: NEGATIVE EU/DL
WBC # BLD AUTO: 6.39 K/UL (ref 3.9–12.7)

## 2023-01-28 PROCEDURE — 25000003 PHARM REV CODE 250: Performed by: EMERGENCY MEDICINE

## 2023-01-28 PROCEDURE — 85025 COMPLETE CBC W/AUTO DIFF WBC: CPT | Performed by: EMERGENCY MEDICINE

## 2023-01-28 PROCEDURE — 80053 COMPREHEN METABOLIC PANEL: CPT | Performed by: EMERGENCY MEDICINE

## 2023-01-28 PROCEDURE — 81003 URINALYSIS AUTO W/O SCOPE: CPT | Performed by: EMERGENCY MEDICINE

## 2023-01-28 PROCEDURE — 99285 EMERGENCY DEPT VISIT HI MDM: CPT | Mod: 25

## 2023-01-28 RX ORDER — ACETAMINOPHEN 500 MG
1000 TABLET ORAL
Status: COMPLETED | OUTPATIENT
Start: 2023-01-28 | End: 2023-01-28

## 2023-01-28 RX ADMIN — ACETAMINOPHEN 1000 MG: 500 TABLET, FILM COATED ORAL at 04:01

## 2023-01-28 NOTE — ED PROVIDER NOTES
Encounter Date: 1/28/2023       History     Chief Complaint   Patient presents with    Fall     Pt had a slip and fall, hitting her head. Pt had hematoma noted to her right eye.  Pt denies loc. + blood thinners. - neck or back pain.     89-year-old female with a history of CHF, colon cancer, diabetes, hypertension, atrial fibrillation presents emergency department with a slip and fall.  Patient is on Xarelto.  She says that she was cooking chicken and she heard the bus or going off and she had fallen asleep in her chair.  She stood up and fell.  She is not entirely sure if she tripped or what happened but she fell on the way to turned the bus her off.  She was unable to get up.  She did hit the right side of her face.  She did not lose consciousness.  No seizure activity.  No nausea vomiting.  She did scrape her right hand as well.  She thinks her tetanus vaccine is up-to-date.  She is complaining of pain to her left hip which is mild.  She is also complaining of pain to her right forehead.  This happened just prior to arrival.  Patient came in via EMS.    Review of patient's allergies indicates:   Allergen Reactions    Nitrofurantoin monohyd/m-cryst      leg cramps     Past Medical History:   Diagnosis Date    Atrial fibrillation, controlled     Bullous pemphigoid     CHF (congestive heart failure)     Colon cancer     Diabetes mellitus, type 2     Eczema     Hypertension     Hypothyroidism      Past Surgical History:   Procedure Laterality Date    APPENDECTOMY      BREAST SURGERY      lt breast bx    CHOLECYSTECTOMY      COLON SURGERY      EYE SURGERY       Family History   Problem Relation Age of Onset    Diabetes Mother     Heart disease Mother     Heart disease Father     Diabetes Father      Social History     Tobacco Use    Smoking status: Never    Smokeless tobacco: Never   Substance Use Topics    Alcohol use: No    Drug use: No     Review of Systems   Constitutional:  Negative for chills and fever.   HENT:   Negative for sore throat.    Respiratory:  Negative for shortness of breath.    Cardiovascular:  Negative for chest pain.   Gastrointestinal:  Negative for nausea.   Genitourinary:  Negative for dysuria and flank pain.   Musculoskeletal:  Positive for arthralgias. Negative for back pain.   Skin:  Negative for rash.   Neurological:  Positive for headaches. Negative for weakness.   Hematological:  Does not bruise/bleed easily.   All other systems reviewed and are negative.    Physical Exam     Initial Vitals   BP Pulse Resp Temp SpO2   01/28/23 1538 01/28/23 1538 01/28/23 1538 01/28/23 1602 01/28/23 1538   131/78 101 18 97.9 °F (36.6 °C) 99 %      MAP       --                Physical Exam    Nursing note and vitals reviewed.  Constitutional: She appears well-developed and well-nourished. No distress.   HENT:   Head: Normocephalic and atraumatic.   Mouth/Throat: No oropharyngeal exudate.   Eyes: Conjunctivae and EOM are normal. Pupils are equal, round, and reactive to light.   Neck: Neck supple. No tracheal deviation present.   Normal range of motion.  Cardiovascular:  Normal rate, regular rhythm, normal heart sounds and intact distal pulses.           No murmur heard.  Pulmonary/Chest: Breath sounds normal. No stridor. No respiratory distress. She has no wheezes. She has no rhonchi. She has no rales.   Abdominal: Abdomen is soft. She exhibits no distension. There is no abdominal tenderness. There is no rebound.   Musculoskeletal:         General: No tenderness or edema. Normal range of motion.      Cervical back: Normal range of motion and neck supple.      Comments: Right hand:  There is an abrasion present to the palmar aspect of the right hand.  There is ecchymosis present to the 5th metacarpal bone.  Mild tenderness present overlying this bone.    Pelvis:  Stable to pelvic rock, mild tenderness with range of motion left hip.  There is no shortening or external rotation.  Neurovascular intact distally with a  palpable pulse in the left dorsalis pedis artery.     Neurological: She is alert and oriented to person, place, and time. She has normal strength. No cranial nerve deficit or sensory deficit. GCS score is 15. GCS eye subscore is 4. GCS verbal subscore is 5. GCS motor subscore is 6.   Skin: Skin is warm and dry. Capillary refill takes less than 2 seconds. No rash noted. No erythema. No pallor.   Psychiatric: She has a normal mood and affect. Thought content normal.       ED Course   Procedures  Labs Reviewed   COMPREHENSIVE METABOLIC PANEL - Abnormal; Notable for the following components:       Result Value    CO2 31 (*)     Glucose 142 (*)     Total Bilirubin 1.4 (*)     Alkaline Phosphatase 40 (*)     Anion Gap 4 (*)     All other components within normal limits   CBC W/ AUTO DIFFERENTIAL - Abnormal; Notable for the following components:    Hemoglobin 11.9 (*)     MCHC 31.6 (*)     Lymph % 17.4 (*)     All other components within normal limits   URINALYSIS, REFLEX TO URINE CULTURE    Narrative:     Specimen Source->Urine          Imaging Results              X-Ray Pelvis 3 View inc Hip 2 view Left (Final result)  Result time 01/28/23 17:36:22      Final result by Dionte Childers MD (01/28/23 17:36:22)                   Narrative:    Reason: Trauma    FINDINGS:  AP pelvis and two views of left hip show no acute fracture, dislocation, or destructive osseous lesion.    Moderate bilateral hip osteoarthrosis is present. Pubic symphysis and sacroiliac joints are maintained. Bones are moderately diffusely demineralized, slightly limiting evaluation. Soft tissues are unremarkable.    IMPRESSION:  No acute left hip abnormality identified.    Electronically signed by:  Dionte Childers MD  1/28/2023 5:36 PM [facility] Workstation: [id number]                                     X-Ray Hand 3 view Right (Final result)  Result time 01/28/23 17:30:35      Final result by Dionte Childers MD (01/28/23 17:30:35)                   Narrative:     Reason: trauma BRUISE TO 5TH DIGIT    FINDINGS:  Three views of right hand show no fracture, dislocation, or destructive osseous lesion. On deviation of right long finger is centered at the PIP joint. Periarticular mineralization occurs at right third PIP joint. Osteoarthrosis affects the first CMC and involves PIP and DIP joints. Soft tissues otherwise unremarkable.    IMPRESSION:  No acute right hand abnormality.    Electronically signed by:  Dionte Childers MD  1/28/2023 5:30 PM [facility] Workstation: [id number]                                     CT Maxillofacial Without Contrast (Final result)  Result time 01/28/23 17:15:45      Final result by Dionte Childers MD (01/28/23 17:15:45)                   Narrative:    CMS MANDATED QUALITY DATA - CT RADIATION - 436    All CT scans at this facility utilize dose modulation, iterative reconstruction, and/or weight based dosing when appropriate to reduce radiation dose to as low as reasonably achievable.          Reason: Facial trauma, blunt    TECHNIQUE: Maxillofacial CT without IV contrast obtained with Coronal and sagittal reformations.    COMPARISON: None    FINDINGS:  Negative for facial fracture. The mandible is intact. Cervical spine degenerative changes are partially visualized.    Globes and retrobulbar soft tissues are normal. Lateral right periorbital soft tissue swelling is due to 14 mm subcutaneous hematoma. Visualized facial soft tissues are otherwise unremarkable.    Paranasal sinuses, mastoids, and middle ears are clear. Multifocal dental caries are noted.    Coronal and sagittal reformations confirm no depressed orbital floor fracture.    IMPRESSION:    1. Negative for facial fracture.  2. Lateral right periorbital soft tissue swelling due to superficial subcutaneous hematoma.    Electronically signed by:  Dionte Childers MD  1/28/2023 5:15 PM [facility] Workstation: [id number]                                     CT Cervical Spine Without Contrast (Final result)  Result  time 01/28/23 17:12:57      Final result by Dionte Childers MD (01/28/23 17:12:57)                   Narrative:    CMS MANDATED QUALITY DATA - CT RADIATION - 436    All CT scans at this facility utilize dose modulation, iterative reconstruction, and/or weight based dosing when appropriate to reduce radiation dose to as low as reasonably achievable.        Reason: Neck trauma, mechanically unstable spine (Age >= 16y)    TECHNIQUE: Cervical spine CT without IV contrast obtained with coronal and sagittal reformations.    COMPARISON:None    FINDINGS:  Negative for fracture. No epidural hematoma or prevertebral soft tissue swelling.    Cervical soft tissues are unremarkable. 8 mm oval soft tissue nodularity occurs in the left parotid gland. Visualized lung apices are clear.    At C3-C4, mild left facet joint osteoarthrosis.    At C4-C5, mild left facet joint osteoarthrosis and posterior osteophytic ridge causes mild central canal and minor left neural foramen narrowing.    At C5-C6, minor posterior osteophytic ridge without narrowing.    At C6-C7, posterior osteophytic ridge results in mild central canal and mild bilateral neural foramen narrowing.    At C7-T1, mild bilateral facet joint osteoarthrosis.    Coronal and sagittal reformations show normal alignment without abnormal facet widening.    IMPRESSION:  Cervical spine degenerative changes, without acute abnormality.    Electronically signed by:  Dionte Childers MD  1/28/2023 5:12 PM [facility] Workstation: [id number]                                     CT Head Without Contrast (Final result)  Result time 01/28/23 17:09:24      Final result by Dionte Childers MD (01/28/23 17:09:24)                   Narrative:    CMS MANDATED QUALITY DATA - CT RADIATION - 436    All CT scans at this facility utilize dose modulation, iterative reconstruction, and/or weight based dosing when appropriate to reduce radiation dose to as low as reasonably achievable.          Reason: Head trauma,  minor (Age >= 65y)    TECHNIQUE: Head CT without IV contrast.    COMPARISON: None    FINDINGS:  Gray-white differentiation is maintained without hemorrhage, midline shift, or mass effect.  Three mild periventricular white matter low-attenuation suggest chronic small vessel ischemic changes.    The ventricles and cisterns are maintained.    Calvarium is intact. Visualized sinuses are clear.    Lateral right periorbital soft tissue swelling has associated 15 mm hyperdensity superficial to the lateral rim of right orbit, and subcutaneous fat, suggesting focal hematoma.    IMPRESSION:    1. No acute intracranial abnormality.  2. Lateral right periorbital soft tissue swelling due to superficial subcutaneous hematoma.    Electronically signed by:  Dionte Childers MD  1/28/2023 5:09 PM [facility] Workstation: [id number]                                  Results for orders placed or performed during the hospital encounter of 01/28/23   Comprehensive metabolic panel   Result Value Ref Range    Sodium 136 136 - 145 mmol/L    Potassium 4.4 3.5 - 5.1 mmol/L    Chloride 101 95 - 110 mmol/L    CO2 31 (H) 23 - 29 mmol/L    Glucose 142 (H) 70 - 110 mg/dL    BUN 21 8 - 23 mg/dL    Creatinine 0.6 0.5 - 1.4 mg/dL    Calcium 9.4 8.7 - 10.5 mg/dL    Total Protein 6.8 6.0 - 8.4 g/dL    Albumin 3.8 3.5 - 5.2 g/dL    Total Bilirubin 1.4 (H) 0.1 - 1.0 mg/dL    Alkaline Phosphatase 40 (L) 55 - 135 U/L    AST 22 10 - 40 U/L    ALT 15 10 - 44 U/L    Anion Gap 4 (L) 8 - 16 mmol/L    eGFR >60.0 >60 mL/min/1.73 m^2   CBC auto differential   Result Value Ref Range    WBC 6.39 3.90 - 12.70 K/uL    RBC 4.13 4.00 - 5.40 M/uL    Hemoglobin 11.9 (L) 12.0 - 16.0 g/dL    Hematocrit 37.7 37.0 - 48.5 %    MCV 91 82 - 98 fL    MCH 28.8 27.0 - 31.0 pg    MCHC 31.6 (L) 32.0 - 36.0 g/dL    RDW 14.0 11.5 - 14.5 %    Platelets 163 150 - 450 K/uL    MPV 10.3 9.2 - 12.9 fL    Immature Granulocytes 0.3 0.0 - 0.5 %    Gran # (ANC) 4.6 1.8 - 7.7 K/uL    Immature Grans (Abs)  0.02 0.00 - 0.04 K/uL    Lymph # 1.1 1.0 - 4.8 K/uL    Mono # 0.6 0.3 - 1.0 K/uL    Eos # 0.1 0.0 - 0.5 K/uL    Baso # 0.03 0.00 - 0.20 K/uL    nRBC 0 0 /100 WBC    Gran % 71.4 38.0 - 73.0 %    Lymph % 17.4 (L) 18.0 - 48.0 %    Mono % 9.5 4.0 - 15.0 %    Eosinophil % 0.9 0.0 - 8.0 %    Basophil % 0.5 0.0 - 1.9 %    Differential Method Automated    Urinalysis, Reflex to Urine Culture Urine, Clean Catch    Specimen: Urine, Catheterized   Result Value Ref Range    Specimen UA Urine, Clean Catch     Color, UA Yellow Yellow, Straw, Micaela    Appearance, UA Clear Clear    pH, UA 6.0 5.0 - 8.0    Specific Gravity, UA 1.010 1.005 - 1.030    Protein, UA Negative Negative    Glucose, UA Negative Negative    Ketones, UA Negative Negative    Bilirubin (UA) Negative Negative    Occult Blood UA Negative Negative    Nitrite, UA Negative Negative    Urobilinogen, UA Negative Negative EU/dL    Leukocytes, UA Negative Negative          Medications   acetaminophen tablet 1,000 mg (1,000 mg Oral Given 1/28/23 1648)     Medical Decision Making:   Clinical Tests:   Lab Tests: Ordered and Reviewed  Radiological Study: Ordered and Reviewed  Medical Tests: Ordered and Reviewed  ED Management:  89-year-old female presents emergency department after a fall.  Likely a slip and fall but I did obtain blood work as patient was was not entirely clear of how she fell.  She was getting up quickly from her chair when she would fallen asleep.  Likely not fully woken up being put tripped and fell hit her face.  Given the fact that she is on Eliquis I elected to obtain CT scan of her head cervical spine and maxillofacial region.  No acute fracture/dislocation any intracranial hemorrhage found.  She had some mild pain left hip, no shortening or external rotation noted.  She had x-rays of this and her hand.  No acute fracture noted.  Urinalysis negative labs are unremarkable.  When likely mechanical type fall.  Patient will be discharged home with  supportive care, Tylenol and will follow-up with primary care provider on outpatient basis.    A dictation software program was used for this note.  Please expect some simple typographical  errors in this note.    I had a detailed discussion with the patient and/or guardian regarding: The historical points, exam findings, and diagnostic results supporting the discharge diagnosis, lab results, pertinent radiology results, and the need for outpatient follow-up, for definitive care with a family practitioner and to return to the emergency department if symptoms worsen or persist or if there are any questions or concerns that arise at home. All questions have been answered in detail. Strict return to Emergency Department precautions have been provided.                           Clinical Impression:   Final diagnoses:  [W19.XXXA] Fall, initial encounter  [S05.11XA] Periorbital contusion of right eye, initial encounter (Primary)        ED Disposition Condition    Discharge Stable          ED Prescriptions    None       Follow-up Information       Follow up With Specialties Details Why Contact Info Additional Information    Tyrel Gonzales Jr., MD Internal Medicine In 3 days  [address] [facility] [address]  [phone]       [facility] - Emergency Dept Emergency Medicine  [facility] symptoms worsen [address] [facility] [address]  [phone] 1st floor             Dominik Miller,   01/28/23 2220

## 2023-01-29 NOTE — DISCHARGE INSTRUCTIONS
RETURN TO EMERGENCY DEPARTMENT WITHOUT FAIL, IF YOUR SYMPTOMS WORSEN, IF YOU GET NEW OR DIFFERENT SYMPTOMS, IF YOU ARE UNABLE TO FOLLOW UP AS DIRECTED, OR IF YOU HAVE ANY CONCERNS OR WORRIES.  \  Follow-up with primary care provider.

## 2023-01-31 ENCOUNTER — TELEPHONE (OUTPATIENT)
Dept: FAMILY MEDICINE | Facility: CLINIC | Age: 88
End: 2023-01-31

## 2023-01-31 RX ORDER — BUSPIRONE HYDROCHLORIDE 5 MG/1
5 TABLET ORAL 2 TIMES DAILY PRN
Qty: 30 TABLET | Refills: 1 | Status: ON HOLD | OUTPATIENT
Start: 2023-01-31 | End: 2023-11-17 | Stop reason: DRUGHIGH

## 2023-01-31 NOTE — TELEPHONE ENCOUNTER
Pt called and is inquiring if she can have something low dose prescribed for anxiety. She had a bad fall and wound up in the ER this past Saturday, and hasn't been able to stop worrying.

## 2023-06-02 ENCOUNTER — PES CALL (OUTPATIENT)
Dept: ADMINISTRATIVE | Facility: CLINIC | Age: 88
End: 2023-06-02
Payer: MEDICARE

## 2023-07-17 ENCOUNTER — CLINICAL SUPPORT (OUTPATIENT)
Dept: CARDIOLOGY | Facility: HOSPITAL | Age: 88
DRG: 309 | End: 2023-07-17
Attending: INTERNAL MEDICINE
Payer: MEDICARE

## 2023-07-17 ENCOUNTER — HOSPITAL ENCOUNTER (INPATIENT)
Facility: HOSPITAL | Age: 88
LOS: 4 days | Discharge: NURSING, REHAB OR LONG-TERM CARE | DRG: 309 | End: 2023-07-21
Attending: STUDENT IN AN ORGANIZED HEALTH CARE EDUCATION/TRAINING PROGRAM | Admitting: INTERNAL MEDICINE
Payer: MEDICARE

## 2023-07-17 VITALS — BODY MASS INDEX: 16.16 KG/M2 | HEIGHT: 65 IN | WEIGHT: 97 LBS

## 2023-07-17 DIAGNOSIS — R00.1 BRADYCARDIA: ICD-10-CM

## 2023-07-17 DIAGNOSIS — I48.91 ATRIAL FIBRILLATION: ICD-10-CM

## 2023-07-17 DIAGNOSIS — I48.11 LONGSTANDING PERSISTENT ATRIAL FIBRILLATION: Primary | ICD-10-CM

## 2023-07-17 DIAGNOSIS — R07.9 CHEST PAIN: ICD-10-CM

## 2023-07-17 PROBLEM — I49.5 SICK SINUS SYNDROME: Status: ACTIVE | Noted: 2023-07-17

## 2023-07-17 PROBLEM — I42.9 CARDIOMYOPATHY: Chronic | Status: ACTIVE | Noted: 2023-07-17

## 2023-07-17 PROBLEM — F41.9 ANXIETY: Chronic | Status: ACTIVE | Noted: 2023-07-17

## 2023-07-17 PROBLEM — I34.0 MITRAL REGURGITATION: Chronic | Status: ACTIVE | Noted: 2023-07-17

## 2023-07-17 PROBLEM — R54 ADVANCED AGE: Chronic | Status: ACTIVE | Noted: 2023-07-17

## 2023-07-17 PROBLEM — Z79.01 CURRENT USE OF LONG TERM ANTICOAGULATION: Chronic | Status: ACTIVE | Noted: 2023-07-17

## 2023-07-17 PROBLEM — R60.0 PEDAL EDEMA: Chronic | Status: ACTIVE | Noted: 2023-07-17

## 2023-07-17 PROBLEM — Z99.89 WALKER AS AMBULATION AID: Chronic | Status: ACTIVE | Noted: 2023-07-17

## 2023-07-17 PROBLEM — Z85.038 HISTORY OF COLON CANCER: Chronic | Status: ACTIVE | Noted: 2023-07-17

## 2023-07-17 PROBLEM — Z66 DNR (DO NOT RESUSCITATE): Chronic | Status: ACTIVE | Noted: 2023-07-17

## 2023-07-17 LAB
ALBUMIN SERPL BCP-MCNC: 4.1 G/DL (ref 3.5–5.2)
ALP SERPL-CCNC: 44 U/L (ref 55–135)
ALT SERPL W/O P-5'-P-CCNC: 17 U/L (ref 10–44)
ANION GAP SERPL CALC-SCNC: 10 MMOL/L (ref 8–16)
ANION GAP SERPL CALC-SCNC: 7 MMOL/L (ref 8–16)
AORTIC ROOT ANNULUS: 3.2 CM
AORTIC VALVE CUSP SEPERATION: 1.5 CM
ASCENDING AORTA: 3.6 CM
AST SERPL-CCNC: 27 U/L (ref 10–40)
AV INDEX (PROSTH): 0.62
AV MEAN GRADIENT: 2 MMHG
AV PEAK GRADIENT: 4 MMHG
AV VALVE AREA: 1.95 CM2
AV VELOCITY RATIO: 0.7
BASOPHILS # BLD AUTO: 0.04 K/UL (ref 0–0.2)
BASOPHILS NFR BLD: 0.5 % (ref 0–1.9)
BILIRUB SERPL-MCNC: 1.3 MG/DL (ref 0.1–1)
BILIRUB UR QL STRIP: NEGATIVE
BNP SERPL-MCNC: 178 PG/ML (ref 0–99)
BSA FOR ECHO PROCEDURE: 1.42 M2
BUN SERPL-MCNC: 27 MG/DL (ref 8–23)
BUN SERPL-MCNC: 30 MG/DL (ref 8–23)
CALCIUM SERPL-MCNC: 9.2 MG/DL (ref 8.7–10.5)
CALCIUM SERPL-MCNC: 9.5 MG/DL (ref 8.7–10.5)
CHLORIDE SERPL-SCNC: 101 MMOL/L (ref 95–110)
CHLORIDE SERPL-SCNC: 103 MMOL/L (ref 95–110)
CLARITY UR: CLEAR
CO2 SERPL-SCNC: 26 MMOL/L (ref 23–29)
CO2 SERPL-SCNC: 29 MMOL/L (ref 23–29)
COLOR UR: YELLOW
CREAT SERPL-MCNC: 0.6 MG/DL (ref 0.5–1.4)
CREAT SERPL-MCNC: 0.8 MG/DL (ref 0.5–1.4)
CV ECHO LV RWT: 0.3 CM
D DIMER PPP IA.FEU-MCNC: 0.42 MG/L FEU
DIFFERENTIAL METHOD: ABNORMAL
DOP CALC AO PEAK VEL: 0.96 M/S
DOP CALC AO VTI: 18.8 CM
DOP CALC LVOT AREA: 3.1 CM2
DOP CALC LVOT DIAMETER: 2 CM
DOP CALC LVOT PEAK VEL: 0.67 M/S
DOP CALC LVOT STROKE VOLUME: 36.74 CM3
DOP CALC MV VTI: 22.4 CM
DOP CALCLVOT PEAK VEL VTI: 11.7 CM
E WAVE DECELERATION TIME: 151 MSEC
E/A RATIO: 3.7
E/E' RATIO: 7.69 M/S
ECHO LV POSTERIOR WALL: 0.59 CM (ref 0.6–1.1)
EJECTION FRACTION: 45 %
EOSINOPHIL # BLD AUTO: 0 K/UL (ref 0–0.5)
EOSINOPHIL NFR BLD: 0.5 % (ref 0–8)
ERYTHROCYTE [DISTWIDTH] IN BLOOD BY AUTOMATED COUNT: 14.1 % (ref 11.5–14.5)
ERYTHROCYTE [DISTWIDTH] IN BLOOD BY AUTOMATED COUNT: 14.2 % (ref 11.5–14.5)
EST. GFR  (NO RACE VARIABLE): >60 ML/MIN/1.73 M^2
EST. GFR  (NO RACE VARIABLE): >60 ML/MIN/1.73 M^2
FRACTIONAL SHORTENING: 22 % (ref 28–44)
GLUCOSE SERPL-MCNC: 136 MG/DL (ref 70–110)
GLUCOSE SERPL-MCNC: 166 MG/DL (ref 70–110)
GLUCOSE UR QL STRIP: NEGATIVE
HCT VFR BLD AUTO: 38.7 % (ref 37–48.5)
HCT VFR BLD AUTO: 41 % (ref 37–48.5)
HGB BLD-MCNC: 12.1 G/DL (ref 12–16)
HGB BLD-MCNC: 12.9 G/DL (ref 12–16)
HGB UR QL STRIP: NEGATIVE
IMM GRANULOCYTES # BLD AUTO: 0.01 K/UL (ref 0–0.04)
IMM GRANULOCYTES NFR BLD AUTO: 0.1 % (ref 0–0.5)
INTERVENTRICULAR SEPTUM: 0.88 CM (ref 0.6–1.1)
KETONES UR QL STRIP: ABNORMAL
LEFT INTERNAL DIMENSION IN SYSTOLE: 3.02 CM (ref 2.1–4)
LEFT VENTRICLE DIASTOLIC VOLUME INDEX: 45.18 ML/M2
LEFT VENTRICLE DIASTOLIC VOLUME: 65.51 ML
LEFT VENTRICLE MASS INDEX: 55 G/M2
LEFT VENTRICLE SYSTOLIC VOLUME INDEX: 24.5 ML/M2
LEFT VENTRICLE SYSTOLIC VOLUME: 35.57 ML
LEFT VENTRICULAR INTERNAL DIMENSION IN DIASTOLE: 3.89 CM (ref 3.5–6)
LEFT VENTRICULAR MASS: 79.75 G
LEUKOCYTE ESTERASE UR QL STRIP: NEGATIVE
LIPASE SERPL-CCNC: 44 U/L (ref 4–60)
LV LATERAL E/E' RATIO: 5.88 M/S
LV SEPTAL E/E' RATIO: 11.11 M/S
LVOT MG: 1 MMHG
LVOT MV: 0.44 CM/S
LYMPHOCYTES # BLD AUTO: 2.6 K/UL (ref 1–4.8)
LYMPHOCYTES NFR BLD: 31.3 % (ref 18–48)
MAGNESIUM SERPL-MCNC: 1.8 MG/DL (ref 1.6–2.6)
MAGNESIUM SERPL-MCNC: 1.9 MG/DL (ref 1.6–2.6)
MCH RBC QN AUTO: 28.1 PG (ref 27–31)
MCH RBC QN AUTO: 28.1 PG (ref 27–31)
MCHC RBC AUTO-ENTMCNC: 31.3 G/DL (ref 32–36)
MCHC RBC AUTO-ENTMCNC: 31.5 G/DL (ref 32–36)
MCV RBC AUTO: 89 FL (ref 82–98)
MCV RBC AUTO: 90 FL (ref 82–98)
MONOCYTES # BLD AUTO: 0.6 K/UL (ref 0.3–1)
MONOCYTES NFR BLD: 7.5 % (ref 4–15)
MV MEAN GRADIENT: 3 MMHG
MV PEAK A VEL: 0.27 M/S
MV PEAK E VEL: 1 M/S
MV PEAK GRADIENT: 5 MMHG
MV VALVE AREA BY CONTINUITY EQUATION: 1.64 CM2
NEUTROPHILS # BLD AUTO: 5.1 K/UL (ref 1.8–7.7)
NEUTROPHILS NFR BLD: 60.1 % (ref 38–73)
NITRITE UR QL STRIP: NEGATIVE
NRBC BLD-RTO: 0 /100 WBC
PH UR STRIP: 6 [PH] (ref 5–8)
PHOSPHATE SERPL-MCNC: 2.7 MG/DL (ref 2.7–4.5)
PISA MRMAX VEL: 3.64 M/S
PISA TR MAX VEL: 2.32 M/S
PLATELET # BLD AUTO: 157 K/UL (ref 150–450)
PLATELET # BLD AUTO: 186 K/UL (ref 150–450)
PMV BLD AUTO: 10.1 FL (ref 9.2–12.9)
PMV BLD AUTO: 10.3 FL (ref 9.2–12.9)
POTASSIUM SERPL-SCNC: 4.3 MMOL/L (ref 3.5–5.1)
POTASSIUM SERPL-SCNC: 4.7 MMOL/L (ref 3.5–5.1)
PROT SERPL-MCNC: 7.5 G/DL (ref 6–8.4)
PROT UR QL STRIP: ABNORMAL
PV MV: 0.58 M/S
PV PEAK VELOCITY: 0.88 CM/S
RA PRESSURE: 3 MMHG
RBC # BLD AUTO: 4.3 M/UL (ref 4–5.4)
RBC # BLD AUTO: 4.59 M/UL (ref 4–5.4)
SINUS: 2.89 CM
SODIUM SERPL-SCNC: 137 MMOL/L (ref 136–145)
SODIUM SERPL-SCNC: 139 MMOL/L (ref 136–145)
SP GR UR STRIP: 1.02 (ref 1–1.03)
STJ: 3.11 CM
T4 FREE SERPL-MCNC: 1.23 NG/DL (ref 0.71–1.51)
TDI LATERAL: 0.17 M/S
TDI SEPTAL: 0.09 M/S
TDI: 0.13 M/S
TR MAX PG: 22 MMHG
TRICUSPID ANNULAR PLANE SYSTOLIC EXCURSION: 1.02 CM
TROPONIN I SERPL HS-MCNC: 5 PG/ML (ref 0–14.9)
TROPONIN I SERPL HS-MCNC: 8.4 PG/ML (ref 0–14.9)
TSH SERPL DL<=0.005 MIU/L-ACNC: 13.04 UIU/ML (ref 0.34–5.6)
TV REST PULMONARY ARTERY PRESSURE: 25 MMHG
URN SPEC COLLECT METH UR: ABNORMAL
UROBILINOGEN UR STRIP-ACNC: NEGATIVE EU/DL
WBC # BLD AUTO: 7.44 K/UL (ref 3.9–12.7)
WBC # BLD AUTO: 8.43 K/UL (ref 3.9–12.7)

## 2023-07-17 PROCEDURE — 36415 COLL VENOUS BLD VENIPUNCTURE: CPT | Performed by: STUDENT IN AN ORGANIZED HEALTH CARE EDUCATION/TRAINING PROGRAM

## 2023-07-17 PROCEDURE — 25000003 PHARM REV CODE 250: Performed by: STUDENT IN AN ORGANIZED HEALTH CARE EDUCATION/TRAINING PROGRAM

## 2023-07-17 PROCEDURE — 84100 ASSAY OF PHOSPHORUS: CPT | Performed by: INTERNAL MEDICINE

## 2023-07-17 PROCEDURE — 80048 BASIC METABOLIC PNL TOTAL CA: CPT | Performed by: INTERNAL MEDICINE

## 2023-07-17 PROCEDURE — 96375 TX/PRO/DX INJ NEW DRUG ADDON: CPT

## 2023-07-17 PROCEDURE — 63600175 PHARM REV CODE 636 W HCPCS

## 2023-07-17 PROCEDURE — 85379 FIBRIN DEGRADATION QUANT: CPT | Performed by: STUDENT IN AN ORGANIZED HEALTH CARE EDUCATION/TRAINING PROGRAM

## 2023-07-17 PROCEDURE — 84484 ASSAY OF TROPONIN QUANT: CPT | Mod: 91 | Performed by: STUDENT IN AN ORGANIZED HEALTH CARE EDUCATION/TRAINING PROGRAM

## 2023-07-17 PROCEDURE — 85025 COMPLETE CBC W/AUTO DIFF WBC: CPT | Performed by: STUDENT IN AN ORGANIZED HEALTH CARE EDUCATION/TRAINING PROGRAM

## 2023-07-17 PROCEDURE — 96374 THER/PROPH/DIAG INJ IV PUSH: CPT

## 2023-07-17 PROCEDURE — 93306 ECHO (CUPID ONLY): ICD-10-PCS | Mod: 26,,, | Performed by: INTERNAL MEDICINE

## 2023-07-17 PROCEDURE — 63600175 PHARM REV CODE 636 W HCPCS: Performed by: INTERNAL MEDICINE

## 2023-07-17 PROCEDURE — 93306 TTE W/DOPPLER COMPLETE: CPT | Mod: 26,,, | Performed by: INTERNAL MEDICINE

## 2023-07-17 PROCEDURE — 84484 ASSAY OF TROPONIN QUANT: CPT | Performed by: INTERNAL MEDICINE

## 2023-07-17 PROCEDURE — 21400001 HC TELEMETRY ROOM

## 2023-07-17 PROCEDURE — 83735 ASSAY OF MAGNESIUM: CPT | Performed by: INTERNAL MEDICINE

## 2023-07-17 PROCEDURE — 93010 ELECTROCARDIOGRAM REPORT: CPT | Mod: ,,, | Performed by: INTERNAL MEDICINE

## 2023-07-17 PROCEDURE — 85027 COMPLETE CBC AUTOMATED: CPT | Performed by: INTERNAL MEDICINE

## 2023-07-17 PROCEDURE — 36415 COLL VENOUS BLD VENIPUNCTURE: CPT | Performed by: INTERNAL MEDICINE

## 2023-07-17 PROCEDURE — 93306 TTE W/DOPPLER COMPLETE: CPT

## 2023-07-17 PROCEDURE — 80053 COMPREHEN METABOLIC PANEL: CPT | Performed by: STUDENT IN AN ORGANIZED HEALTH CARE EDUCATION/TRAINING PROGRAM

## 2023-07-17 PROCEDURE — 63600175 PHARM REV CODE 636 W HCPCS: Performed by: STUDENT IN AN ORGANIZED HEALTH CARE EDUCATION/TRAINING PROGRAM

## 2023-07-17 PROCEDURE — 99900031 HC PATIENT EDUCATION (STAT)

## 2023-07-17 PROCEDURE — 84439 ASSAY OF FREE THYROXINE: CPT | Performed by: STUDENT IN AN ORGANIZED HEALTH CARE EDUCATION/TRAINING PROGRAM

## 2023-07-17 PROCEDURE — 93010 EKG 12-LEAD: ICD-10-PCS | Mod: ,,, | Performed by: INTERNAL MEDICINE

## 2023-07-17 PROCEDURE — 99285 EMERGENCY DEPT VISIT HI MDM: CPT | Mod: 25

## 2023-07-17 PROCEDURE — 93005 ELECTROCARDIOGRAM TRACING: CPT | Performed by: INTERNAL MEDICINE

## 2023-07-17 PROCEDURE — 83880 ASSAY OF NATRIURETIC PEPTIDE: CPT | Performed by: STUDENT IN AN ORGANIZED HEALTH CARE EDUCATION/TRAINING PROGRAM

## 2023-07-17 PROCEDURE — 83690 ASSAY OF LIPASE: CPT | Performed by: STUDENT IN AN ORGANIZED HEALTH CARE EDUCATION/TRAINING PROGRAM

## 2023-07-17 PROCEDURE — 94761 N-INVAS EAR/PLS OXIMETRY MLT: CPT

## 2023-07-17 PROCEDURE — 83735 ASSAY OF MAGNESIUM: CPT | Mod: 91 | Performed by: STUDENT IN AN ORGANIZED HEALTH CARE EDUCATION/TRAINING PROGRAM

## 2023-07-17 PROCEDURE — 84443 ASSAY THYROID STIM HORMONE: CPT | Performed by: STUDENT IN AN ORGANIZED HEALTH CARE EDUCATION/TRAINING PROGRAM

## 2023-07-17 PROCEDURE — 25000003 PHARM REV CODE 250: Performed by: INTERNAL MEDICINE

## 2023-07-17 PROCEDURE — 81003 URINALYSIS AUTO W/O SCOPE: CPT | Performed by: STUDENT IN AN ORGANIZED HEALTH CARE EDUCATION/TRAINING PROGRAM

## 2023-07-17 RX ORDER — EPINEPHRINE 0.1 MG/ML
INJECTION INTRAVENOUS
Status: COMPLETED
Start: 2023-07-17 | End: 2023-07-17

## 2023-07-17 RX ORDER — ENOXAPARIN SODIUM 100 MG/ML
30 INJECTION SUBCUTANEOUS EVERY 24 HOURS
Status: DISCONTINUED | OUTPATIENT
Start: 2023-07-18 | End: 2023-07-17

## 2023-07-17 RX ORDER — LANOLIN ALCOHOL/MO/W.PET/CERES
800 CREAM (GRAM) TOPICAL
Status: DISCONTINUED | OUTPATIENT
Start: 2023-07-17 | End: 2023-07-21 | Stop reason: HOSPADM

## 2023-07-17 RX ORDER — METOPROLOL TARTRATE 1 MG/ML
5 INJECTION, SOLUTION INTRAVENOUS
Status: COMPLETED | OUTPATIENT
Start: 2023-07-17 | End: 2023-07-17

## 2023-07-17 RX ORDER — EPINEPHRINE 0.1 MG/ML
0.1 INJECTION INTRAVENOUS ONCE
Status: DISCONTINUED | OUTPATIENT
Start: 2023-07-17 | End: 2023-07-17

## 2023-07-17 RX ORDER — METOPROLOL SUCCINATE 50 MG/1
75 TABLET, EXTENDED RELEASE ORAL DAILY
Status: ON HOLD | COMMUNITY
End: 2023-07-20 | Stop reason: HOSPADM

## 2023-07-17 RX ORDER — AMOXICILLIN 250 MG
2 CAPSULE ORAL 2 TIMES DAILY PRN
Status: DISCONTINUED | OUTPATIENT
Start: 2023-07-17 | End: 2023-07-21 | Stop reason: HOSPADM

## 2023-07-17 RX ORDER — SODIUM,POTASSIUM PHOSPHATES 280-250MG
2 POWDER IN PACKET (EA) ORAL
Status: DISCONTINUED | OUTPATIENT
Start: 2023-07-17 | End: 2023-07-21 | Stop reason: HOSPADM

## 2023-07-17 RX ORDER — IBUPROFEN 200 MG
16 TABLET ORAL
Status: DISCONTINUED | OUTPATIENT
Start: 2023-07-17 | End: 2023-07-21 | Stop reason: HOSPADM

## 2023-07-17 RX ORDER — IBUPROFEN 200 MG
24 TABLET ORAL
Status: DISCONTINUED | OUTPATIENT
Start: 2023-07-17 | End: 2023-07-21 | Stop reason: HOSPADM

## 2023-07-17 RX ORDER — SODIUM CHLORIDE 0.9 % (FLUSH) 0.9 %
10 SYRINGE (ML) INJECTION EVERY 6 HOURS PRN
Status: DISCONTINUED | OUTPATIENT
Start: 2023-07-17 | End: 2023-07-21 | Stop reason: HOSPADM

## 2023-07-17 RX ORDER — ATROPINE SULFATE 0.1 MG/ML
1 INJECTION INTRAVENOUS ONCE
Status: COMPLETED | OUTPATIENT
Start: 2023-07-17 | End: 2023-07-17

## 2023-07-17 RX ORDER — ENOXAPARIN SODIUM 100 MG/ML
40 INJECTION SUBCUTANEOUS EVERY 24 HOURS
Status: DISCONTINUED | OUTPATIENT
Start: 2023-07-18 | End: 2023-07-20

## 2023-07-17 RX ORDER — ENOXAPARIN SODIUM 100 MG/ML
1 INJECTION SUBCUTANEOUS EVERY 12 HOURS
Status: DISCONTINUED | OUTPATIENT
Start: 2023-07-17 | End: 2023-07-17

## 2023-07-17 RX ORDER — GLUCAGON 1 MG
1 KIT INJECTION
Status: DISCONTINUED | OUTPATIENT
Start: 2023-07-17 | End: 2023-07-21 | Stop reason: HOSPADM

## 2023-07-17 RX ORDER — BUSPIRONE HYDROCHLORIDE 5 MG/1
5 TABLET ORAL 2 TIMES DAILY PRN
Status: DISCONTINUED | OUTPATIENT
Start: 2023-07-17 | End: 2023-07-21 | Stop reason: HOSPADM

## 2023-07-17 RX ORDER — MAGNESIUM SULFATE 1 G/100ML
1 INJECTION INTRAVENOUS ONCE
Status: COMPLETED | OUTPATIENT
Start: 2023-07-17 | End: 2023-07-17

## 2023-07-17 RX ORDER — ATROPINE SULFATE 0.1 MG/ML
INJECTION INTRAVENOUS
Status: COMPLETED
Start: 2023-07-17 | End: 2023-07-17

## 2023-07-17 RX ORDER — FENTANYL CITRATE 50 UG/ML
25 INJECTION, SOLUTION INTRAMUSCULAR; INTRAVENOUS
Status: COMPLETED | OUTPATIENT
Start: 2023-07-17 | End: 2023-07-17

## 2023-07-17 RX ORDER — METOPROLOL SUCCINATE 25 MG/1
50 TABLET, EXTENDED RELEASE ORAL ONCE
Status: DISCONTINUED | OUTPATIENT
Start: 2023-07-17 | End: 2023-07-17

## 2023-07-17 RX ORDER — FLUOCINOLONE ACETONIDE 0.11 MG/ML
5 OIL AURICULAR (OTIC) 2 TIMES DAILY PRN
COMMUNITY
Start: 2023-06-22

## 2023-07-17 RX ORDER — ONDANSETRON 2 MG/ML
8 INJECTION INTRAMUSCULAR; INTRAVENOUS
Status: COMPLETED | OUTPATIENT
Start: 2023-07-17 | End: 2023-07-17

## 2023-07-17 RX ORDER — ENOXAPARIN SODIUM 100 MG/ML
1 INJECTION SUBCUTANEOUS EVERY 24 HOURS
Status: DISCONTINUED | OUTPATIENT
Start: 2023-07-18 | End: 2023-07-17

## 2023-07-17 RX ORDER — FUROSEMIDE 20 MG/1
20 TABLET ORAL DAILY
Status: ON HOLD | COMMUNITY
Start: 2023-02-20 | End: 2023-07-20 | Stop reason: SDUPTHER

## 2023-07-17 RX ORDER — FENTANYL CITRATE 50 UG/ML
INJECTION, SOLUTION INTRAMUSCULAR; INTRAVENOUS
Status: COMPLETED
Start: 2023-07-17 | End: 2023-07-17

## 2023-07-17 RX ORDER — NALOXONE HCL 0.4 MG/ML
0.02 VIAL (ML) INJECTION
Status: DISCONTINUED | OUTPATIENT
Start: 2023-07-17 | End: 2023-07-21 | Stop reason: HOSPADM

## 2023-07-17 RX ORDER — MUPIROCIN 20 MG/G
OINTMENT TOPICAL 2 TIMES DAILY
Status: DISCONTINUED | OUTPATIENT
Start: 2023-07-17 | End: 2023-07-20

## 2023-07-17 RX ORDER — ACETAMINOPHEN 325 MG/1
650 TABLET ORAL EVERY 4 HOURS PRN
Status: DISCONTINUED | OUTPATIENT
Start: 2023-07-17 | End: 2023-07-21 | Stop reason: HOSPADM

## 2023-07-17 RX ORDER — ONDANSETRON 2 MG/ML
4 INJECTION INTRAMUSCULAR; INTRAVENOUS EVERY 8 HOURS PRN
Status: DISCONTINUED | OUTPATIENT
Start: 2023-07-17 | End: 2023-07-21 | Stop reason: HOSPADM

## 2023-07-17 RX ADMIN — FENTANYL CITRATE 25 MCG: 50 INJECTION, SOLUTION INTRAMUSCULAR; INTRAVENOUS at 02:07

## 2023-07-17 RX ADMIN — ENOXAPARIN SODIUM 40 MG: 100 INJECTION SUBCUTANEOUS at 08:07

## 2023-07-17 RX ADMIN — SODIUM CHLORIDE, SODIUM LACTATE, POTASSIUM CHLORIDE, AND CALCIUM CHLORIDE 500 ML: .6; .31; .03; .02 INJECTION, SOLUTION INTRAVENOUS at 02:07

## 2023-07-17 RX ADMIN — METOPROLOL TARTRATE 5 MG: 1 INJECTION, SOLUTION INTRAVENOUS at 01:07

## 2023-07-17 RX ADMIN — BUSPIRONE HYDROCHLORIDE 5 MG: 5 TABLET ORAL at 09:07

## 2023-07-17 RX ADMIN — MAGNESIUM SULFATE 1 G: 1 INJECTION INTRAVENOUS at 06:07

## 2023-07-17 RX ADMIN — ONDANSETRON 8 MG: 2 INJECTION INTRAMUSCULAR; INTRAVENOUS at 01:07

## 2023-07-17 RX ADMIN — ATROPINE SULFATE 1 MG: 0.1 INJECTION INTRAVENOUS at 02:07

## 2023-07-17 RX ADMIN — EPINEPHRINE 0.01 MG: 0.1 INJECTION INTRAVENOUS at 02:07

## 2023-07-17 RX ADMIN — MUPIROCIN 1 G: 20 OINTMENT TOPICAL at 09:07

## 2023-07-17 RX ADMIN — MUPIROCIN 1 G: 20 OINTMENT TOPICAL at 08:07

## 2023-07-17 NOTE — PROGRESS NOTES
Echocardiogram noted, will await Cardiology recommendations.  She is in sinus rhythm with a regular rate at this time.  Transfer out of ICU.

## 2023-07-17 NOTE — PHARMACY MED REC
"      Admission Medication History     The home medication history was taken by Nae Sargent.    You may go to "Admission" then "Reconcile Home Medications" tabs to review and/or act upon these items.     The home medication list has been updated by the Pharmacy department.   Please read ALL comments highlighted in yellow.   Please address this information as you see fit.    Feel free to contact us if you have any questions or require assistance.      The medications listed below were removed from the home medication list. Please reorder if appropriate:  Patient reports no longer taking the following medication(s):  Toprol 50mg 1 tab QD-dose adjustment  Xarelto- dose adjustment        Medications listed below were obtained from: Patient/family and Analytic software- [employer]  No current facility-administered medications on file prior to encounter.     Current Outpatient Medications on File Prior to Encounter   Medication Sig Dispense Refill    bisacodyl (DULCOLAX) 5 mg EC tablet Take 5 mg by mouth daily as needed for Constipation.      metoprolol succinate (TOPROL-XL) 50 MG 24 hr tablet Take 75 mg by mouth once daily.      busPIRone (BUSPAR) 5 MG Tab Take 1 tablet (5 mg total) by mouth 2 (two) times daily as needed (anxiety). (Patient taking differently: Take 5 mg by mouth 2 (two) times daily as needed (anxiety). NEW Rx - NOT YET STARTED) 30 tablet 1    fluocinolone acetonide oiL 0.01 % Drop Place 5 drops in ear(s) 2 (two) times daily as needed.      furosemide (LASIX) 20 MG tablet Take 20 mg by mouth once daily.      rivaroxaban (XARELTO) 15 mg Tab Take 15 mg by mouth 2 (two) times a day.      [DISCONTINUED] metoprolol succinate (TOPROL-XL) 50 MG 24 hr tablet Take 50 mg by mouth nightly.      [DISCONTINUED] rivaroxaban (XARELTO) 20 mg Tab Take 1 tablet (20 mg total) by mouth daily with dinner or evening meal. 30 tablet 0         Nae Sargent  [id number]            .        "

## 2023-07-17 NOTE — ED PROVIDER NOTES
Encounter Date: 7/17/2023       History     Chief Complaint   Patient presents with    Palpitations    Shortness of Breath    Abdominal Pain     89-year-old female presents for palpitations weakness and shortness of breath.  She is been feeling bad for the past day.  Her son reports she was able to walk with a walker as she does normally this afternoon.  This evening, the patient could not fall asleep because she was feeling anxious, and then called her son to have him bring her to the hospital.  She told him she does not think she has long to live.  On arrival here she denies any pain but reports weakness and palpitations.  She has a history of AFib on metoprolol, anticoagulation on Xarelto, anxiety on BuSpar, constipation on Dulcolax.    Review of patient's allergies indicates:   Allergen Reactions    Nitrofurantoin monohyd/m-cryst      leg cramps     Past Medical History:   Diagnosis Date    Atrial fibrillation, controlled     Bullous pemphigoid     CHF (congestive heart failure)     Colon cancer     Diabetes mellitus, type 2     Eczema     Hypertension     Hypothyroidism      Past Surgical History:   Procedure Laterality Date    APPENDECTOMY      BREAST SURGERY      lt breast bx    CHOLECYSTECTOMY      COLON SURGERY      EYE SURGERY       Family History   Problem Relation Age of Onset    Diabetes Mother     Heart disease Mother     Heart disease Father     Diabetes Father      Social History     Tobacco Use    Smoking status: Never    Smokeless tobacco: Never   Substance Use Topics    Alcohol use: No    Drug use: No     Review of Systems   Constitutional:  Negative for activity change, appetite change, chills, fever and unexpected weight change.   HENT:  Negative for dental problem and drooling.    Eyes:  Negative for discharge and itching.   Respiratory:  Negative for cough, chest tightness, shortness of breath, wheezing and stridor.    Cardiovascular:  Negative for chest pain, palpitations and leg swelling.    Gastrointestinal:  Negative for abdominal distention, abdominal pain, diarrhea and nausea.   Genitourinary:  Negative for difficulty urinating, dysuria, frequency and urgency.   Musculoskeletal:  Negative for back pain, gait problem and joint swelling.   Neurological:  Positive for weakness. Negative for dizziness, syncope, numbness and headaches.   Psychiatric/Behavioral:  Negative for agitation, behavioral problems and confusion.      Physical Exam     Initial Vitals [07/17/23 0118]   BP Pulse Resp Temp SpO2   (!) 148/110 (!) 122 (!) 32 97.8 °F (36.6 °C) 99 %      MAP       --         Physical Exam    Constitutional: She appears well-nourished. She appears distressed.   Cachectic, chronically ill-appearing   Eyes: EOM are normal. Pupils are equal, round, and reactive to light.   Neck: No thyromegaly present. No tracheal deviation present.   Normal range of motion.  Cardiovascular:      Exam reveals no friction rub.       No murmur heard.  Tachycardic   Pulmonary/Chest: She has no wheezes. She has no rales.   Abdominal: She exhibits no distension. There is no abdominal tenderness. There is no rebound.   Musculoskeletal:         General: No tenderness. Normal range of motion.      Cervical back: Normal range of motion.     Neurological: She is alert. She has normal strength. She displays normal reflexes. No cranial nerve deficit.   Skin: Skin is warm and dry.       ED Course   Critical Care    Date/Time: 7/17/2023 3:07 AM  Performed by: Compa Jovel MD  Authorized by: Compa Jovel MD   Direct patient critical care time: 30 minutes  Additional history critical care time: 5 minutes  Ordering / reviewing critical care time: 10 minutes  Documentation critical care time: 10 minutes  Consulting other physicians critical care time: 10 minutes  Consult with family critical care time: 15 minutes  Total critical care time (exclusive of procedural time) : 80 minutes  Critical care was necessary to treat or  prevent imminent or life-threatening deterioration of the following conditions: cardiac failure and shock.  Critical care was time spent personally by me on the following activities: discussions with consultants, development of treatment plan with patient or surrogate, evaluation of patient's response to treatment, interpretation of cardiac output measurements, examination of patient, obtaining history from patient or surrogate, ordering and performing treatments and interventions, ordering and review of laboratory studies, pulse oximetry, ordering and review of radiographic studies and re-evaluation of patient's condition.      Labs Reviewed   CBC W/ AUTO DIFFERENTIAL - Abnormal; Notable for the following components:       Result Value    MCHC 31.5 (*)     All other components within normal limits   COMPREHENSIVE METABOLIC PANEL - Abnormal; Notable for the following components:    Glucose 136 (*)     BUN 30 (*)     Total Bilirubin 1.3 (*)     Alkaline Phosphatase 44 (*)     All other components within normal limits   B-TYPE NATRIURETIC PEPTIDE - Abnormal; Notable for the following components:     (*)     All other components within normal limits   URINALYSIS, REFLEX TO URINE CULTURE - Abnormal; Notable for the following components:    Protein, UA Trace (*)     Ketones, UA 1+ (*)     All other components within normal limits    Narrative:     Specimen Source->Urine   MAGNESIUM   TROPONIN I HIGH SENSITIVITY   D DIMER, QUANTITATIVE   LIPASE     EKG Readings: (Independently Interpreted)   Initial EKG with AFib with RVR, heart rate 140, no acute ST elevations or depressions.     Imaging Results              X-Ray Chest AP Portable (In process)                      Medications   lactated ringers bolus 500 mL (500 mLs Intravenous New Bag 7/17/23 0246)   ondansetron injection 8 mg (8 mg Intravenous Given 7/17/23 0128)   metoprolol injection 5 mg (5 mg Intravenous Given 7/17/23 0136)   fentaNYL 50 mcg/mL injection 25  "mcg (25 mcg Intravenous Given 7/17/23 0200)   EPINEPHrine (ADRENALIN) 0.1 mg/mL injection (0.01 mg  Given 7/17/23 0226)   atropine injection 1 mg (1 mg Intravenous Given 7/17/23 0215)   fentaNYL 50 mcg/mL injection 25 mcg (25 mcg Intravenous Given 7/17/23 0228)   fentaNYL 50 mcg/mL injection 25 mcg (25 mcg Intravenous Given 7/17/23 0215)                 ED Course as of 07/17/23 0309 Mon Jul 17, 2023   0150 Patient presents for weakness, history of AFib with RVR, vitals initially notable for normal blood pressure, heart rate of 121 40, other vitals within normal limits.  EKG shows AFib with RVR.  Given chronic AFib and chronic use of metoprolol will attempt 5 mg IV metoprolol for rate control [BS]   0158 Patient given 5 mg IV metoprolol, heart rate improved from 140-70 but then subsequently continued to decrease to heart rate of 5.  Patient briefly became obtunded, minimally responsive.  She never lost a pulse.  The symptoms lasted about 3 minutes.  Patient placed in trauma room, cardiac pads placed, patient became more awake and arousable, heart rate now in the 60s with normal blood pressure, normal mentation. [BS]   0215 Patient had brief episode of sinus pause versus asystole, lasting 5-10 seconds, with patient being obtunded, minimally responsive and awakening stating "I had a bad dream". [BS]   0230 Patient on pacer pads, rate set at 30, patient having frequent episodes of bradycardia requiring pacing.  Patient given atropine with no response.  Patient subsequently given epinephrine with heart rate returning to 130s. [BS]   0306 Case discussed with cardiology who recommended temporary pacemaker.  When discussed with family, family reports the patient has refused pacemaker on multiple occasions.  Patient's son does not think the patient wants chest compressions or intubation.  The patient agrees Patient made do not resuscitate [BS]   0309 Urinalysis, Reflex to Urine Culture Urine, Clean Catch(!) [BS]   0309 " B-Type natriuretic peptide (BNP)(!) [BS]   0309 Troponin I High Sensitivity #1 [BS]   0309 Comprehensive metabolic panel(!) [BS]   0309 Magnesium [BS]   0309 Lipase [BS]   0309 D dimer, quantitative [BS]   0309 CBC auto differential(!) [BS]      ED Course User Index  [BS] Compa Jovel MD                 Clinical Impression:   Final diagnoses:  [R07.9] Chest pain  [R00.1] Bradycardia  [I48.11] Longstanding persistent atrial fibrillation (Primary)               Compa Jovel MD  07/17/23 0304

## 2023-07-17 NOTE — ED NOTES
PT NOTED TO BE ASYSTOLE AT THE BS. RN WITNESSED RHYTHM. MD NOTIFIED. PT NOT ALERT. HR NOTED TO BE 30 AT ABOUT 10 SEC WITHOUT COMPRESSIONS. PT PLACED ON PADS.

## 2023-07-17 NOTE — H&P
[facility] - Emergency Dept  Hospital Medicine  History & Physical    Patient Name: Jennifer Alarcon  MRN: [id number]  Patient Class: IP- Inpatient  Admission Date: 7/17/2023  Attending Physician: Leslie Castellanos MD   Primary Care Provider: Tyrel Gonzales Jr, MD         Patient information was obtained from patient, relative(s), past medical records and ER records.     Subjective:     Principal Problem:<principal problem not specified>    Chief Complaint:   Chief Complaint   Patient presents with    Palpitations    Shortness of Breath    Abdominal Pain        HPI: Ms. Alarcon is a 89-years-old female who presented to the ED due to generalized weakness associated with palpitation.  Patient states she went to sleep at around 11:00 p.m., noted pounding sensation in her back, rapid, generally weak, possibly short of breath. Initially in the ED on arrival had nausea with episode of emesis.  Patient with known history of chronic atrial fibrillation, on metoprolol and Xarelto, seen by cardiologist Dr. Horan in the past.  Has chronic pedal edema.  Ambulates with walker baseline.  In the past she has declined permanent pacemaker placement.  Initially in the ED she was found to be in atrial fibrillation with RVR with heart rate 120-140, she was given 5 mg Lopressor with improvement in heart rate but then slowly declining heart rate with severe bradycardia with prolonged pauses, with same she became obtunded, as per ED provider about 3 minutes, external pacer was placed, she was given atropine, epinephrine and fluids.  ED provider discussed with on-call cardiologist, Dr. Thomas, recommended temporary pacemaker placement. Patient extremely anxious and as per ED provider requesting to speak to  as she believes she is about to die, reportedly was stating did not want external pacer as she is extremely sensitive to pain.  Code status addressed and confirmed DNR.   subsequently visited patient with  family members present at bedside including two sons.  Case discussed with ED provider who requested admission.  Discussed with patient and family members present at bedside, currently she is in Afib with RVR with heart rate 130, clarified she is DNR, at this time decision to keep pacer pads in place, if needed she may decide to remove at that time, she is concerned removal at this time will be suicide and wishes to discuss further with the . She is alert and oriented and remains anxious at bedside.       Past Medical History:   Diagnosis Date    Atrial fibrillation, controlled     Bullous pemphigoid     CHF (congestive heart failure)     Colon cancer     Diabetes mellitus, type 2     Eczema     Hypertension     Hypothyroidism        Past Surgical History:   Procedure Laterality Date    APPENDECTOMY      BREAST SURGERY      lt breast bx    CHOLECYSTECTOMY      COLON SURGERY      EYE SURGERY         Review of patient's allergies indicates:   Allergen Reactions    Nitrofurantoin monohyd/m-cryst      leg cramps       No current facility-administered medications on file prior to encounter.     Current Outpatient Medications on File Prior to Encounter   Medication Sig    bisacodyl (DULCOLAX) 5 mg EC tablet Take 5 mg by mouth daily as needed for Constipation.    busPIRone (BUSPAR) 5 MG Tab Take 1 tablet (5 mg total) by mouth 2 (two) times daily as needed (anxiety).    metoprolol succinate (TOPROL-XL) 50 MG 24 hr tablet Take 50 mg by mouth nightly.    rivaroxaban (XARELTO) 20 mg Tab Take 1 tablet (20 mg total) by mouth daily with dinner or evening meal.     Family History       Problem Relation (Age of Onset)    Diabetes Mother, Father    Heart disease Mother, Father          Tobacco Use    Smoking status: Never    Smokeless tobacco: Never   Substance and Sexual Activity    Alcohol use: No    Drug use: No    Sexual activity: Never     Review of Systems   Constitutional:  Negative for chills and fever.   HENT:           Hearing loss   Respiratory:  Positive for shortness of breath.    Cardiovascular:  Positive for chest pain and palpitations.        Feet swelling   Gastrointestinal:  Negative for abdominal pain, constipation and diarrhea.   Genitourinary:  Negative for dysuria and frequency.   Neurological:  Positive for weakness (generalized).   Psychiatric/Behavioral:  The patient is nervous/anxious.    Objective:     Vital Signs (Most Recent):  Temp: 97.8 °F (36.6 °C) (07/17/23 0118)  Pulse: (!) 127 (07/17/23 0330)  Resp: (!) 25 (07/17/23 0330)  BP: (!) 146/70 (07/17/23 0330)  SpO2: 100 % (07/17/23 0330) Vital Signs (24h Range):  Temp:  [97.8 °F (36.6 °C)] 97.8 °F (36.6 °C)  Pulse:  [] 127  Resp:  [22-42] 25  SpO2:  [99 %-100 %] 100 %  BP: (116-148)/() 146/70     Weight: 44 kg (97 lb)  Body mass index is 16.14 kg/m².     Physical Exam  Vitals and nursing note reviewed.   Constitutional:       Comments: Elderly frail chronically ill appearing female, lying in stretcher   HENT:      Head: Normocephalic and atraumatic.      Mouth/Throat:      Mouth: Mucous membranes are moist.      Comments: Missing dentitions  Eyes:      General:         Right eye: No discharge.         Left eye: No discharge.      Conjunctiva/sclera: Conjunctivae normal.   Cardiovascular:      Rate and Rhythm: Tachycardia present. Rhythm irregular.      Comments: Bilateral pedal edema  Pulmonary:      Effort: No respiratory distress.      Breath sounds: No stridor. No wheezing.      Comments: On NC  Abdominal:      General: Bowel sounds are normal. There is no distension.      Palpations: Abdomen is soft.      Tenderness: There is no abdominal tenderness. There is no guarding.   Genitourinary:     Comments: Wearing diaper   Musculoskeletal:      Comments: Evidence of cachexia with bitemporal muscle wasting, low BMI   Skin:     General: Skin is warm.   Neurological:      Mental Status: She is alert.      Comments: Hard of hearing, AO X 3, no  aphasia/dysarthria, moving all 4 extremities, generalized weakness   Psychiatric:      Comments: Anxious appearing              Significant Labs: Blood Culture: No results for input(s): LABBLOO in the last 48 hours.  BMP:   Recent Labs   Lab 07/17/23  0129   *      K 4.3      CO2 26   BUN 30*   CREATININE 0.8   CALCIUM 9.5   MG 1.9     CBC:   Recent Labs   Lab 07/17/23  0129   WBC 8.43   HGB 12.9   HCT 41.0        CMP:   Recent Labs   Lab 07/17/23  0129      K 4.3      CO2 26   *   BUN 30*   CREATININE 0.8   CALCIUM 9.5   PROT 7.5   ALBUMIN 4.1   BILITOT 1.3*   ALKPHOS 44*   AST 27   ALT 17   ANIONGAP 10     Cardiac Markers:   Recent Labs   Lab 07/17/23  0129   *     Lactic Acid: No results for input(s): LACTATE in the last 48 hours.  Magnesium:   Recent Labs   Lab 07/17/23  0129   MG 1.9     POCT Glucose: No results for input(s): POCTGLUCOSE in the last 48 hours.  Respiratory Culture: No results for input(s): GSRESP, RESPIRATORYC in the last 48 hours.  Troponin:   Recent Labs   Lab 07/17/23  0129   TROPONINIHS 5.0     TSH: No results for input(s): TSH in the last 4320 hours.  Urine Culture: No results for input(s): LABURIN in the last 48 hours.  Urine Studies:   Recent Labs   Lab 07/17/23  0151   COLORU Yellow   APPEARANCEUA Clear   PHUR 6.0   SPECGRAV 1.020   PROTEINUA Trace*   GLUCUA Negative   KETONESU 1+*   BILIRUBINUA Negative   OCCULTUA Negative   NITRITE Negative   UROBILINOGEN Negative   LEUKOCYTESUR Negative       Significant Imaging: I have reviewed all pertinent imaging results/findings within the past 24 hours.    No results found.    Echocardiogram  Summary    Decreased left ventricular systolic function. The estimated ejection fraction is 40%  Local segmental wall motion abnormalities.  Atrial fibrillation observed.  Mild left atrial enlargement.  Mild right atrial enlargement.  Mild-to-moderate mitral regurgitation.  Mild pulmonic  regurgitation.  Normal central venous pressure (3 mm Hg).  The estimated PA systolic pressure is 32 mm Hg  There is a left pleural effusion.       Assessment/Plan:     Active Hospital Problems    Diagnosis    Sick sinus syndrome    Atrial fibrillation with RVR    Advanced age    Anxiety    DNR (do not resuscitate)    Pedal edema    Mitral regurgitation, moderate     Cardiomyopathy    Current use of long term anticoagulation    Walker as ambulation aid    History of colon cancer     Plan:  Admit inpatient, ICU, continuous cardiac telemetry monitoring  Following extensive discussion with patient and family, decision to keep pacer pads in place, if needed patient may decide removal   Avoid  AV robson agent, allow Afib with RVR, given severe bradycardia with prolonged pauses and concern for sick sinus   Check TSH, 1 g IV magnesium supplementation  If recurrent severe bradycardia with pauses may consider trial of glucagon vs dopamine  Echo 2019 EF of 40% with mild to moderate mitral regurgitation, repeat echo ordered  Hold Xarelto, Lovenox 1mg/kg bid in setting patient changes her decision regarding PPM placement  Fall and delirium precautions  Electrolytes sliding scale repletion  A.m. labs ordered  Cardiology consulted   Further plan as per hospital course      VTE Risk Mitigation (From admission, onward)           Ordered     enoxaparin injection 40 mg  Every 12 hours         07/17/23 0349     IP VTE HIGH RISK PATIENT  Once         07/17/23 0349     Place sequential compression device  Until discontinued         07/17/23 0349                               Leslie Castellanos MD  Department of Hospital Medicine  Formerly [facility] - Emergency Dept

## 2023-07-17 NOTE — CARE UPDATE
07/17/23 0443   Patient Assessment/Suction   Level of Consciousness (AVPU) alert   Respiratory Effort Unlabored   Expansion/Accessory Muscles/Retractions expansion symmetric   All Lung Fields Breath Sounds clear   Rhythm/Pattern, Respiratory depth regular;pattern regular   Cough Frequency infrequent   Cough Type good;nonproductive   PRE-TX-O2   Device (Oxygen Therapy) room air   Pulse Oximetry Type Continuous   $ Pulse Oximetry - Multiple Charge Pulse Oximetry - Multiple   Pulse 78   Education   $ Education DME Oxygen;15 min

## 2023-07-17 NOTE — CARE UPDATE
ZAY followed-up with [facility] regarding group home placement. Margarita at [facility] requested referral packet for review and packet sent via [employer]. Margarita gave her cell contact number to ZAY: [phone]. ZAY will continue to follow.

## 2023-07-17 NOTE — NURSING
Report called to RN taking pt in room 2500. Sister at bedside and will ambulate with pt who is in a WC.

## 2023-07-17 NOTE — PLAN OF CARE
Problem: Adult Inpatient Plan of Care  Goal: Plan of Care Review  Outcome: Ongoing, Progressing  Goal: Absence of Hospital-Acquired Illness or Injury  Outcome: Ongoing, Progressing  Goal: Readiness for Transition of Care  Outcome: Ongoing, Progressing     Problem: Skin Injury Risk Increased  Goal: Skin Health and Integrity  Outcome: Ongoing, Progressing     Problem: Dysrhythmia  Goal: Normalized Cardiac Rhythm  Outcome: Ongoing, Progressing   Pt sits on side of bed for meals. Ate 50% this evening. Sister at bedside at present. Both sons have come to see pt. Continues to be in a-fib. Consult to case management for NH placement after discharge per pt and family request.

## 2023-07-17 NOTE — PLAN OF CARE
[facility]  Initial Discharge Assessment       Primary Care Provider: Tyrel Gonzales Jr, MD    Admission Diagnosis: Longstanding persistent atrial fibrillation [I48.11]    Admission Date: 7/17/2023  Expected Discharge Date:     Transition of Care Barriers: None     met with Pt, Pt's son, and Pt's sister at bedside to complete discharge assessment. Pt AAOx4s. Demographics, PCP, and insurance verified. No home health. No dialysis. Pt reports ability to complete ADLs with assistance of DME. DME listed below. Pt and family verbalized plan to discharge to a penitentiary facility pending placement at [facility]. Pt's son has started the process with [facility] and verbalized Pt is second on the waitlist there. SW will follow-up with [facility] regarding placement. Pt has no other needs to be addressed at this time. CM will continue to follow.     Payor: MEDICARE / Plan: MEDICARE PART A & B / Product Type: Government /     Extended Emergency Contact Information  Primary Emergency Contact: Kevin Alarcon Jr.  Address: [address] [place] [address] [id number] [place] [address]  Home Phone: [phone]  Mobile Phone: [phone]  Relation: Son  Preferred language: English   needed? No  Secondary Emergency Contact: Dinh Alarcon  Address: [address] [place] [address] [id number] [place] [address]  Home Phone: [phone]  Mobile Phone: [phone]  Relation: Son  Preferred language: English   needed? No    Discharge Plan A: New Nursing Home placement - penitentiary care facility  Discharge Plan B: New Nursing Home placement - penitentiary care facility      [id number] - [place], [place] - [address] [facility]  [address]  [facility] [address]  Phone: [phone] Fax: [phone]      Initial Assessment (most recent)       Adult Discharge Assessment - 07/17/23 1154          Discharge Assessment     Assessment Type Discharge Planning Assessment     Confirmed/corrected address, phone number and insurance Yes     Confirmed Demographics Correct on Facesheet     Source of Information patient;family     Communicated JASMYNE with patient/caregiver Date not available/Unable to determine     People in Home alone     Facility Arrived From: home     Do you expect to return to your current living situation? No     Do you have help at home or someone to help you manage your care at home? No     Prior to hospitilization cognitive status: Alert/Oriented     Current cognitive status: Alert/Oriented     Walking or Climbing Stairs ambulation difficulty, requires equipment     Mobility Management walker     Dressing/Bathing bathing difficulty, requires equipment     Dressing/Bathing Management shower chair and grab bars     Equipment Currently Used at Home glucometer;grab bar;shower chair;walker, rolling     Readmission within 30 days? No     Patient currently being followed by outpatient case management? No     Do you currently have service(s) that help you manage your care at home? No     Do you take prescription medications? Yes     Do you have prescription coverage? Yes     Coverage MEDICARE - MEDICARE PART A & B     Do you have any problems affording any of your prescribed medications? No     Is the patient taking medications as prescribed? yes     Who is going to help you get home at discharge? Family     How do you get to doctors appointments? family or friend will provide     Are you on dialysis? No     Do you take coumadin? No   Xeralto    Discharge Plan A New Nursing Home placement - intermediate care facility     Discharge Plan B New Nursing Home placement - intermediate care facility     DME Needed Upon Discharge  none     Discharge Plan discussed with: Patient;Sibling;Adult children     Name(s) and Number(s) DorianKevin Jr. (Son)   [phone] (Mobile)     Transition of Care Barriers None

## 2023-07-17 NOTE — ED NOTES
PT HERE ACTIVELY VOMITING. PT STATES SHE IS COLD. SHIVERING. COVERED IN WARM BLANKETS. PT STOPPED VOMITING.

## 2023-07-17 NOTE — SUBJECTIVE & OBJECTIVE
Past Medical History:   Diagnosis Date    Atrial fibrillation, controlled     Bullous pemphigoid     CHF (congestive heart failure)     Colon cancer     Diabetes mellitus, type 2     Eczema     Hypertension     Hypothyroidism        Past Surgical History:   Procedure Laterality Date    APPENDECTOMY      BREAST SURGERY      lt breast bx    CHOLECYSTECTOMY      COLON SURGERY      EYE SURGERY         Review of patient's allergies indicates:   Allergen Reactions    Nitrofurantoin monohyd/m-cryst      leg cramps       No current facility-administered medications on file prior to encounter.     Current Outpatient Medications on File Prior to Encounter   Medication Sig    bisacodyl (DULCOLAX) 5 mg EC tablet Take 5 mg by mouth daily as needed for Constipation.    busPIRone (BUSPAR) 5 MG Tab Take 1 tablet (5 mg total) by mouth 2 (two) times daily as needed (anxiety).    metoprolol succinate (TOPROL-XL) 50 MG 24 hr tablet Take 50 mg by mouth nightly.    rivaroxaban (XARELTO) 20 mg Tab Take 1 tablet (20 mg total) by mouth daily with dinner or evening meal.     Family History       Problem Relation (Age of Onset)    Diabetes Mother, Father    Heart disease Mother, Father          Tobacco Use    Smoking status: Never    Smokeless tobacco: Never   Substance and Sexual Activity    Alcohol use: No    Drug use: No    Sexual activity: Never     Review of Systems   Constitutional:  Negative for chills and fever.   HENT:          Hearing loss   Respiratory:  Positive for shortness of breath.    Cardiovascular:  Positive for chest pain and palpitations.        Feet swelling   Gastrointestinal:  Negative for abdominal pain, constipation and diarrhea.   Genitourinary:  Negative for dysuria and frequency.   Neurological:  Positive for weakness (generalized).   Psychiatric/Behavioral:  The patient is nervous/anxious.    Objective:     Vital Signs (Most Recent):  Temp: 97.8 °F (36.6 °C) (07/17/23 0118)  Pulse: (!) 127 (07/17/23 0330)  Resp:  (!) 25 (07/17/23 0330)  BP: (!) 146/70 (07/17/23 0330)  SpO2: 100 % (07/17/23 0330) Vital Signs (24h Range):  Temp:  [97.8 °F (36.6 °C)] 97.8 °F (36.6 °C)  Pulse:  [] 127  Resp:  [22-42] 25  SpO2:  [99 %-100 %] 100 %  BP: (116-148)/() 146/70     Weight: 44 kg (97 lb)  Body mass index is 16.14 kg/m².     Physical Exam  Vitals and nursing note reviewed.   Constitutional:       Comments: Elderly frail chronically ill appearing female, lying in stretcher   HENT:      Head: Normocephalic and atraumatic.      Mouth/Throat:      Mouth: Mucous membranes are moist.      Comments: Missing dentitions  Eyes:      General:         Right eye: No discharge.         Left eye: No discharge.      Conjunctiva/sclera: Conjunctivae normal.   Cardiovascular:      Rate and Rhythm: Tachycardia present. Rhythm irregular.      Comments: Bilateral pedal edema  Pulmonary:      Effort: No respiratory distress.      Breath sounds: No stridor. No wheezing.      Comments: On NC  Abdominal:      General: Bowel sounds are normal. There is no distension.      Palpations: Abdomen is soft.      Tenderness: There is no abdominal tenderness. There is no guarding.   Genitourinary:     Comments: Wearing diaper   Musculoskeletal:      Comments: Evidence of cachexia with bitemporal muscle wasting, low BMI   Skin:     General: Skin is warm.   Neurological:      Mental Status: She is alert.      Comments: Hard of hearing, AO X 3, no aphasia/dysarthria, moving all 4 extremities, generalized weakness   Psychiatric:      Comments: Anxious appearing              Significant Labs: Blood Culture: No results for input(s): LABBLOO in the last 48 hours.  BMP:   Recent Labs   Lab 07/17/23  0129   *      K 4.3      CO2 26   BUN 30*   CREATININE 0.8   CALCIUM 9.5   MG 1.9     CBC:   Recent Labs   Lab 07/17/23 0129   WBC 8.43   HGB 12.9   HCT 41.0        CMP:   Recent Labs   Lab 07/17/23 0129      K 4.3      CO2 26   GLU  136*   BUN 30*   CREATININE 0.8   CALCIUM 9.5   PROT 7.5   ALBUMIN 4.1   BILITOT 1.3*   ALKPHOS 44*   AST 27   ALT 17   ANIONGAP 10     Cardiac Markers:   Recent Labs   Lab 07/17/23  0129   *     Lactic Acid: No results for input(s): LACTATE in the last 48 hours.  Magnesium:   Recent Labs   Lab 07/17/23  0129   MG 1.9     POCT Glucose: No results for input(s): POCTGLUCOSE in the last 48 hours.  Respiratory Culture: No results for input(s): GSRESP, RESPIRATORYC in the last 48 hours.  Troponin:   Recent Labs   Lab 07/17/23  0129   TROPONINIHS 5.0     TSH: No results for input(s): TSH in the last 4320 hours.  Urine Culture: No results for input(s): LABURIN in the last 48 hours.  Urine Studies:   Recent Labs   Lab 07/17/23  0151   COLORU Yellow   APPEARANCEUA Clear   PHUR 6.0   SPECGRAV 1.020   PROTEINUA Trace*   GLUCUA Negative   KETONESU 1+*   BILIRUBINUA Negative   OCCULTUA Negative   NITRITE Negative   UROBILINOGEN Negative   LEUKOCYTESUR Negative       Significant Imaging: I have reviewed all pertinent imaging results/findings within the past 24 hours.    No results found.    Echocardiogram  Summary    Decreased left ventricular systolic function. The estimated ejection fraction is 40%  Local segmental wall motion abnormalities.  Atrial fibrillation observed.  Mild left atrial enlargement.  Mild right atrial enlargement.  Mild-to-moderate mitral regurgitation.  Mild pulmonic regurgitation.  Normal central venous pressure (3 mm Hg).  The estimated PA systolic pressure is 32 mm Hg  There is a left pleural effusion.

## 2023-07-17 NOTE — CONSULTS
[facility]  Department of Cardiology  Consult Note      PATIENT NAME: Jennifer Alarcon  MRN: [id number]  TODAY'S DATE: 07/17/2023  ADMIT DATE: 7/17/2023                          CONSULT REQUESTED BY: Ramiro Borges MD    SUBJECTIVE     PRINCIPAL PROBLEM: <principal problem not specified>      REASON FOR CONSULT:  AFIB      HPI        89 year old female patient Followed by [facility]. She has Persistent chronic AFIB. She was admitted with weakness and palpitations found to be in AFIB with RVR.  IV lopressor given. She was actively vomiting last night and shivering She was noted to be in asystole HR to be at 30 for about 10 seconds with out compressions. TSH-13.040. Currently HR stable. Denies CP or SOB. Feels better. Currently lives alone. Does not eat well. Very frail. Dehydrated upon arrival it seems.       FROM H AND P       Principal Problem:<principal problem not specified>     Chief Complaint:       Chief Complaint   Patient presents with    Palpitations    Shortness of Breath    Abdominal Pain         HPI: Ms. Alarcon is a 89-years-old female who presented to the ED due to generalized weakness associated with palpitation.  Patient states she went to sleep at around 11:00 p.m., noted pounding sensation in her back, rapid, generally weak, possibly short of breath. Initially in the ED on arrival had nausea with episode of emesis.  Patient with known history of chronic atrial fibrillation, on metoprolol and Xarelto, seen by cardiologist Dr. Horan in the past.  Has chronic pedal edema.  Ambulates with walker baseline.  In the past she has declined permanent pacemaker placement.  Initially in the ED she was found to be in atrial fibrillation with RVR with heart rate 120-140, she was given 5 mg Lopressor with improvement in heart rate but then slowly declining heart rate with severe bradycardia with prolonged pauses, with same she became obtunded, as per ED provider about 3 minutes, external  pacer was placed, she was given atropine, epinephrine and fluids.  ED provider discussed with on-call cardiologist, Dr. Thomas, recommended temporary pacemaker placement. Patient extremely anxious and as per ED provider requesting to speak to  as she believes she is about to die, reportedly was stating did not want external pacer as she is extremely sensitive to pain.  Code status addressed and confirmed DNR.   subsequently visited patient with family members present at bedside including two sons.  Case discussed with ED provider who requested admission.  Discussed with patient and family members present at bedside, currently she is in Afib with RVR with heart rate 130, clarified she is DNR, at this time decision to keep pacer pads in place, if needed she may decide to remove at that time, she is concerned removal at this time will be suicide and wishes to discuss further with the . She is alert and oriented and remains anxious at bedside.         Review of patient's allergies indicates:   Allergen Reactions    Nitrofurantoin monohyd/m-cryst      leg cramps       Past Medical History:   Diagnosis Date    Atrial fibrillation, controlled     Bullous pemphigoid     CHF (congestive heart failure)     Colon cancer     Diabetes mellitus, type 2     Eczema     Hypertension     Hypothyroidism      Past Surgical History:   Procedure Laterality Date    APPENDECTOMY      BREAST SURGERY      lt breast bx    CHOLECYSTECTOMY      COLON SURGERY      EYE SURGERY       Social History     Tobacco Use    Smoking status: Never    Smokeless tobacco: Never   Substance Use Topics    Alcohol use: No    Drug use: No        REVIEW OF SYSTEMS    +weakness  +palpitations  OBJECTIVE     VITAL SIGNS (Most Recent)  Temp: 97.8 °F (36.6 °C) (07/17/23 0545)  Pulse: 93 (07/17/23 0700)  Resp: (!) 35 (07/17/23 0700)  BP: (!) 120/57 (07/17/23 0700)  SpO2: 95 % (07/17/23 0700)    VENTILATION STATUS  Resp: (!) 35 (07/17/23 0700)  SpO2:  95 % (07/17/23 0700)           I & O (Last 24H):  Intake/Output Summary (Last 24 hours) at 7/17/2023 0935  Last data filed at 7/17/2023 0851  Gross per 24 hour   Intake 360 ml   Output --   Net 360 ml       WEIGHTS  Wt Readings from Last 3 Encounters:   07/17/23 0118 44 kg (97 lb)   07/17/23 0924 44 kg (97 lb)   01/28/23 1538 49.9 kg (110 lb)       PHYSICAL EXAM  GENERAL: Very thin and frail elderly female in no distress  HEENT: Normocephalic. Pupils normal and conjunctivae normal.  Mucous membranes normal, no cyanosis or icterus, trachea central,no pallor or icterus is noted..   NECK: No JVD. No bruit..   THYROID: Thyroid not enlarged. No nodules present..   CARDIAC: IRR, Systolic murmur noted  CHEST ANATOMY: normal.   LUNGS: Clear to auscultation. No wheezing or rhonchi..   ABDOMEN: flat abdomen  URINARY: No lopez catheter   EXTREMITIES: No cyanosis, clubbing or edema noted at this time., no calf tenderness bilaterally.   PERIPHERAL VASCULAR SYSTEM: Good palpable distal pulses.   CENTRAL NERVOUS SYSTEM: No focal motor or sensory deficits noted.   SKIN: Skin without lesions, moist, well perfused.   MUSCLE STRENGTH & TONE: No noteable weakness, atrophy or abnormal movement.     HOME MEDICATIONS:  No current facility-administered medications on file prior to encounter.     Current Outpatient Medications on File Prior to Encounter   Medication Sig Dispense Refill    bisacodyl (DULCOLAX) 5 mg EC tablet Take 5 mg by mouth daily as needed for Constipation.      busPIRone (BUSPAR) 5 MG Tab Take 1 tablet (5 mg total) by mouth 2 (two) times daily as needed (anxiety). 30 tablet 1    metoprolol succinate (TOPROL-XL) 50 MG 24 hr tablet Take 50 mg by mouth nightly.      rivaroxaban (XARELTO) 20 mg Tab Take 1 tablet (20 mg total) by mouth daily with dinner or evening meal. 30 tablet 0       SCHEDULED MEDS:   enoxparin  1 mg/kg Subcutaneous Q12H (treatment, non-standard time)    mupirocin   Nasal BID       CONTINUOUS  INFUSIONS:    PRN MEDS:acetaminophen, busPIRone, dextrose 50%, dextrose 50%, glucagon (human recombinant), glucose, glucose, magnesium oxide, magnesium oxide, naloxone, ondansetron, potassium bicarbonate, potassium bicarbonate, potassium bicarbonate, potassium, sodium phosphates, potassium, sodium phosphates, potassium, sodium phosphates, senna-docusate 8.6-50 mg, sodium chloride 0.9%    LABS AND DIAGNOSTICS     CBC LAST 3 DAYS  Recent Labs   Lab 07/17/23  0129 07/17/23  0501   WBC 8.43 7.44   RBC 4.59 4.30   HGB 12.9 12.1   HCT 41.0 38.7   MCV 89 90   MCH 28.1 28.1   MCHC 31.5* 31.3*   RDW 14.1 14.2    157   MPV 10.1 10.3   GRAN 60.1  5.1  --    LYMPH 31.3  2.6  --    MONO 7.5  0.6  --    BASO 0.04  --    NRBC 0  --        COAGULATION LAST 3 DAYS  No results for input(s): LABPT, INR, APTT in the last 168 hours.    CHEMISTRY LAST 3 DAYS  Recent Labs   Lab 07/17/23  0129 07/17/23  0501    139   K 4.3 4.7    103   CO2 26 29   ANIONGAP 10 7*   BUN 30* 27*   CREATININE 0.8 0.6   * 166*   CALCIUM 9.5 9.2   MG 1.9 1.8   ALBUMIN 4.1  --    PROT 7.5  --    ALKPHOS 44*  --    ALT 17  --    AST 27  --    BILITOT 1.3*  --        CARDIAC PROFILE LAST 3 DAYS  Recent Labs   Lab 07/17/23  0129 07/17/23  0501   *  --    TROPONINIHS 5.0 8.4       ENDOCRINE LAST 3 DAYS  Recent Labs   Lab 07/17/23  0129   TSH 13.040*       LAST ARTERIAL BLOOD GAS  ABG  No results for input(s): PH, PO2, PCO2, HCO3, BE in the last 168 hours.    LAST 7 DAYS MICROBIOLOGY   Microbiology Results (last 7 days)       ** No results found for the last 168 hours. **            MOST RECENT IMAGING  X-Ray Chest AP Portable  Reason: Chest Pain Chest pain; Pt unable to move lt arm    FINDINGS:  Portable chest at 138 compared with 8/8/2022 shows normal cardiomediastinal silhouette.    Biapical pleural parenchymal scarring has not significantly changed. Lung show no new confluent alveolar consolidation, pleural effusion, or  pneumothorax. Pulmonary vasculature is normal. No acute osseous abnormality.    IMPRESSION:  No acute cardiopulmonary abnormality.    Electronically signed by:  Dionte Childers MD  7/17/2023 7:41 AM CDT Workstation: [id number]      ECHOCARDIOGRAM RESULTS (last 5)  Results for orders placed during the hospital encounter of 12/29/19    Echo Color Flow Doppler? Yes; Bubble Contrast? No    Interpretation Summary  · Decreased left ventricular systolic function. The estimated ejection fraction is 40%  · Local segmental wall motion abnormalities.  · Atrial fibrillation observed.  · Mild left atrial enlargement.  · Mild right atrial enlargement.  · Mild-to-moderate mitral regurgitation.  · Mild pulmonic regurgitation.  · Normal central venous pressure (3 mm Hg).  · The estimated PA systolic pressure is 32 mm Hg  · There is a left pleural effusion.      CURRENT/PREVIOUS VISIT EKG  Results for orders placed or performed during the hospital encounter of 07/17/23   EKG 12-lead    Collection Time: 07/17/23  1:54 AM    Narrative    Test Reason : R00.1,    Vent. Rate : 080 BPM     Atrial Rate : 000 BPM     P-R Int : 000 ms          QRS Dur : 134 ms      QT Int : 410 ms       P-R-T Axes : 000 -81 080 degrees     QTc Int : 472 ms    Atrial fibrillation  Left axis deviation  Right bundle branch block  Possible Lateral infarct (cited on or before 17-JUL-2023)  Abnormal ECG  When compared with ECG of 17-JUL-2023 01:23,  Vent. rate has decreased BY  49 BPM  Right bundle branch block has replaced Nonspecific intraventricular block    Referred By: [email or web address]   SELF           Confirmed By:            ASSESSMENT/PLAN:     Active Hospital Problems    Diagnosis    Atrial fibrillation with RVR    Advanced age    Anxiety    DNR (do not resuscitate)    Pedal edema    Sick sinus syndrome    Mitral regurgitation, moderate     Cardiomyopathy    Current use of long term anticoagulation    Walker as ambulation aid    History of colon cancer        ASSESSMENT & PLAN:     AFIB-Chronic-rate controlled currently  Tachy patito syndrome-improved  MR  Cardiomyopathy  Frail  Advanced Age  On xarelto at home  Anxiety  Elevated TSH    RECOMMENDATIONS:      Start Thyroid Medication  AFIB is rate controlled currently  Ok to move out of ICU  High falls risk   Anticoagulation is high risk while she is living at home alone however she is planning on going to a nursing home for more help. Consider low dose eliquis instead in this elderly frail patient at discharge.  Consult   Will monitor overnight  Thank you for the consultation        Cristina Paez NP  Department of Cardiology  Date of Service: 07/17/2023      89-year-old lady with history of atrial fibrillation admitted with tachy-patito syndrome hypersensitive response to metoprolol given intravenously developed transient asystole and marked bradycardia which gradually improved.  This was treated with a temporary external pacing.  Since then she is been doing better with controlled heart rate.  Patient has poor dietary habits probably admitted with some volume depletion dehydration in association with generalized malnutrition.  In addition she is at very high risk for falls therefore not a candidate for anticoagulation therapy.  Family support his limited.  Patient's family is requesting evaluation for possible placement.  Patient has previously been on Xarelto and Toprol XL at home.  I have personally interviewed and examined the patient, I have reviewed the Nurse Practitioner's history and physical, assessment, and plan. I agree with the findings and plan.  Recommend to maintain on DVT prophylaxis for the time being monitor heart rate without further beta-blocker therapy and if she remains stable upon discharge may consider giving her Plavix and avoid oral anticoagulation therapy.  Maintain on magnesium oxide as tolerated at 400 milligrams daily  Case management evaluation.  Discussed with the  nursing staff and patient's son and her sister at bedside.      Matt Thomas M.D.  Department of Cardiology  Date of Service: 07/17/2023  9:35 AM

## 2023-07-17 NOTE — HPI
Ms. Alarcon is a 89-years-old female who presented to the ED due to generalized weakness associated with palpitation.  Patient states she went to sleep at around 11:00 p.m., noted pounding sensation in her back, rapid, generally weak, possibly short of breath. Initially in the ED on arrival had nausea with episode of emesis.  Patient with known history of chronic atrial fibrillation, on metoprolol and Xarelto, seen by cardiologist Dr. Horan in the past.  Has chronic pedal edema.  Ambulates with walker baseline.  In the past she has declined permanent pacemaker placement.  Initially in the ED she was found to be in atrial fibrillation with RVR with heart rate 120-140, she was given 5 mg Lopressor with improvement in heart rate but then slowly declining heart rate with severe bradycardia with prolonged pauses, with same she became obtunded, as per ED provider about 3 minutes, external pacer was placed, she was given atropine, epinephrine and fluids.  ED provider discussed with on-call cardiologist, Dr. Thomas, recommended temporary pacemaker placement. Patient extremely anxious and as per ED provider requesting to speak to  as she believes she is about to die, reportedly was stating did not want external pacer as she is extremely sensitive to pain.  Code status addressed and confirmed DNR.   subsequently visited patient with family members present at bedside including two sons.  Case discussed with ED provider who requested admission.  Discussed with patient and family members present at bedside, currently she is in Afib with RVR with heart rate 130, clarified she is DNR, at this time decision to keep pacer pads in place, if needed she may decide to remove at that time, she is concerned removal at this time will be suicide and wishes to discuss further with the . She is alert and oriented and remains anxious at bedside.    Patient's mother called and notified of Maia's message below. She states she asked for an oral medication at urgent care a few days ago and the provider stated it was not needed and to just use the shampoo. Mom states she does have 3 areas on her scalp that look like ringworm. Appointment scheduled for this afternoon with Maia.

## 2023-07-18 PROCEDURE — 97535 SELF CARE MNGMENT TRAINING: CPT

## 2023-07-18 PROCEDURE — 97530 THERAPEUTIC ACTIVITIES: CPT

## 2023-07-18 PROCEDURE — 63600175 PHARM REV CODE 636 W HCPCS: Performed by: INTERNAL MEDICINE

## 2023-07-18 PROCEDURE — 97161 PT EVAL LOW COMPLEX 20 MIN: CPT

## 2023-07-18 PROCEDURE — 25000003 PHARM REV CODE 250: Performed by: STUDENT IN AN ORGANIZED HEALTH CARE EDUCATION/TRAINING PROGRAM

## 2023-07-18 PROCEDURE — 97165 OT EVAL LOW COMPLEX 30 MIN: CPT

## 2023-07-18 PROCEDURE — 99232 SBSQ HOSP IP/OBS MODERATE 35: CPT | Mod: ,,, | Performed by: INTERNAL MEDICINE

## 2023-07-18 PROCEDURE — 97116 GAIT TRAINING THERAPY: CPT

## 2023-07-18 PROCEDURE — 99232 PR SUBSEQUENT HOSPITAL CARE,LEVL II: ICD-10-PCS | Mod: ,,, | Performed by: INTERNAL MEDICINE

## 2023-07-18 PROCEDURE — 21400001 HC TELEMETRY ROOM

## 2023-07-18 RX ADMIN — SENNOSIDES AND DOCUSATE SODIUM 1 TABLET: 50; 8.6 TABLET ORAL at 09:07

## 2023-07-18 RX ADMIN — BUSPIRONE HYDROCHLORIDE 5 MG: 5 TABLET ORAL at 10:07

## 2023-07-18 RX ADMIN — SENNOSIDES AND DOCUSATE SODIUM 2 TABLET: 50; 8.6 TABLET ORAL at 08:07

## 2023-07-18 RX ADMIN — ENOXAPARIN SODIUM 40 MG: 100 INJECTION SUBCUTANEOUS at 06:07

## 2023-07-18 RX ADMIN — MUPIROCIN 1 G: 20 OINTMENT TOPICAL at 09:07

## 2023-07-18 RX ADMIN — MUPIROCIN 1 G: 20 OINTMENT TOPICAL at 08:07

## 2023-07-18 NOTE — PROGRESS NOTES
Counts include 234 beds at the [facility]  Department of Cardiology  Consult Note      PATIENT NAME: Jennifer Alarcon  MRN: [id number]  TODAY'S DATE: 07/18/2023  ADMIT DATE: 7/17/2023                          CONSULT REQUESTED BY: Ramiro Borges MD    SUBJECTIVE     PRINCIPAL PROBLEM: Atrial fibrillation with RVR      REASON FOR CONSULT:  AFIB              7/18/2023      Patient feeling better. HR is 75-90.   She wants to go home.  Patient remains alert and responsive and patient's daughter and her son at bedside.    She is not convinced about going to a nursing home at this time she wants to go home  No further episodes of syncope or weakness noted.              HPI        89 year old female patient Followed by [facility]. She has Persistent chronic AFIB. She was admitted with weakness and palpitations found to be in AFIB with RVR.  IV lopressor given. She was actively vomiting last night and shivering She was noted to be in asystole HR to be at 30 for about 10 seconds with out compressions. TSH-13.040. Currently HR stable. Denies CP or SOB. Feels better. Currently lives alone. Does not eat well. Very frail. Dehydrated upon arrival it seems.       FROM H AND P       Principal Problem:<principal problem not specified>     Chief Complaint:       Chief Complaint   Patient presents with    Palpitations    Shortness of Breath    Abdominal Pain         HPI: Ms. Alarcon is a 89-years-old female who presented to the ED due to generalized weakness associated with palpitation.  Patient states she went to sleep at around 11:00 p.m., noted pounding sensation in her back, rapid, generally weak, possibly short of breath. Initially in the ED on arrival had nausea with episode of emesis.  Patient with known history of chronic atrial fibrillation, on metoprolol and Xarelto, seen by cardiologist Dr. Horan in the past.  Has chronic pedal edema.  Ambulates with walker baseline.  In the past she has declined permanent pacemaker placement.   Initially in the ED she was found to be in atrial fibrillation with RVR with heart rate 120-140, she was given 5 mg Lopressor with improvement in heart rate but then slowly declining heart rate with severe bradycardia with prolonged pauses, with same she became obtunded, as per ED provider about 3 minutes, external pacer was placed, she was given atropine, epinephrine and fluids.  ED provider discussed with on-call cardiologist, Dr. Thomas, recommended temporary pacemaker placement. Patient extremely anxious and as per ED provider requesting to speak to  as she believes she is about to die, reportedly was stating did not want external pacer as she is extremely sensitive to pain.  Code status addressed and confirmed DNR.   subsequently visited patient with family members present at bedside including two sons.  Case discussed with ED provider who requested admission.  Discussed with patient and family members present at bedside, currently she is in Afib with RVR with heart rate 130, clarified she is DNR, at this time decision to keep pacer pads in place, if needed she may decide to remove at that time, she is concerned removal at this time will be suicide and wishes to discuss further with the . She is alert and oriented and remains anxious at bedside.         Review of patient's allergies indicates:   Allergen Reactions    Nitrofurantoin monohyd/m-cryst      leg cramps       Past Medical History:   Diagnosis Date    Atrial fibrillation, controlled     Bullous pemphigoid     CHF (congestive heart failure)     Colon cancer     Diabetes mellitus, type 2     Eczema     Hypertension     Hypothyroidism      Past Surgical History:   Procedure Laterality Date    APPENDECTOMY      BREAST SURGERY      lt breast bx    CHOLECYSTECTOMY      COLON SURGERY      EYE SURGERY       Social History     Tobacco Use    Smoking status: Never    Smokeless tobacco: Never   Substance Use Topics    Alcohol use: No    Drug  use: No        REVIEW OF SYSTEMS    Feeling better  OBJECTIVE     VITAL SIGNS (Most Recent)  Temp: 98.5 °F (36.9 °C) (07/18/23 1140)  Pulse: 84 (07/18/23 1140)  Resp: 20 (07/18/23 1140)  BP: 120/60 (07/18/23 1140)  SpO2: 97 % (07/18/23 1140)    VENTILATION STATUS  Resp: 20 (07/18/23 1140)  SpO2: 97 % (07/18/23 1140)           I & O (Last 24H):  Intake/Output Summary (Last 24 hours) at 7/18/2023 1545  Last data filed at 7/18/2023 0956  Gross per 24 hour   Intake 1030 ml   Output 1150 ml   Net -120 ml       WEIGHTS  Wt Readings from Last 3 Encounters:   07/18/23 0350 44.8 kg (98 lb 12.3 oz)   07/17/23 0715 44.6 kg (98 lb 5.2 oz)   07/17/23 0118 44 kg (97 lb)   07/17/23 0924 44 kg (97 lb)   01/28/23 1538 49.9 kg (110 lb)       PHYSICAL EXAM  GENERAL  Physical examination:      Constitutional:  Well-built well-nourished in no apparent distress, alert and oriented    Neck: no carotid bruit, no JVD, no masses    Lungs: diminished breath sounds bibasilar, rhonchi bilaterally  Chest Wall: no tenderness  CARDIAC:  Irregular rhythm  Abdomen: soft, non-tender; bowel sounds normal; no masses,  no organomegaly, no guarding or rebound noted  Extremities: Extremities normal, atraumatic, no cyanosis, clubbing, or edema  Skin: Skin color, texture, turgor normal. No rashes or lesions  Neuro: Alert and responsive.  Moving all extremities          HOME MEDICATIONS:  No current facility-administered medications on file prior to encounter.     Current Outpatient Medications on File Prior to Encounter   Medication Sig Dispense Refill    bisacodyl (DULCOLAX) 5 mg EC tablet Take 5 mg by mouth daily as needed for Constipation.      metoprolol succinate (TOPROL-XL) 50 MG 24 hr tablet Take 75 mg by mouth once daily.      busPIRone (BUSPAR) 5 MG Tab Take 1 tablet (5 mg total) by mouth 2 (two) times daily as needed (anxiety). (Patient taking differently: Take 5 mg by mouth 2 (two) times daily as needed (anxiety). NEW Rx - NOT YET STARTED) 30  tablet 1    fluocinolone acetonide oiL 0.01 % Drop Place 5 drops in ear(s) 2 (two) times daily as needed.      furosemide (LASIX) 20 MG tablet Take 20 mg by mouth once daily.      rivaroxaban (XARELTO) 15 mg Tab Take 15 mg by mouth 2 (two) times a day.         SCHEDULED MEDS:   enoxparin  40 mg Subcutaneous Q24H (prophylaxis, 1700)    mupirocin   Nasal BID       CONTINUOUS INFUSIONS:    PRN MEDS:acetaminophen, busPIRone, dextrose 50%, dextrose 50%, glucagon (human recombinant), glucose, glucose, magnesium oxide, magnesium oxide, naloxone, ondansetron, potassium bicarbonate, potassium bicarbonate, potassium bicarbonate, potassium, sodium phosphates, potassium, sodium phosphates, potassium, sodium phosphates, senna-docusate 8.6-50 mg, sodium chloride 0.9%    LABS AND DIAGNOSTICS     CBC LAST 3 DAYS  Recent Labs   Lab 07/17/23 0129 07/17/23  0501   WBC 8.43 7.44   RBC 4.59 4.30   HGB 12.9 12.1   HCT 41.0 38.7   MCV 89 90   MCH 28.1 28.1   MCHC 31.5* 31.3*   RDW 14.1 14.2    157   MPV 10.1 10.3   GRAN 60.1  5.1  --    LYMPH 31.3  2.6  --    MONO 7.5  0.6  --    BASO 0.04  --    NRBC 0  --        COAGULATION LAST 3 DAYS  No results for input(s): LABPT, INR, APTT in the last 168 hours.    CHEMISTRY LAST 3 DAYS  Recent Labs   Lab 07/17/23 0129 07/17/23  0501    139   K 4.3 4.7    103   CO2 26 29   ANIONGAP 10 7*   BUN 30* 27*   CREATININE 0.8 0.6   * 166*   CALCIUM 9.5 9.2   MG 1.9 1.8   ALBUMIN 4.1  --    PROT 7.5  --    ALKPHOS 44*  --    ALT 17  --    AST 27  --    BILITOT 1.3*  --        CARDIAC PROFILE LAST 3 DAYS  Recent Labs   Lab 07/17/23 0129 07/17/23  0501   *  --    TROPONINIHS 5.0 8.4       ENDOCRINE LAST 3 DAYS  Recent Labs   Lab 07/17/23 0129   TSH 13.040*       LAST ARTERIAL BLOOD GAS  ABG  No results for input(s): PH, PO2, PCO2, HCO3, BE in the last 168 hours.    LAST 7 DAYS MICROBIOLOGY   Microbiology Results (last 7 days)       ** No results found for the last 168  hours. **            MOST RECENT IMAGING  Echo  · The left ventricle is normal in size with mildly decreased systolic   function.  · The estimated ejection fraction is 45%.  · Grade III left ventricular diastolic dysfunction.  · There are segmental left ventricular wall motion abnormalities.  · There is abnormal septal wall motion consistent with left bundle branch   block.  · Normal right ventricular size with normal right ventricular systolic   function.  · Moderate left atrial enlargement.  · Mild-to-moderate mitral regurgitation.  · Mild tricuspid regurgitation.  · Normal central venous pressure (3 mmHg).  · The estimated PA systolic pressure is 25 mmHg.     X-Ray Chest AP Portable  Reason: Chest Pain Chest pain; Pt unable to move lt arm    FINDINGS:  Portable chest at 138 compared with 8/8/2022 shows normal cardiomediastinal silhouette.    Biapical pleural parenchymal scarring has not significantly changed. Lung show no new confluent alveolar consolidation, pleural effusion, or pneumothorax. Pulmonary vasculature is normal. No acute osseous abnormality.    IMPRESSION:  No acute cardiopulmonary abnormality.    Electronically signed by:  Dionte Childers MD  7/17/2023 7:41 AM CDT Workstation: [id number]      ECHOCARDIOGRAM RESULTS (last 5)  Results for orders placed during the hospital encounter of 12/29/19    Echo Color Flow Doppler? Yes; Bubble Contrast? No    Interpretation Summary  · Decreased left ventricular systolic function. The estimated ejection fraction is 40%  · Local segmental wall motion abnormalities.  · Atrial fibrillation observed.  · Mild left atrial enlargement.  · Mild right atrial enlargement.  · Mild-to-moderate mitral regurgitation.  · Mild pulmonic regurgitation.  · Normal central venous pressure (3 mm Hg).  · The estimated PA systolic pressure is 32 mm Hg  · There is a left pleural effusion.      CURRENT/PREVIOUS VISIT EKG  Results for orders placed or performed during the hospital encounter  of 07/17/23   EKG 12-lead    Collection Time: 07/17/23  1:54 AM    Narrative    Test Reason : R00.1,    Vent. Rate : 080 BPM     Atrial Rate : 000 BPM     P-R Int : 000 ms          QRS Dur : 134 ms      QT Int : 410 ms       P-R-T Axes : 000 -81 080 degrees     QTc Int : 472 ms    Atrial fibrillation  Left axis deviation  Right bundle branch block  Possible Lateral infarct (cited on or before 17-JUL-2023)  Abnormal ECG  When compared with ECG of 17-JUL-2023 01:23,  Vent. rate has decreased BY  49 BPM  Right bundle branch block has replaced Nonspecific intraventricular block    Referred By: [email or web address]   SELF           Confirmed By:            ASSESSMENT/PLAN:     Active Hospital Problems    Diagnosis    *Atrial fibrillation with RVR    Advanced age    Anxiety    DNR (do not resuscitate)    Pedal edema    Sick sinus syndrome    Mitral regurgitation, moderate     Cardiomyopathy    Current use of long term anticoagulation    Walker as ambulation aid    History of colon cancer       ASSESSMENT & PLAN:     AFIB-Chronic-rate controlled currently  Tachy patito syndrome-improved  MR  Cardiomyopathy  Frail  Advanced Age  On xarelto at home  Anxiety  Elevated TSH    RECOMMENDATIONS:      Patient is doing better.  She wants to go home.  Recommend her go home on thyroid supplementation  At 89 and being frail I would consider changing her to Eliquis 2.5 mg PO BID.            Cristina Paez NP  Department of Cardiology  Date of Service: 07/18/2023      Given the patient's age and fragility recommend to use short-acting NOAC at lower dose of 2.5 mg of Eliquis b.i.d.  At this point patient is not convinced about going to a nursing home.  At least consider placement skilled nursing facility for gait stability and physical therapy prior to returning home.  I have personally interviewed and examined the patient, I have reviewed the Nurse Practitioner's history and physical, assessment, and plan. I have personally evaluated the patient  at bedside and agree with the findings and made appropriate changes as necessary in recommendations.        Matt Thomas M.D.  Department of Cardiology  Date of Service: 07/18/2023  9:35 AM

## 2023-07-18 NOTE — PROGRESS NOTES
Pharmacist Renal Dose Adjustment Note    Jennifer Alarcon is a 89 y.o. female being treated with Enoxaparin.    Patient Data:    Vital Signs (Most Recent):  Temp: 97.9 °F (36.6 °C) (07/17/23 1920)  Pulse: 90 (07/17/23 1920)  Resp: 20 (07/17/23 1920)  BP: (!) 118/57 (07/17/23 1920)  SpO2: 98 % (07/17/23 1920) Vital Signs (72h Range):  Temp:  [97.6 °F (36.4 °C)-98.1 °F (36.7 °C)]   Pulse:  []   Resp:  [15-55]   BP: (105-163)/()   SpO2:  [83 %-100 %]      Recent Labs   Lab 07/17/23  0129 07/17/23  0501   CREATININE 0.8 0.6     Serum creatinine: 0.6 mg/dL 07/17/23 0501  Estimated creatinine clearance: 43.9 mL/min  Body mass index is 16.36 kg/m².    Enoxaparin 30 mg every 24 hours will be changed to Enoxaparin 40 mg every 24 hours per Renal dose adjustment .     Pharmacist's Name: Margi Bragg  Pharmacist's Extension: [phone]

## 2023-07-18 NOTE — PROGRESS NOTES
[facility] Medicine  Progress Note    Patient Name: Jennifer Alarcon  MRN: [id number]  Patient Class: IP- Inpatient   Admission Date: 7/17/2023  Length of Stay: 1 days  Attending Physician: Ramiro Borges MD  Primary Care Provider: Tyrel Gonzales Jr, MD        Subjective:     Principal Problem:Atrial fibrillation with RVR        HPI:  Ms. Alarcon is a 89-years-old female who presented to the ED due to generalized weakness associated with palpitation.  Patient states she went to sleep at around 11:00 p.m., noted pounding sensation in her back, rapid, generally weak, possibly short of breath. Initially in the ED on arrival had nausea with episode of emesis.  Patient with known history of chronic atrial fibrillation, on metoprolol and Xarelto, seen by cardiologist Dr. Horan in the past.  Has chronic pedal edema.  Ambulates with walker baseline.  In the past she has declined permanent pacemaker placement.  Initially in the ED she was found to be in atrial fibrillation with RVR with heart rate 120-140, she was given 5 mg Lopressor with improvement in heart rate but then slowly declining heart rate with severe bradycardia with prolonged pauses, with same she became obtunded, as per ED provider about 3 minutes, external pacer was placed, she was given atropine, epinephrine and fluids.  ED provider discussed with on-call cardiologist, Dr. Thomas, recommended temporary pacemaker placement. Patient extremely anxious and as per ED provider requesting to speak to  as she believes she is about to die, reportedly was stating did not want external pacer as she is extremely sensitive to pain.  Code status addressed and confirmed DNR.   subsequently visited patient with family members present at bedside including two sons.  Case discussed with ED provider who requested admission.  Discussed with patient and family members present at bedside, currently she is in Afib with RVR with heart rate  130, clarified she is DNR, at this time decision to keep pacer pads in place, if needed she may decide to remove at that time, she is concerned removal at this time will be suicide and wishes to discuss further with the . She is alert and oriented and remains anxious at bedside.       Overview/Hospital Course:  No notes on file    Interval History: No acute events overnight. She is feeling better today. HR has been stable. Will need placement    Review of Systems   All other systems reviewed and are negative.  Objective:     Vital Signs (Most Recent):  Temp: 98.5 °F (36.9 °C) (07/18/23 1140)  Pulse: 84 (07/18/23 1140)  Resp: 20 (07/18/23 1140)  BP: 120/60 (07/18/23 1140)  SpO2: 97 % (07/18/23 1140) Vital Signs (24h Range):  Temp:  [97.7 °F (36.5 °C)-98.5 °F (36.9 °C)] 98.5 °F (36.9 °C)  Pulse:  [73-90] 84  Resp:  [17-55] 20  SpO2:  [87 %-98 %] 97 %  BP: (109-140)/(57-80) 120/60     Weight: 44.8 kg (98 lb 12.3 oz)  Body mass index is 16.44 kg/m².    Intake/Output Summary (Last 24 hours) at 7/18/2023 1418  Last data filed at 7/18/2023 0956  Gross per 24 hour   Intake 1030 ml   Output 1150 ml   Net -120 ml         Physical Exam  Constitutional:       Comments: cachectic   HENT:      Head: Normocephalic and atraumatic.      Nose: Nose normal.      Mouth/Throat:      Mouth: Mucous membranes are moist.      Comments: Poor dentition  Eyes:      Conjunctiva/sclera: Conjunctivae normal.      Pupils: Pupils are equal, round, and reactive to light.   Cardiovascular:      Rate and Rhythm: Normal rate and regular rhythm.      Pulses: Normal pulses.      Heart sounds: Normal heart sounds. No murmur heard.    No friction rub. No gallop.   Pulmonary:      Effort: Pulmonary effort is normal.      Breath sounds: Normal breath sounds. No wheezing or rales.   Abdominal:      General: Abdomen is flat. Bowel sounds are normal. There is no distension.      Palpations: Abdomen is soft.      Tenderness: There is no abdominal  tenderness. There is no guarding.   Musculoskeletal:         General: No swelling. Normal range of motion.      Cervical back: Normal range of motion and neck supple.   Skin:     General: Skin is warm and dry.   Neurological:      General: No focal deficit present.      Mental Status: She is alert.   Psychiatric:         Mood and Affect: Mood normal.         Thought Content: Thought content normal.         Judgment: Judgment normal.           Significant Labs: All pertinent labs within the past 24 hours have been reviewed.    Significant Imaging: I have reviewed all pertinent imaging results/findings within the past 24 hours.      Assessment/Plan:      * Atrial fibrillation with RVR  No acute events overnight  Afib resolved  No further bradycardia either  Holding AVN blocking agents  Continue magnesium  Not an AC candidate      Current use of long term anticoagulation  Holding AC at this time due to fall / bleeding risk      Sick sinus syndrome  Holding antiarrhythmics and AVN blocking agents for now      Anxiety  Continue home regimen prn      Advanced age  Needs placement for rehab        VTE Risk Mitigation (From admission, onward)         Ordered     enoxaparin injection 40 mg  Every 24 hours         07/17/23 2112     IP VTE HIGH RISK PATIENT  Once         07/17/23 0349     Place sequential compression device  Until discontinued         07/17/23 0349                Discharge Planning   JASMYNE: 7/20/2023     Code Status: DNR   Is the patient medically ready for discharge?:     Reason for patient still in hospital (select all that apply): Treatment  Discharge Plan A: Skilled Nursing Facility                  Ramiro Borges MD  Department of Hospital Medicine   [facility]

## 2023-07-18 NOTE — ASSESSMENT & PLAN NOTE
No acute events overnight  Afib resolved  No further bradycardia either  Holding AVN blocking agents  Continue magnesium  Not an AC candidate

## 2023-07-18 NOTE — PT/OT/SLP EVAL
Occupational Therapy   Evaluation    Name: Jennifer Alarcon  MRN: [id number]  Admitting Diagnosis: <principal problem not specified>  Recent Surgery: * No surgery found *      Recommendations:     Discharge Recommendations: nursing facility, skilled  Discharge Equipment Recommendations:  none  Barriers to discharge:  Decreased caregiver support (decreased safety awareness, fall risk)    Assessment:     Jennifer Alarcon is a 89 y.o. female with a medical diagnosis of <principal problem not specified>.  Performance deficits affecting function: weakness, impaired endurance, impaired functional mobility, gait instability, impaired balance, decreased lower extremity function, decreased upper extremity function, decreased safety awareness.      Pt lying in bed and agreeable to OT session. Pt expressed history of falls (three in three years) and desire to be independent and safe. During evaluation today she explained her routine in the bathroom. She demonstrated poor walker management and required education to promote safety and prevent falls, however her family arrived before completing education. She would benefit from continued education and strengthening to promote functional independence. Family expressed concern for patient's risk for falling and lack of caregiver support.    Rehab Prognosis: Good; patient would benefit from acute skilled OT services to address these deficits and reach maximum level of function.       Plan:     Patient to be seen 5 x/week to address the above listed problems via self-care/home management, therapeutic activities, therapeutic exercises  Plan of Care Expires: 08/18/23  Plan of Care Reviewed with: patient, daughter, son    Subjective     Chief Complaint: to speak with case management about discharge  Patient/Family Comments/goals: to be placed in a facility    Occupational Profile:  Living Environment: alone in apartment with tub/shower combo with shower chair  Previous level of function: Mod  Ind with rollator, hx of falls, unable to cook or clean   Roles and Routines: mother, homemaker  Equipment Used at Home: walker, rolling, raised toilet, rollator, shower chair  Assistance upon Discharge: from facility staff and limited from family    Pain/Comfort:  Pain Rating 1: 0/10  Pain Rating Post-Intervention 1: 0/10    Patients cultural, spiritual, Syn[employer] conflicts given the current situation: no    Objective:     Communicated with: nurse prior to session.  Patient found HOB elevated with peripheral IV, PureWick, telemetry upon OT entry to room.    General Precautions: Standard, hearing impaired, fall  Orthopedic Precautions: N/A  Braces: N/A  Respiratory Status: Room air    Occupational Performance:    Bed Mobility:    Patient completed Rolling/Turning to Left with  contact guard assistance  Patient completed Supine to Sit with contact guard assistance    Functional Mobility/Transfers:  Patient completed Sit <> Stand Transfer with minimum assistance  with  rolling walker   Functional Mobility: ambulated in hospital room and bathroom with contact guard assistance and moderate verbal cues for rolling walker management    Activities of Daily Living:  Grooming: contact guard assistance to stand at sink to wash hands, maximal cues for RW management to approach sink  Lower Body Dressing: stand by assistance to don socks    Cognitive/Visual Perceptual:  Cognitive/Psychosocial Skills:     -       Oriented to: Person, Place, Time, and Situation   -       Follows Commands/attention:Follows multistep  commands  -       Communication: clear/fluent  -       Memory: No Deficits noted  -       Safety awareness/insight to disability: impaired   -       Mood/Affect/Coping skills/emotional control: Appropriate to situation and Cooperative    Physical Exam:  Upper Extremity Range of Motion:     -       Right Upper Extremity: WFL  -       Left Upper Extremity: WFL  Upper Extremity Strength:    -       Right Upper Extremity:  4/5  -       Left Upper Extremity: 4/5   Strength:    -       Right Upper Extremity: fair  -       Left Upper Extremity: fair  Fine Motor Coordination:    -       Intact    AMPAC 6 Click ADL:  AMPAC Total Score: 19    Treatment & Education:  Pt required extensive education for rolling walker management in the bathroom to promote safety. Extensive time was spent explaining and performing strategies.    Patient left HOB elevated with all lines intact, call button in reach, bed alarm on, and family and nurse present    GOALS:   Multidisciplinary Problems       Occupational Therapy Goals          Problem: Occupational Therapy    Goal Priority Disciplines Outcome Interventions   Occupational Therapy Goal     OT, PT/OT     Description: Goals to be met by: 8/18/2023     Patient will increase functional independence with ADLs by performing:    LE Dressing with Modified [place].  Grooming while standing at sink with Modified [place].  Toileting from toilet with Modified [place] for hygiene and clothing management.   Step transfer with Modified [place]  Toilet transfer to toilet with Modified [place].  Upper extremity exercise program x10 reps per handout, with assistance as needed.                         History:     Past Medical History:   Diagnosis Date    Atrial fibrillation, controlled     Bullous pemphigoid     CHF (congestive heart failure)     Colon cancer     Diabetes mellitus, type 2     Eczema     Hypertension     Hypothyroidism          Past Surgical History:   Procedure Laterality Date    APPENDECTOMY      BREAST SURGERY      lt breast bx    CHOLECYSTECTOMY      COLON SURGERY      EYE SURGERY         Time Tracking:     OT Date of Treatment: 07/18/23  OT Start Time: 1044  OT Stop Time: 1127  OT Total Time (min): 43 min    Billable Minutes:Evaluation 13  Self Care/Home Management 20  Therapeutic Activity 10    7/18/2023

## 2023-07-18 NOTE — PLAN OF CARE
Via [facility] response, Pt accepted to [facility], pending mandatory 3-midnight inpatient stay.  met with Pt at bedside to discuss discharge plan. Pt's daughter (Sahra Alarcon (Daughter) [phone] ([place])) also present. Pt and Pt's daughter verbalized plan for Pt to discharge to [facility].  explained skilled-nursing discharge plan. Pt and Pt's daughter agreeable to discharge plan.       07/18/23 1506   Post-Acute Status   Post-Acute Authorization Placement   Post-Acute Placement Status Pending payor review/awaiting authorization (if required)   Discharge Delays None known at this time   Discharge Plan   Discharge Plan A Skilled Nursing Facility   Discharge Plan B Skilled Nursing Facility

## 2023-07-18 NOTE — PLAN OF CARE
Problem: Adult Inpatient Plan of Care  Goal: Plan of Care Review  Outcome: Ongoing, Progressing  Goal: Patient-Specific Goal (Individualized)  Outcome: Ongoing, Progressing  Goal: Absence of Hospital-Acquired Illness or Injury  Outcome: Ongoing, Progressing  Goal: Optimal Comfort and Wellbeing  Outcome: Ongoing, Progressing  Goal: Readiness for Transition of Care  Outcome: Ongoing, Progressing     Problem: Diabetes Comorbidity  Goal: Blood Glucose Level Within Targeted Range  Outcome: Ongoing, Progressing     Problem: Fall Injury Risk  Goal: Absence of Fall and Fall-Related Injury  Outcome: Ongoing, Progressing     Problem: Skin Injury Risk Increased  Goal: Skin Health and Integrity  Outcome: Ongoing, Progressing     Problem: Dysrhythmia  Goal: Normalized Cardiac Rhythm  Outcome: Ongoing, Progressing

## 2023-07-18 NOTE — PLAN OF CARE
Patient ambulated to restroom with therapy. Family at bedside.      Problem: Adult Inpatient Plan of Care  Goal: Plan of Care Review  Outcome: Ongoing, Progressing  Flowsheets (Taken 7/18/2023 1820)  Plan of Care Reviewed With:   patient   family  Goal: Optimal Comfort and Wellbeing  Outcome: Ongoing, Progressing  Intervention: Provide Person-Centered Care  Flowsheets (Taken 7/18/2023 1820)  Trust Relationship/Rapport:   care explained   choices provided   thoughts/feelings acknowledged   reassurance provided     Problem: Fall Injury Risk  Goal: Absence of Fall and Fall-Related Injury  Outcome: Ongoing, Progressing     Problem: Skin Injury Risk Increased  Goal: Skin Health and Integrity  Outcome: Ongoing, Progressing

## 2023-07-18 NOTE — SUBJECTIVE & OBJECTIVE
Interval History: No acute events overnight. She is feeling better today. HR has been stable. Will need placement    Review of Systems   All other systems reviewed and are negative.  Objective:     Vital Signs (Most Recent):  Temp: 98.5 °F (36.9 °C) (07/18/23 1140)  Pulse: 84 (07/18/23 1140)  Resp: 20 (07/18/23 1140)  BP: 120/60 (07/18/23 1140)  SpO2: 97 % (07/18/23 1140) Vital Signs (24h Range):  Temp:  [97.7 °F (36.5 °C)-98.5 °F (36.9 °C)] 98.5 °F (36.9 °C)  Pulse:  [73-90] 84  Resp:  [17-55] 20  SpO2:  [87 %-98 %] 97 %  BP: (109-140)/(57-80) 120/60     Weight: 44.8 kg (98 lb 12.3 oz)  Body mass index is 16.44 kg/m².    Intake/Output Summary (Last 24 hours) at 7/18/2023 1418  Last data filed at 7/18/2023 0956  Gross per 24 hour   Intake 1030 ml   Output 1150 ml   Net -120 ml         Physical Exam  Constitutional:       Comments: cachectic   HENT:      Head: Normocephalic and atraumatic.      Nose: Nose normal.      Mouth/Throat:      Mouth: Mucous membranes are moist.      Comments: Poor dentition  Eyes:      Conjunctiva/sclera: Conjunctivae normal.      Pupils: Pupils are equal, round, and reactive to light.   Cardiovascular:      Rate and Rhythm: Normal rate and regular rhythm.      Pulses: Normal pulses.      Heart sounds: Normal heart sounds. No murmur heard.    No friction rub. No gallop.   Pulmonary:      Effort: Pulmonary effort is normal.      Breath sounds: Normal breath sounds. No wheezing or rales.   Abdominal:      General: Abdomen is flat. Bowel sounds are normal. There is no distension.      Palpations: Abdomen is soft.      Tenderness: There is no abdominal tenderness. There is no guarding.   Musculoskeletal:         General: No swelling. Normal range of motion.      Cervical back: Normal range of motion and neck supple.   Skin:     General: Skin is warm and dry.   Neurological:      General: No focal deficit present.      Mental Status: She is alert.   Psychiatric:         Mood and Affect: Mood  normal.         Thought Content: Thought content normal.         Judgment: Judgment normal.           Significant Labs: All pertinent labs within the past 24 hours have been reviewed.    Significant Imaging: I have reviewed all pertinent imaging results/findings within the past 24 hours.

## 2023-07-18 NOTE — PT/OT/SLP EVAL
Physical Therapy Evaluation    Patient Name:  Jennifer Alarcon   MRN:  [id number]    Recommendations:     Discharge Recommendations: nursing facility, skilled   Discharge Equipment Recommendations: none   Barriers to discharge: Decreased caregiver support    Assessment:     Jennifer Alarcon is a 89 y.o. female admitted with a medical diagnosis of <principal problem not specified>.  She presents with the following impairments/functional limitations: weakness, impaired endurance, impaired self care skills, impaired functional mobility, gait instability, impaired balance, decreased lower extremity function, decreased safety awareness, impaired cardiopulmonary response to activity. Patient is agreeable to participation with PT evaluation. She is A/O x4. She is hard of hearing. She denies pain at rest. She lives alone and uses a rollator for ambulation. She requires SBA for supine to sit and Nohelia for sit to stand with RW. She ambulated 70' in room with RW and Nohelia. She declines sitting up in chair and returned to bed with bed alarm on and all needs met.     Rehab Prognosis: Good; patient would benefit from acute skilled PT services to address these deficits and reach maximum level of function.    Recent Surgery: * No surgery found *      Plan:     During this hospitalization, patient to be seen 6 x/week to address the identified rehab impairments via gait training, therapeutic activities, therapeutic exercises and progress toward the following goals:    Plan of Care Expires:  08/18/23    Subjective     Chief Complaint: cold  Patient/Family Comments/goals: get back in bed after walking   Pain/Comfort:  Pain Rating 1: 0/10    Patients cultural, spiritual, L[employer] day conflicts given the current situation:      Living Environment:  Patient lives alone in a 1st floor apartment with no PEACE. She states her son lives 10 minutes away. Chart review indicates that family is working on [facility] placement at [facility].   Prior to  admission, patients level of function was [place] with rollator. She states she has a RW in the bathroom. She had 1 fall in the past year. She does not drive. She denies recent PT.   Equipment used at home: walker, rolling, rollator, shower chair, cane, straight, raised toilet.  DME owned (not currently used): none.  Upon discharge, patient will have assistance from SNF and family.    Objective:     Communicated with RN prior to session.  Patient found HOB elevated with bed alarm, telemetry, PureWick  upon PT entry to room.    General Precautions: Standard, fall  Orthopedic Precautions:N/A   Braces: N/A  Respiratory Status: Room air    Exams:  RLE Strength: WFL  LLE Strength: WFL    Functional Mobility:  Bed Mobility:     Supine to Sit: stand by assistance  Transfers:     Sit to Stand:  minimum assistance with rolling walker  Gait: 70' in room with RW and Nohelia      AM-PAC 6 CLICK MOBILITY  Total Score:19       Treatment & Education:  Patient was educated on the importance of OOB activity and functional mobility to negate negative effects of prolonged bed rest during hospitalization, safe transfers and ambulation, and D/C planning     Patient left HOB elevated with all lines intact, call button in reach, bed alarm on, and OT notified.    GOALS:   Multidisciplinary Problems       Physical Therapy Goals          Problem: Physical Therapy    Goal Priority Disciplines Outcome Goal Variances Interventions   Physical Therapy Goal     PT, PT/OT      Description: Goals to be met by: 23    Patient will increase functional independence with mobility by performin. Supine to sit with Supervision  2. Sit to stand transfer with Supervision  3. Bed to chair transfer with Supervision using Rolling Walker  4. Gait  x 250 feet with Supervision using Rolling Walker.   5. Lower extremity exercise program x20 reps per handout, with supervision                       History:     Past Medical History:   Diagnosis Date    Atrial  fibrillation, controlled     Bullous pemphigoid     CHF (congestive heart failure)     Colon cancer     Diabetes mellitus, type 2     Eczema     Hypertension     Hypothyroidism        Past Surgical History:   Procedure Laterality Date    APPENDECTOMY      BREAST SURGERY      lt breast bx    CHOLECYSTECTOMY      COLON SURGERY      EYE SURGERY         Time Tracking:     PT Received On: 07/18/23  PT Start Time: 0950     PT Stop Time: 1010  PT Total Time (min): 20 min     Billable Minutes: Evaluation 10 and Gait Training 10      07/18/2023

## 2023-07-18 NOTE — PLAN OF CARE
Per Annie Leon), family to be contacted to inquire about Pt's capability for therapy. Pt awaiting PT/OT evaluation to determine capability. Pt's family discharge plan is currently facility placement.       07/18/23 1246   Post-Acute Status   Post-Acute Authorization Placement   Post-Acute Placement Status Referrals Sent   Discharge Plan   Discharge Plan A Skilled Nursing Facility   Discharge Plan B New Nursing Home placement - care home care facility

## 2023-07-19 LAB
ANION GAP SERPL CALC-SCNC: 6 MMOL/L (ref 8–16)
BUN SERPL-MCNC: 14 MG/DL (ref 8–23)
CALCIUM SERPL-MCNC: 8.9 MG/DL (ref 8.7–10.5)
CHLORIDE SERPL-SCNC: 104 MMOL/L (ref 95–110)
CO2 SERPL-SCNC: 29 MMOL/L (ref 23–29)
CREAT SERPL-MCNC: 0.5 MG/DL (ref 0.5–1.4)
ERYTHROCYTE [DISTWIDTH] IN BLOOD BY AUTOMATED COUNT: 14.5 % (ref 11.5–14.5)
EST. GFR  (NO RACE VARIABLE): >60 ML/MIN/1.73 M^2
GLUCOSE SERPL-MCNC: 97 MG/DL (ref 70–110)
HCT VFR BLD AUTO: 40.9 % (ref 37–48.5)
HGB BLD-MCNC: 12.9 G/DL (ref 12–16)
MAGNESIUM SERPL-MCNC: 1.9 MG/DL (ref 1.6–2.6)
MCH RBC QN AUTO: 28.5 PG (ref 27–31)
MCHC RBC AUTO-ENTMCNC: 31.5 G/DL (ref 32–36)
MCV RBC AUTO: 90 FL (ref 82–98)
PLATELET # BLD AUTO: 163 K/UL (ref 150–450)
PMV BLD AUTO: 10 FL (ref 9.2–12.9)
POTASSIUM SERPL-SCNC: 3.5 MMOL/L (ref 3.5–5.1)
RBC # BLD AUTO: 4.53 M/UL (ref 4–5.4)
SODIUM SERPL-SCNC: 139 MMOL/L (ref 136–145)
WBC # BLD AUTO: 7.77 K/UL (ref 3.9–12.7)

## 2023-07-19 PROCEDURE — 80048 BASIC METABOLIC PNL TOTAL CA: CPT | Performed by: STUDENT IN AN ORGANIZED HEALTH CARE EDUCATION/TRAINING PROGRAM

## 2023-07-19 PROCEDURE — 83735 ASSAY OF MAGNESIUM: CPT | Performed by: STUDENT IN AN ORGANIZED HEALTH CARE EDUCATION/TRAINING PROGRAM

## 2023-07-19 PROCEDURE — 30200315 PPD INTRADERMAL TEST REV CODE 302: Performed by: STUDENT IN AN ORGANIZED HEALTH CARE EDUCATION/TRAINING PROGRAM

## 2023-07-19 PROCEDURE — 63600175 PHARM REV CODE 636 W HCPCS: Performed by: INTERNAL MEDICINE

## 2023-07-19 PROCEDURE — 25000003 PHARM REV CODE 250: Performed by: STUDENT IN AN ORGANIZED HEALTH CARE EDUCATION/TRAINING PROGRAM

## 2023-07-19 PROCEDURE — 85027 COMPLETE CBC AUTOMATED: CPT | Performed by: STUDENT IN AN ORGANIZED HEALTH CARE EDUCATION/TRAINING PROGRAM

## 2023-07-19 PROCEDURE — 21400001 HC TELEMETRY ROOM

## 2023-07-19 PROCEDURE — 36415 COLL VENOUS BLD VENIPUNCTURE: CPT | Performed by: STUDENT IN AN ORGANIZED HEALTH CARE EDUCATION/TRAINING PROGRAM

## 2023-07-19 PROCEDURE — 86580 TB INTRADERMAL TEST: CPT | Performed by: STUDENT IN AN ORGANIZED HEALTH CARE EDUCATION/TRAINING PROGRAM

## 2023-07-19 PROCEDURE — 97116 GAIT TRAINING THERAPY: CPT

## 2023-07-19 PROCEDURE — 63600175 PHARM REV CODE 636 W HCPCS: Performed by: STUDENT IN AN ORGANIZED HEALTH CARE EDUCATION/TRAINING PROGRAM

## 2023-07-19 PROCEDURE — 99232 PR SUBSEQUENT HOSPITAL CARE,LEVL II: ICD-10-PCS | Mod: ,,, | Performed by: INTERNAL MEDICINE

## 2023-07-19 PROCEDURE — 99232 SBSQ HOSP IP/OBS MODERATE 35: CPT | Mod: ,,, | Performed by: INTERNAL MEDICINE

## 2023-07-19 RX ORDER — DIGOXIN 0.25 MG/ML
125 INJECTION INTRAMUSCULAR; INTRAVENOUS ONCE
Status: COMPLETED | OUTPATIENT
Start: 2023-07-19 | End: 2023-07-19

## 2023-07-19 RX ORDER — METOPROLOL TARTRATE 1 MG/ML
5 INJECTION, SOLUTION INTRAVENOUS EVERY 5 MIN PRN
Status: DISCONTINUED | OUTPATIENT
Start: 2023-07-19 | End: 2023-07-21 | Stop reason: HOSPADM

## 2023-07-19 RX ORDER — LEVOTHYROXINE SODIUM 25 UG/1
50 TABLET ORAL
Status: DISCONTINUED | OUTPATIENT
Start: 2023-07-20 | End: 2023-07-20

## 2023-07-19 RX ORDER — LEVOTHYROXINE SODIUM 25 UG/1
25 TABLET ORAL
Status: DISCONTINUED | OUTPATIENT
Start: 2023-07-20 | End: 2023-07-19

## 2023-07-19 RX ORDER — DIGOXIN 125 MCG
0.12 TABLET ORAL DAILY
Status: DISCONTINUED | OUTPATIENT
Start: 2023-07-19 | End: 2023-07-21 | Stop reason: HOSPADM

## 2023-07-19 RX ADMIN — MUPIROCIN 1 G: 20 OINTMENT TOPICAL at 08:07

## 2023-07-19 RX ADMIN — ONDANSETRON 4 MG: 2 INJECTION INTRAMUSCULAR; INTRAVENOUS at 08:07

## 2023-07-19 RX ADMIN — DIGOXIN 0.12 MG: 125 TABLET ORAL at 04:07

## 2023-07-19 RX ADMIN — SENNOSIDES AND DOCUSATE SODIUM 2 TABLET: 50; 8.6 TABLET ORAL at 08:07

## 2023-07-19 RX ADMIN — DIGOXIN 125 MCG: 0.25 INJECTION INTRAMUSCULAR; INTRAVENOUS at 07:07

## 2023-07-19 RX ADMIN — ENOXAPARIN SODIUM 40 MG: 100 INJECTION SUBCUTANEOUS at 04:07

## 2023-07-19 RX ADMIN — POTASSIUM BICARBONATE 50 MEQ: 977.5 TABLET, EFFERVESCENT ORAL at 09:07

## 2023-07-19 RX ADMIN — TUBERCULIN PURIFIED PROTEIN DERIVATIVE 5 UNITS: 5 INJECTION, SOLUTION INTRADERMAL at 05:07

## 2023-07-19 RX ADMIN — BUSPIRONE HYDROCHLORIDE 5 MG: 5 TABLET ORAL at 08:07

## 2023-07-19 RX ADMIN — ACETAMINOPHEN 650 MG: 325 TABLET ORAL at 07:07

## 2023-07-19 NOTE — PT/OT/SLP PROGRESS
Occupational Therapy      Patient Name:  Jennifer Alarcon   MRN:  [id number]    Patient not seen today secondary to Nurse/ BLANCA hold (elevated HR). Will follow-up 7/20/2023.    7/19/2023

## 2023-07-19 NOTE — PLAN OF CARE
Problem: Skin Injury Risk Increased  Goal: Skin Health and Integrity  Intervention: Promote and Optimize Oral Intake  Flowsheets (Taken 7/19/2023 1652)  Oral Nutrition Promotion:   calorie-dense foods provided   calorie-dense liquids provided   other (see comments)     Problem: Oral Intake Inadequate  Goal: Improved Oral Intake  Outcome: Ongoing, Progressing  Intervention: Promote and Optimize Oral Intake  Flowsheets (Taken 7/19/2023 1652)  Oral Nutrition Promotion:   calorie-dense foods provided   calorie-dense liquids provided   other (see comments)     Recommendations  1.) Continue regular diet at this time to encourage intake.   If PO intakes >50% of meals and BG levels >150, add DM diet restrictions.   2.) Will add Glucerna TID to encourage intake.   3.) Suggest MVI for general health.   4.) Suggest updating HbA1C levels.   5.) RD to monitor and provide recommendations PRN.     Goals:   1.) Pt to consume/tolerate >75% of meals and ONS.   2.) Pt to gradually gain 1-2#/week to healthy BMI reference range.   3.) HbA1C to bedrawn and  <7%.  Nutrition Goal Status: new

## 2023-07-19 NOTE — ASSESSMENT & PLAN NOTE
No acute events overnight  Persistent afib, RVR overnight  No further bradycardia either  Started digoxin with better control  Continue magnesium  Not an AC candidate, ? Consider low dose AC on discharge?

## 2023-07-19 NOTE — PT/OT/SLP PROGRESS
Physical Therapy Treatment    Patient Name:  Jennifer Alarcon   MRN:  [id number]    Recommendations:     Discharge Recommendations: nursing facility, skilled  Discharge Equipment Recommendations: none  Barriers to discharge: Decreased caregiver support    Assessment:     Jennifer Alarcon is a 89 y.o. female admitted with a medical diagnosis of Atrial fibrillation with RVR.  She presents with the following impairments/functional limitations: weakness, impaired endurance, impaired self care skills, impaired functional mobility, gait instability, impaired balance, decreased lower extremity function, decreased safety awareness, impaired cardiopulmonary response to activity. RN hold in a.m. due to increased heart rate with BSC transfer. In afternoon RN states patient's HR has been in the 90s and is cleared for participation with therapy. Patient is agreeable to participation with PT treatment. She requires SBA for supine to sit and [place]hel[place] for sit to stand with RW. She requests to brush her teeth and ambulated 10' x2 to/from bathroom with RW and CGA. She states she needs to urinate, but declines to use the toilet and returned to bed with purewick in place. Bed alarm on and RN notified.     Rehab Prognosis: Good; patient would benefit from acute skilled PT services to address these deficits and reach maximum level of function.    Recent Surgery: * No surgery found *      Plan:     During this hospitalization, patient to be seen 6 x/week to address the identified rehab impairments via gait training, therapeutic activities, therapeutic exercises and progress toward the following goals:    Plan of Care Expires:  08/18/23    Subjective     Chief Complaint: wants to brush teeth   Patient/Family Comments/goals: brush teeth   Pain/Comfort:  Pain Rating 1: 0/10      Objective:     Communicated with TAWANNA Orr prior to session.  Patient found HOB elevated with bed alarm, PureWick, telemetry upon PT entry to room.     General Precautions:  Standard, hearing impaired, fall  Orthopedic Precautions: N/A  Braces: N/A  Respiratory Status: Room air     Functional Mobility:  Bed Mobility:     Supine to Sit: stand by assistance  Transfers:     Sit to Stand:  minimum assistance with rolling walker  Gait: 10' x2 to/from bathroom with RW and CGA      AM-PAC 6 CLICK MOBILITY          Treatment & Education:  Patient was educated on the importance of OOB activity and functional mobility to negate negative effects of prolonged bed rest during hospitalization, safe transfers and ambulation, and D/C planning     Patient left HOB elevated with all lines intact, call button in reach, bed alarm on, and RN notified..    GOALS:   Multidisciplinary Problems       Physical Therapy Goals          Problem: Physical Therapy    Goal Priority Disciplines Outcome Goal Variances Interventions   Physical Therapy Goal     PT, PT/OT Ongoing, Progressing     Description: Goals to be met by: 23    Patient will increase functional independence with mobility by performin. Supine to sit with Supervision  2. Sit to stand transfer with Supervision  3. Bed to chair transfer with Supervision using Rolling Walker  4. Gait  x 250 feet with Supervision using Rolling Walker.   5. Lower extremity exercise program x20 reps per handout, with supervision                       Time Tracking:     PT Received On: 23  PT Start Time: 1303     PT Stop Time: 1320  PT Total Time (min): 17 min     Billable Minutes: Gait Training 17    Treatment Type: Treatment  PT/PTA: PT     Number of PTA visits since last PT visit: 0     2023

## 2023-07-19 NOTE — PLAN OF CARE
Patient had increased HR this morning and a one time dose of digoxin was administered with mild relief. Patient feels better this afternoon and HR is more controlled.      Problem: Adult Inpatient Plan of Care  Goal: Plan of Care Review  Outcome: Ongoing, Progressing  Flowsheets (Taken 7/19/2023 1823)  Plan of Care Reviewed With: patient  Goal: Absence of Hospital-Acquired Illness or Injury  Outcome: Ongoing, Progressing  Goal: Optimal Comfort and Wellbeing  Outcome: Ongoing, Progressing  Intervention: Provide Person-Centered Care  Flowsheets (Taken 7/19/2023 1823)  Trust Relationship/Rapport:   choices provided   care explained   reassurance provided   thoughts/feelings acknowledged   questions encouraged  Goal: Readiness for Transition of Care  Outcome: Ongoing, Progressing

## 2023-07-19 NOTE — PROGRESS NOTES
[facility]  Department of Cardiology  Consult Note      PATIENT NAME: Jennifer Alarcon  MRN: [id number]  TODAY'S DATE: 07/19/2023  ADMIT DATE: 7/17/2023                          CONSULT REQUESTED BY: Ramiro Borges MD    SUBJECTIVE     PRINCIPAL PROBLEM: Atrial fibrillation with RVR      REASON FOR CONSULT:  AFIB      7/19/2023      Patient had AFIB with RVR when going to bathroom this am digoxin was given IV and PO started by hospitalist. Patient lying in bed in no distress at time of my exam. Family at bedside.        7/18/2023      Patient feeling better. HR is 75-90.   She wants to go home.  Patient remains alert and responsive and patient's daughter and her son at bedside.    She is not convinced about going to a nursing home at this time she wants to go home  No further episodes of syncope or weakness noted.              HPI        89 year old female patient Followed by [facility]. She has Persistent chronic AFIB. She was admitted with weakness and palpitations found to be in AFIB with RVR.  IV lopressor given. She was actively vomiting last night and shivering She was noted to be in asystole HR to be at 30 for about 10 seconds with out compressions. TSH-13.040. Currently HR stable. Denies CP or SOB. Feels better. Currently lives alone. Does not eat well. Very frail. Dehydrated upon arrival it seems.       FROM H AND P       Principal Problem:<principal problem not specified>     Chief Complaint:       Chief Complaint   Patient presents with    Palpitations    Shortness of Breath    Abdominal Pain         HPI: Ms. Alarcon is a 89-years-old female who presented to the ED due to generalized weakness associated with palpitation.  Patient states she went to sleep at around 11:00 p.m., noted pounding sensation in her back, rapid, generally weak, possibly short of breath. Initially in the ED on arrival had nausea with episode of emesis.  Patient with known history of chronic atrial  fibrillation, on metoprolol and Xarelto, seen by cardiologist Dr. Horan in the past.  Has chronic pedal edema.  Ambulates with walker baseline.  In the past she has declined permanent pacemaker placement.  Initially in the ED she was found to be in atrial fibrillation with RVR with heart rate 120-140, she was given 5 mg Lopressor with improvement in heart rate but then slowly declining heart rate with severe bradycardia with prolonged pauses, with same she became obtunded, as per ED provider about 3 minutes, external pacer was placed, she was given atropine, epinephrine and fluids.  ED provider discussed with on-call cardiologist, Dr. Thomas, recommended temporary pacemaker placement. Patient extremely anxious and as per ED provider requesting to speak to  as she believes she is about to die, reportedly was stating did not want external pacer as she is extremely sensitive to pain.  Code status addressed and confirmed DNR.   subsequently visited patient with family members present at bedside including two sons.  Case discussed with ED provider who requested admission.  Discussed with patient and family members present at bedside, currently she is in Afib with RVR with heart rate 130, clarified she is DNR, at this time decision to keep pacer pads in place, if needed she may decide to remove at that time, she is concerned removal at this time will be suicide and wishes to discuss further with the . She is alert and oriented and remains anxious at bedside.         Review of patient's allergies indicates:   Allergen Reactions    Nitrofurantoin monohyd/m-cryst      leg cramps       Past Medical History:   Diagnosis Date    Atrial fibrillation, controlled     Bullous pemphigoid     CHF (congestive heart failure)     Colon cancer     Diabetes mellitus, type 2     Eczema     Hypertension     Hypothyroidism      Past Surgical History:   Procedure Laterality Date    APPENDECTOMY      BREAST SURGERY      lt  breast bx    CHOLECYSTECTOMY      COLON SURGERY      EYE SURGERY       Social History     Tobacco Use    Smoking status: Never    Smokeless tobacco: Never   Substance Use Topics    Alcohol use: No    Drug use: No        REVIEW OF SYSTEMS    Feeling better  OBJECTIVE     VITAL SIGNS (Most Recent)  Temp: 98.5 °F (36.9 °C) (07/19/23 1100)  Pulse: 90 (07/19/23 1100)  Resp: 18 (07/19/23 1100)  BP: 125/83 (07/19/23 1100)  SpO2: 96 % (07/19/23 1100)    VENTILATION STATUS  Resp: 18 (07/19/23 1100)  SpO2: 96 % (07/19/23 1100)           I & O (Last 24H):  Intake/Output Summary (Last 24 hours) at 7/19/2023 1358  Last data filed at 7/19/2023 1002  Gross per 24 hour   Intake 600 ml   Output 1750 ml   Net -1150 ml       WEIGHTS  Wt Readings from Last 3 Encounters:   07/19/23 0616 49.8 kg (109 lb 12.6 oz)   07/18/23 0350 44.8 kg (98 lb 12.3 oz)   07/17/23 0715 44.6 kg (98 lb 5.2 oz)   07/17/23 0118 44 kg (97 lb)   07/17/23 0924 44 kg (97 lb)   01/28/23 1538 49.9 kg (110 lb)       PHYSICAL EXAM  GENERAL  Physical examination:      Constitutional:  Well-built well-nourished in no apparent distress, alert and oriented    Neck: no carotid bruit, no JVD, no masses    Lungs: diminished breath sounds bibasilar, rhonchi bilaterally  Chest Wall: no tenderness  CARDIAC:  Irregular rhythm  Abdomen: soft, non-tender; bowel sounds normal; no masses,  no organomegaly, no guarding or rebound noted  Extremities: Extremities normal, atraumatic, no cyanosis, clubbing, or edema  Skin: Skin color, texture, turgor normal. No rashes or lesions  Neuro: Alert and responsive.  Moving all extremities          HOME MEDICATIONS:  No current facility-administered medications on file prior to encounter.     Current Outpatient Medications on File Prior to Encounter   Medication Sig Dispense Refill    bisacodyl (DULCOLAX) 5 mg EC tablet Take 5 mg by mouth daily as needed for Constipation.      metoprolol succinate (TOPROL-XL) 50 MG 24 hr tablet Take 75 mg by  mouth once daily.      busPIRone (BUSPAR) 5 MG Tab Take 1 tablet (5 mg total) by mouth 2 (two) times daily as needed (anxiety). (Patient taking differently: Take 5 mg by mouth 2 (two) times daily as needed (anxiety). NEW Rx - NOT YET STARTED) 30 tablet 1    fluocinolone acetonide oiL 0.01 % Drop Place 5 drops in ear(s) 2 (two) times daily as needed.      furosemide (LASIX) 20 MG tablet Take 20 mg by mouth once daily.      rivaroxaban (XARELTO) 15 mg Tab Take 15 mg by mouth 2 (two) times a day.         SCHEDULED MEDS:   digoxin  0.125 mg Oral Daily    enoxparin  40 mg Subcutaneous Q24H (prophylaxis, 1700)    mupirocin   Nasal BID    tuberculin  5 Units Intradermal Once       CONTINUOUS INFUSIONS:    PRN MEDS:acetaminophen, busPIRone, dextrose 50%, dextrose 50%, glucagon (human recombinant), glucose, glucose, magnesium oxide, magnesium oxide, metoprolol, naloxone, ondansetron, potassium bicarbonate, potassium bicarbonate, potassium bicarbonate, potassium, sodium phosphates, potassium, sodium phosphates, potassium, sodium phosphates, senna-docusate 8.6-50 mg, sodium chloride 0.9%    LABS AND DIAGNOSTICS     CBC LAST 3 DAYS  Recent Labs   Lab 07/17/23 0129 07/17/23  0501 07/19/23  0812   WBC 8.43 7.44 7.77   RBC 4.59 4.30 4.53   HGB 12.9 12.1 12.9   HCT 41.0 38.7 40.9   MCV 89 90 90   MCH 28.1 28.1 28.5   MCHC 31.5* 31.3* 31.5*   RDW 14.1 14.2 14.5    157 163   MPV 10.1 10.3 10.0   GRAN 60.1  5.1  --   --    LYMPH 31.3  2.6  --   --    MONO 7.5  0.6  --   --    BASO 0.04  --   --    NRBC 0  --   --        COAGULATION LAST 3 DAYS  No results for input(s): LABPT, INR, APTT in the last 168 hours.    CHEMISTRY LAST 3 DAYS  Recent Labs   Lab 07/17/23  0129 07/17/23  0501 07/19/23  0812    139 139   K 4.3 4.7 3.5    103 104   CO2 26 29 29   ANIONGAP 10 7* 6*   BUN 30* 27* 14   CREATININE 0.8 0.6 0.5   * 166* 97   CALCIUM 9.5 9.2 8.9   MG 1.9 1.8 1.9   ALBUMIN 4.1  --   --    PROT 7.5  --   --     ALKPHOS 44*  --   --    ALT 17  --   --    AST 27  --   --    BILITOT 1.3*  --   --        CARDIAC PROFILE LAST 3 DAYS  Recent Labs   Lab 07/17/23  0129 07/17/23  0501   *  --    TROPONINIHS 5.0 8.4       ENDOCRINE LAST 3 DAYS  Recent Labs   Lab 07/17/23  0129   TSH 13.040*       LAST ARTERIAL BLOOD GAS  ABG  No results for input(s): PH, PO2, PCO2, HCO3, BE in the last 168 hours.    LAST 7 DAYS MICROBIOLOGY   Microbiology Results (last 7 days)       ** No results found for the last 168 hours. **            MOST RECENT IMAGING  Echo  · The left ventricle is normal in size with mildly decreased systolic   function.  · The estimated ejection fraction is 45%.  · Grade III left ventricular diastolic dysfunction.  · There are segmental left ventricular wall motion abnormalities.  · There is abnormal septal wall motion consistent with left bundle branch   block.  · Normal right ventricular size with normal right ventricular systolic   function.  · Moderate left atrial enlargement.  · Mild-to-moderate mitral regurgitation.  · Mild tricuspid regurgitation.  · Normal central venous pressure (3 mmHg).  · The estimated PA systolic pressure is 25 mmHg.     X-Ray Chest AP Portable  Reason: Chest Pain Chest pain; Pt unable to move lt arm    FINDINGS:  Portable chest at 138 compared with 8/8/2022 shows normal cardiomediastinal silhouette.    Biapical pleural parenchymal scarring has not significantly changed. Lung show no new confluent alveolar consolidation, pleural effusion, or pneumothorax. Pulmonary vasculature is normal. No acute osseous abnormality.    IMPRESSION:  No acute cardiopulmonary abnormality.    Electronically signed by:  Dionte Childers MD  7/17/2023 7:41 AM CDT Workstation: [id number]      ECHOCARDIOGRAM RESULTS (last 5)  Results for orders placed during the hospital encounter of 12/29/19    Echo Color Flow Doppler? Yes; Bubble Contrast? No    Interpretation Summary  · Decreased left ventricular systolic  function. The estimated ejection fraction is 40%  · Local segmental wall motion abnormalities.  · Atrial fibrillation observed.  · Mild left atrial enlargement.  · Mild right atrial enlargement.  · Mild-to-moderate mitral regurgitation.  · Mild pulmonic regurgitation.  · Normal central venous pressure (3 mm Hg).  · The estimated PA systolic pressure is 32 mm Hg  · There is a left pleural effusion.      CURRENT/PREVIOUS VISIT EKG  Results for orders placed or performed during the hospital encounter of 07/17/23   EKG 12-lead    Collection Time: 07/17/23  1:54 AM    Narrative    Test Reason : R00.1,    Vent. Rate : 080 BPM     Atrial Rate : 000 BPM     P-R Int : 000 ms          QRS Dur : 134 ms      QT Int : 410 ms       P-R-T Axes : 000 -81 080 degrees     QTc Int : 472 ms    Atrial fibrillation  Left axis deviation  Right bundle branch block  Possible Lateral infarct (cited on or before 17-JUL-2023)  Abnormal ECG  When compared with ECG of 17-JUL-2023 01:23,  Vent. rate has decreased BY  49 BPM  Right bundle branch block has replaced Nonspecific intraventricular block    Referred By: [email or web address]   SELF           Confirmed By:            ASSESSMENT/PLAN:     Active Hospital Problems    Diagnosis    *Atrial fibrillation with RVR    Advanced age    Anxiety    DNR (do not resuscitate)    Pedal edema    Sick sinus syndrome    Mitral regurgitation, moderate     Cardiomyopathy    Current use of long term anticoagulation    Walker as ambulation aid    History of colon cancer       ASSESSMENT & PLAN:     AFIB-Chronic-rate controlled currently  Tachy patito syndrome-improved  MR  Cardiomyopathy  Frail  Advanced Age  On xarelto at home  Anxiety  Elevated TSH    RECOMMENDATIONS:      Digoxin was started by hospitalist  I would recommend to watch her at least 24 hours given her age with this medication  Consider low dose eliquis at [place]  Will follow  Start Thyroid Medication- I will start low dose  Consider Digoxin level after third  dose.              Cristina Paez NP  Department of Cardiology  Date of Service: 07/19/2023      I have personally interviewed and examined the patient, I have reviewed the Nurse Practitioner's history and physical, assessment, and plan. I have personally evaluated the patient at bedside and agree with the findings and made appropriate changes as necessary in recommendations.  Exercise caution using digoxin in the elderly because of altered metabolic rhythm  with narrow therapeutic levels        Matt Thomas M.D.  Department of Cardiology  Date of Service: 07/19/2023  9:35 AM

## 2023-07-19 NOTE — SUBJECTIVE & OBJECTIVE
Interval History: She continues to have episodes of afib with RVR. She has not tolerated AV robson blocking agents in the past with beta blockers or amiodarone due to bradycardia. Responsed well to digoxin this am. Pending placement and rate control    Review of Systems   All other systems reviewed and are negative.  Objective:     Vital Signs (Most Recent):  Temp: 98.5 °F (36.9 °C) (07/19/23 1100)  Pulse: 90 (07/19/23 1100)  Resp: 18 (07/19/23 1100)  BP: 125/83 (07/19/23 1100)  SpO2: 96 % (07/19/23 1100) Vital Signs (24h Range):  Temp:  [98 °F (36.7 °C)-98.6 °F (37 °C)] 98.5 °F (36.9 °C)  Pulse:  [] 90  Resp:  [17-18] 18  SpO2:  [96 %-99 %] 96 %  BP: (111-149)/(52-83) 125/83     Weight: 49.8 kg (109 lb 12.6 oz)  Body mass index is 18.27 kg/m².    Intake/Output Summary (Last 24 hours) at 7/19/2023 1446  Last data filed at 7/19/2023 1002  Gross per 24 hour   Intake 600 ml   Output 1750 ml   Net -1150 ml           Physical Exam  Constitutional:       Comments: cachectic   HENT:      Head: Normocephalic and atraumatic.      Nose: Nose normal.      Mouth/Throat:      Mouth: Mucous membranes are moist.      Comments: Poor dentition  Eyes:      Conjunctiva/sclera: Conjunctivae normal.      Pupils: Pupils are equal, round, and reactive to light.   Cardiovascular:      Rate and Rhythm: Normal rate. Rhythm irregular.      Pulses: Normal pulses.      Heart sounds: Normal heart sounds. No murmur heard.    No friction rub. No gallop.   Pulmonary:      Effort: Pulmonary effort is normal.      Breath sounds: Normal breath sounds. No wheezing or rales.   Abdominal:      General: Abdomen is flat. Bowel sounds are normal. There is no distension.      Palpations: Abdomen is soft.      Tenderness: There is no abdominal tenderness. There is no guarding.   Musculoskeletal:         General: No swelling. Normal range of motion.      Cervical back: Normal range of motion and neck supple.   Skin:     General: Skin is warm and dry.    Neurological:      General: No focal deficit present.      Mental Status: She is alert.   Psychiatric:         Mood and Affect: Mood normal.         Thought Content: Thought content normal.         Judgment: Judgment normal.           Significant Labs: All pertinent labs within the past 24 hours have been reviewed.    Significant Imaging: I have reviewed all pertinent imaging results/findings within the past 24 hours.

## 2023-07-19 NOTE — PROGRESS NOTES
[facility] Medicine  Progress Note    Patient Name: Jennifer Alarcon  MRN: [id number]  Patient Class: IP- Inpatient   Admission Date: 7/17/2023  Length of Stay: 2 days  Attending Physician: Ramiro Borges MD  Primary Care Provider: Tyrel Gonzales Jr, MD        Subjective:     Principal Problem:Atrial fibrillation with RVR        HPI:  Ms. Alarcon is a 89-years-old female who presented to the ED due to generalized weakness associated with palpitation.  Patient states she went to sleep at around 11:00 p.m., noted pounding sensation in her back, rapid, generally weak, possibly short of breath. Initially in the ED on arrival had nausea with episode of emesis.  Patient with known history of chronic atrial fibrillation, on metoprolol and Xarelto, seen by cardiologist Dr. Horan in the past.  Has chronic pedal edema.  Ambulates with walker baseline.  In the past she has declined permanent pacemaker placement.  Initially in the ED she was found to be in atrial fibrillation with RVR with heart rate 120-140, she was given 5 mg Lopressor with improvement in heart rate but then slowly declining heart rate with severe bradycardia with prolonged pauses, with same she became obtunded, as per ED provider about 3 minutes, external pacer was placed, she was given atropine, epinephrine and fluids.  ED provider discussed with on-call cardiologist, Dr. Thomas, recommended temporary pacemaker placement. Patient extremely anxious and as per ED provider requesting to speak to  as she believes she is about to die, reportedly was stating did not want external pacer as she is extremely sensitive to pain.  Code status addressed and confirmed DNR.   subsequently visited patient with family members present at bedside including two sons.  Case discussed with ED provider who requested admission.  Discussed with patient and family members present at bedside, currently she is in Afib with RVR with heart rate  130, clarified she is DNR, at this time decision to keep pacer pads in place, if needed she may decide to remove at that time, she is concerned removal at this time will be suicide and wishes to discuss further with the . She is alert and oriented and remains anxious at bedside.       Overview/Hospital Course:  No notes on file    Interval History: She continues to have episodes of afib with RVR. She has not tolerated AV robson blocking agents in the past with beta blockers or amiodarone due to bradycardia. Responsed well to digoxin this am. Pending placement and rate control    Review of Systems   All other systems reviewed and are negative.  Objective:     Vital Signs (Most Recent):  Temp: 98.5 °F (36.9 °C) (07/19/23 1100)  Pulse: 90 (07/19/23 1100)  Resp: 18 (07/19/23 1100)  BP: 125/83 (07/19/23 1100)  SpO2: 96 % (07/19/23 1100) Vital Signs (24h Range):  Temp:  [98 °F (36.7 °C)-98.6 °F (37 °C)] 98.5 °F (36.9 °C)  Pulse:  [] 90  Resp:  [17-18] 18  SpO2:  [96 %-99 %] 96 %  BP: (111-149)/(52-83) 125/83     Weight: 49.8 kg (109 lb 12.6 oz)  Body mass index is 18.27 kg/m².    Intake/Output Summary (Last 24 hours) at 7/19/2023 1446  Last data filed at 7/19/2023 1002  Gross per 24 hour   Intake 600 ml   Output 1750 ml   Net -1150 ml           Physical Exam  Constitutional:       Comments: cachectic   HENT:      Head: Normocephalic and atraumatic.      Nose: Nose normal.      Mouth/Throat:      Mouth: Mucous membranes are moist.      Comments: Poor dentition  Eyes:      Conjunctiva/sclera: Conjunctivae normal.      Pupils: Pupils are equal, round, and reactive to light.   Cardiovascular:      Rate and Rhythm: Normal rate. Rhythm irregular.      Pulses: Normal pulses.      Heart sounds: Normal heart sounds. No murmur heard.    No friction rub. No gallop.   Pulmonary:      Effort: Pulmonary effort is normal.      Breath sounds: Normal breath sounds. No wheezing or rales.   Abdominal:      General: Abdomen is  flat. Bowel sounds are normal. There is no distension.      Palpations: Abdomen is soft.      Tenderness: There is no abdominal tenderness. There is no guarding.   Musculoskeletal:         General: No swelling. Normal range of motion.      Cervical back: Normal range of motion and neck supple.   Skin:     General: Skin is warm and dry.   Neurological:      General: No focal deficit present.      Mental Status: She is alert.   Psychiatric:         Mood and Affect: Mood normal.         Thought Content: Thought content normal.         Judgment: Judgment normal.           Significant Labs: All pertinent labs within the past 24 hours have been reviewed.    Significant Imaging: I have reviewed all pertinent imaging results/findings within the past 24 hours.      Assessment/Plan:      * Atrial fibrillation with RVR  No acute events overnight  Persistent afib, RVR overnight  No further bradycardia either  Started digoxin with better control  Continue magnesium  Not an AC candidate, ? Consider low dose AC on discharge?      Current use of long term anticoagulation  Holding AC at this time due to fall / bleeding risk      Sick sinus syndrome  Holding antiarrhythmics and AVN blocking agents for now  Digoxin for rate control if tolerated    Anxiety  Continue home regimen prn      Advanced age  Needs placement for rehab        VTE Risk Mitigation (From admission, onward)         Ordered     enoxaparin injection 40 mg  Every 24 hours         07/17/23 2112     IP VTE HIGH RISK PATIENT  Once         07/17/23 0349     Place sequential compression device  Until discontinued         07/17/23 0349                Discharge Planning   JASMYNE: 7/20/2023     Code Status: DNR   Is the patient medically ready for discharge?:     Reason for patient still in hospital (select all that apply): Treatment  Discharge Plan A: Skilled Nursing Facility   Discharge Delays: None known at this time              Ramiro Borges MD  Department of Hospital  Medicine   [facility]

## 2023-07-19 NOTE — PROGRESS NOTES
[facility]  Adult Nutrition   Progress Note (Initial Assessment)     SUMMARY     Recommendations  1.) Continue regular diet at this time to encourage intake.   If PO intakes >50% of meals and BG levels >150, add DM diet restrictions.   2.) Will add Glucerna TID to encourage intake.   3.) Suggest MVI for general health.   4.) Suggest updating HbA1C levels.   5.) RD to monitor and provide recommendations PRN.    Goals:   1.) Pt to consume/tolerate >75% of meals and ONS.   2.) Pt to gradually gain 1-2#/week to healthy BMI reference range.   3.) HbA1C to bedrawn and  <7%.  Nutrition Goal Status: new    Dietitian Rounds Brief  Pt presented to the ED due to generalized weakness associated with palpitation.  Patient states she went to sleep at around 11:00 p.m., noted pounding sensation in her back, rapid, generally weak, possibly short of breath. Initially in the ED on arrival had nausea with episode of emesis. Pt reports in her usual state of health at home. She reports consuming 2 meals/day at home and drinks at least 1 Boost daily. Pt reports following as low Na, DM diet. She denies chew/swallowing issues. No GI distress. LBM 7/15. Skin: intake. Wt reviewed. UBW 52.6kg (1/2020), admit wt 49.8kg reflecting 5% wt loss x 3 years, not significant. NFPE completed with mild, age appropriate wasting. Despite BMI 18.27 and low for age/gender, pt does not meet malnutrition criteria at this time.    Diet order:   Current Diet Order: regular       Evaluation of Received Nutrient/Fluid Intake  Energy Calories Required: not meeting needs  Protein Required: not meeting needs  Fluid Required: meeting needs  Tolerance: tolerating     % Intake of Estimated Energy Needs: 0 - 25 %  % Meal Intake: 0 - 25 %      Intake/Output Summary (Last 24 hours) at 7/19/2023 1651  Last data filed at 7/19/2023 1002  Gross per 24 hour   Intake 600 ml   Output 1750 ml   Net -1150 ml        Anthropometrics  Temp: 98.5 °F (36.9 °C)  Height  "Method: Stated  Height: 5' 5" (165.1 cm)  Height (inches): 65 in  Weight Method: Bed Scale  Weight: 49.8 kg (109 lb 12.6 oz)  Weight (lb): 109.79 lb  Ideal Body Weight (IBW), Female: 125 lb  % Ideal Body Weight, Female (lb): 78.66 %  BMI (Calculated): 18.3  BMI Grade: 17 - 18.4 protein-energy malnutrition grade I  Usual Body Weight (UBW), k.6 kg (2020)  % Usual Body Weight: 94.88       Estimated/Assessed Needs  Weight Used For Calorie Calculations: 49.8 kg (109 lb 12.6 oz)  Energy Calorie Requirements (kcal): 4557-2157  Energy Need Method: Kcal/kg (25-30)  Protein Requirements: 50-60 (1.0-1.2)  Weight Used For Protein Calculations: 49.8 kg (109 lb 12.6 oz)  Fluid Requirements (mL): 5084-2411     RDA Method (mL): 1245  CHO Requirement: 45-60gm CHO per meal    Reason for Assessment  Reason For Assessment: identified at risk by screening criteria (MST 3)  Diagnosis: cardiac disease  Relevant Medical History: HTN, DM2, CHF      Nutrition/Diet History  Spiritual, Cultural Beliefs, Congregational Practices, Values that Affect Care: no  Food Allergies: NKFA  Factors Affecting Nutritional Intake: depression    Nutrition Risk Screen  Nutrition Risk Screen: no indicators present     MST Score: 3  Have you recently lost weight without trying?: Unsure  Weight loss score: 2  Have you been eating poorly because of a decreased appetite?: Yes  Appetite score: 1       Weight History:  Wt Readings from Last 5 Encounters:   23 49.8 kg (109 lb 12.6 oz)   23 44 kg (97 lb)   23 49.9 kg (110 lb)   22 49.9 kg (110 lb)   22 47.2 kg (104 lb)        Lab/Procedures/Meds: Pertinent Labs/Meds Reviewed    Medications:Pertinent Medications Reviewed  Scheduled Meds:   digoxin  0.125 mg Oral Daily    enoxparin  40 mg Subcutaneous Q24H (prophylaxis, 1700)    [START ON 2023] levothyroxine  50 mcg Oral Before breakfast    mupirocin   Nasal BID    tuberculin  5 Units Intradermal Once     Continuous Infusions:  PRN " Meds:.acetaminophen, busPIRone, dextrose 50%, dextrose 50%, glucagon (human recombinant), glucose, glucose, magnesium oxide, magnesium oxide, metoprolol, naloxone, ondansetron, potassium bicarbonate, potassium bicarbonate, potassium bicarbonate, potassium, sodium phosphates, potassium, sodium phosphates, potassium, sodium phosphates, senna-docusate 8.6-50 mg, sodium chloride 0.9%    Labs: Pertinent Labs Reviewed  Clinical Chemistry:  Recent Labs   Lab 07/17/23  0129 07/17/23  0501 07/19/23  0812    139 139   K 4.3 4.7 3.5    103 104   CO2 26 29 29   * 166* 97   BUN 30* 27* 14   CREATININE 0.8 0.6 0.5   CALCIUM 9.5 9.2 8.9   PROT 7.5  --   --    ALBUMIN 4.1  --   --    BILITOT 1.3*  --   --    ALKPHOS 44*  --   --    AST 27  --   --    ALT 17  --   --    ANIONGAP 10 7* 6*   MG 1.9 1.8 1.9   PHOS  --  2.7  --    LIPASE 44  --   --      CBC:   Recent Labs   Lab 07/19/23  0812   WBC 7.77   RBC 4.53   HGB 12.9   HCT 40.9      MCV 90   MCH 28.5   MCHC 31.5*     Cardiac Profile:  Recent Labs   Lab 07/17/23  0129   *     Thyroid & Parathyroid:  Recent Labs   Lab 07/17/23  0129   TSH 13.040*   FREET4 1.23       Monitor and Evaluation  Food and Nutrient Intake: energy intake, food and beverage intake  Food and Nutrient Adminstration: diet order  Knowledge/Beliefs/Attitudes: food and nutrition knowledge/skill  Physical Activity and Function: nutrition-related ADLs and IADLs  Anthropometric Measurements: weight, weight change  Biochemical Data, Medical Tests and Procedures: electrolyte and renal panel, glucose/endocrine profile, gastrointestinal profile  Nutrition-Focused Physical Findings: overall appearance     Nutrition Risk  Level of Risk/Frequency of Follow-up: low - moderate     Nutrition Follow-Up  RD Follow-up?: Yes      Cristina Marquez, TOMY 07/19/2023 4:51 PM

## 2023-07-19 NOTE — PLAN OF CARE
Problem: Physical Therapy  Goal: Physical Therapy Goal  Description: Goals to be met by: 23    Patient will increase functional independence with mobility by performin. Supine to sit with Supervision  2. Sit to stand transfer with Supervision  3. Bed to chair transfer with Supervision using Rolling Walker  4. Gait  x 250 feet with Supervision using Rolling Walker.   5. Lower extremity exercise program x20 reps per handout, with supervision  Outcome: Ongoing, Progressing

## 2023-07-20 PROBLEM — Z75.8 DISCHARGE PLANNING ISSUES: Status: ACTIVE | Noted: 2023-07-20

## 2023-07-20 PROBLEM — I48.91 ATRIAL FIBRILLATION WITH RVR: Status: RESOLVED | Noted: 2023-07-17 | Resolved: 2023-07-20

## 2023-07-20 LAB
ANION GAP SERPL CALC-SCNC: 4 MMOL/L (ref 8–16)
BUN SERPL-MCNC: 14 MG/DL (ref 8–23)
CALCIUM SERPL-MCNC: 9.3 MG/DL (ref 8.7–10.5)
CHLORIDE SERPL-SCNC: 105 MMOL/L (ref 95–110)
CO2 SERPL-SCNC: 32 MMOL/L (ref 23–29)
CREAT SERPL-MCNC: 0.4 MG/DL (ref 0.5–1.4)
ERYTHROCYTE [DISTWIDTH] IN BLOOD BY AUTOMATED COUNT: 14.6 % (ref 11.5–14.5)
EST. GFR  (NO RACE VARIABLE): >60 ML/MIN/1.73 M^2
GLUCOSE SERPL-MCNC: 80 MG/DL (ref 70–110)
HCT VFR BLD AUTO: 37.8 % (ref 37–48.5)
HGB BLD-MCNC: 12.2 G/DL (ref 12–16)
MAGNESIUM SERPL-MCNC: 1.8 MG/DL (ref 1.6–2.6)
MCH RBC QN AUTO: 28.5 PG (ref 27–31)
MCHC RBC AUTO-ENTMCNC: 32.3 G/DL (ref 32–36)
MCV RBC AUTO: 88 FL (ref 82–98)
PLATELET # BLD AUTO: 152 K/UL (ref 150–450)
PMV BLD AUTO: 10.4 FL (ref 9.2–12.9)
POTASSIUM SERPL-SCNC: 4.5 MMOL/L (ref 3.5–5.1)
RBC # BLD AUTO: 4.28 M/UL (ref 4–5.4)
SODIUM SERPL-SCNC: 141 MMOL/L (ref 136–145)
WBC # BLD AUTO: 7.52 K/UL (ref 3.9–12.7)

## 2023-07-20 PROCEDURE — 80048 BASIC METABOLIC PNL TOTAL CA: CPT | Performed by: STUDENT IN AN ORGANIZED HEALTH CARE EDUCATION/TRAINING PROGRAM

## 2023-07-20 PROCEDURE — 25000003 PHARM REV CODE 250: Performed by: STUDENT IN AN ORGANIZED HEALTH CARE EDUCATION/TRAINING PROGRAM

## 2023-07-20 PROCEDURE — 36415 COLL VENOUS BLD VENIPUNCTURE: CPT | Performed by: STUDENT IN AN ORGANIZED HEALTH CARE EDUCATION/TRAINING PROGRAM

## 2023-07-20 PROCEDURE — 21400001 HC TELEMETRY ROOM

## 2023-07-20 PROCEDURE — 83735 ASSAY OF MAGNESIUM: CPT | Performed by: STUDENT IN AN ORGANIZED HEALTH CARE EDUCATION/TRAINING PROGRAM

## 2023-07-20 PROCEDURE — 97535 SELF CARE MNGMENT TRAINING: CPT

## 2023-07-20 PROCEDURE — 97530 THERAPEUTIC ACTIVITIES: CPT

## 2023-07-20 PROCEDURE — 85027 COMPLETE CBC AUTOMATED: CPT | Performed by: STUDENT IN AN ORGANIZED HEALTH CARE EDUCATION/TRAINING PROGRAM

## 2023-07-20 PROCEDURE — 25000003 PHARM REV CODE 250: Performed by: INTERNAL MEDICINE

## 2023-07-20 PROCEDURE — 97116 GAIT TRAINING THERAPY: CPT

## 2023-07-20 RX ORDER — FUROSEMIDE 20 MG/1
20 TABLET ORAL DAILY PRN
Start: 2023-07-20

## 2023-07-20 RX ORDER — DIGOXIN 125 MCG
0.12 TABLET ORAL DAILY
Qty: 30 TABLET | Refills: 0 | Status: ON HOLD
Start: 2023-07-21 | End: 2023-11-22 | Stop reason: HOSPADM

## 2023-07-20 RX ADMIN — APIXABAN 2.5 MG: 2.5 TABLET, FILM COATED ORAL at 09:07

## 2023-07-20 RX ADMIN — MUPIROCIN 1 G: 20 OINTMENT TOPICAL at 09:07

## 2023-07-20 RX ADMIN — ACETAMINOPHEN 650 MG: 325 TABLET ORAL at 09:07

## 2023-07-20 RX ADMIN — LEVOTHYROXINE SODIUM 50 MCG: 0.03 TABLET ORAL at 06:07

## 2023-07-20 RX ADMIN — BUSPIRONE HYDROCHLORIDE 5 MG: 5 TABLET ORAL at 09:07

## 2023-07-20 RX ADMIN — SENNOSIDES AND DOCUSATE SODIUM 2 TABLET: 50; 8.6 TABLET ORAL at 09:07

## 2023-07-20 RX ADMIN — DIGOXIN 0.12 MG: 125 TABLET ORAL at 09:07

## 2023-07-20 NOTE — PROGRESS NOTES
[facility] Medicine  Progress Note    Patient Name: Jennifer Alarcon  MRN: [id number]  Patient Class: IP- Inpatient   Admission Date: 7/17/2023  Length of Stay: 3 days  Attending Physician: Francisco Jackson MD  Primary Care Provider: Tyrel Gonzales Jr, MD        Subjective:     Principal Problem:Atrial fibrillation with RVR        HPI:  Ms. Alarcon is a 89-years-old female who presented to the ED due to generalized weakness associated with palpitation.  Patient states she went to sleep at around 11:00 p.m., noted pounding sensation in her back, rapid, generally weak, possibly short of breath. Initially in the ED on arrival had nausea with episode of emesis.  Patient with known history of chronic atrial fibrillation, on metoprolol and Xarelto, seen by cardiologist Dr. Horan in the past.  Has chronic pedal edema.  Ambulates with walker baseline.  In the past she has declined permanent pacemaker placement.  Initially in the ED she was found to be in atrial fibrillation with RVR with heart rate 120-140, she was given 5 mg Lopressor with improvement in heart rate but then slowly declining heart rate with severe bradycardia with prolonged pauses, with same she became obtunded, as per ED provider about 3 minutes, external pacer was placed, she was given atropine, epinephrine and fluids.  ED provider discussed with on-call cardiologist, Dr. Thomas, recommended temporary pacemaker placement. Patient extremely anxious and as per ED provider requesting to speak to  as she believes she is about to die, reportedly was stating did not want external pacer as she is extremely sensitive to pain.  Code status addressed and confirmed DNR.   subsequently visited patient with family members present at bedside including two sons.  Case discussed with ED provider who requested admission.  Discussed with patient and family members present at bedside, currently she is in Afib with RVR with heart rate 130,  clarified she is DNR, at this time decision to keep pacer pads in place, if needed she may decide to remove at that time, she is concerned removal at this time will be suicide and wishes to discuss further with the . She is alert and oriented and remains anxious at bedside.       Overview/Hospital Course:  07/20  SNF wont accept her back because of tachycardia/Hypotension  No other issues  Not interested in pace maker      Interval History:     Review of Systems   Constitutional:  Negative for activity change and appetite change.   HENT:  Negative for congestion and dental problem.    Eyes:  Negative for discharge and itching.   Respiratory:  Negative for shortness of breath.    Cardiovascular:  Negative for chest pain.   Gastrointestinal:  Negative for abdominal distention and abdominal pain.   Endocrine: Negative for cold intolerance.   Genitourinary:  Negative for difficulty urinating and dysuria.   Musculoskeletal:  Negative for arthralgias and back pain.   Skin:  Negative for color change.   Neurological:  Negative for dizziness and facial asymmetry.   Hematological:  Negative for adenopathy.   Psychiatric/Behavioral:  Negative for agitation and behavioral problems.    Objective:     Vital Signs (Most Recent):  Temp: 97.9 °F (36.6 °C) (07/20/23 1645)  Pulse: 109 (07/20/23 1645)  Resp: 18 (07/20/23 1645)  BP: 132/61 (07/20/23 1645)  SpO2: 98 % (07/20/23 1645) Vital Signs (24h Range):  Temp:  [97.3 °F (36.3 °C)-97.9 °F (36.6 °C)] 97.9 °F (36.6 °C)  Pulse:  [] 109  Resp:  [16-20] 18  SpO2:  [95 %-98 %] 98 %  BP: ()/(56-88) 132/61     Weight: 48.5 kg (106 lb 14.8 oz)  Body mass index is 17.79 kg/m².    Intake/Output Summary (Last 24 hours) at 7/20/2023 1649  Last data filed at 7/20/2023 0405  Gross per 24 hour   Intake --   Output 750 ml   Net -750 ml         Physical Exam  Vitals and nursing note reviewed.   Constitutional:       General: She is not in acute distress.  HENT:      Head:  Atraumatic.      Comments: Extremely hard of hearing      Right Ear: External ear normal.      Left Ear: External ear normal.      Nose: Nose normal.      Mouth/Throat:      Mouth: Mucous membranes are moist.   Cardiovascular:      Rate and Rhythm: Tachycardia present. Rhythm irregular.   Pulmonary:      Effort: Pulmonary effort is normal.   Musculoskeletal:         General: Normal range of motion.      Cervical back: Normal range of motion and neck supple.   Skin:     General: Skin is warm.   Neurological:      Mental Status: She is alert and oriented to person, place, and time.   Psychiatric:         Behavior: Behavior normal.           Significant Labs: All pertinent labs within the past 24 hours have been reviewed.  CBC:   Recent Labs   Lab 07/19/23  0812 07/20/23  0328   WBC 7.77 7.52   HGB 12.9 12.2   HCT 40.9 37.8    152     CMP:   Recent Labs   Lab 07/19/23  0812 07/20/23  0328    141   K 3.5 4.5    105   CO2 29 32*   GLU 97 80   BUN 14 14   CREATININE 0.5 0.4*   CALCIUM 8.9 9.3   ANIONGAP 6* 4*       Significant Imaging: I have reviewed all pertinent imaging results/findings within the past 24 hours.      Assessment/Plan:      Sick sinus syndrome  Holding antiarrhythmics and AVN blocking agents for now  Digoxin for rate control   Pt Not interested in pacemaker !!    Discharge planning issues  SNF wont take her back because of High HR  SNF MD Asking for another  24 hrs in hospital   Cardiologist want to maintain present rx (Digoxin and eliquis)  If SNF wont take pt back will consider  LTAC/rehab placement  Pt should follow up with regular Cardiologist in [place]  Ideally should be under hospice care       History of colon cancer  Aware       Walker as ambulation aid  Aware       Current use of long term anticoagulation  Maintain low dose eliquis      Cardiomyopathy  Stable       Mitral regurgitation, moderate   Stable       Pedal edema  Resolved       DNR (do not resuscitate)  Maintain  this      Anxiety  Continue home regimen prn      Advanced age  Needs placement for rehab        VTE Risk Mitigation (From admission, onward)         Ordered     apixaban tablet 2.5 mg  2 times daily         07/20/23 0902     IP VTE HIGH RISK PATIENT  Once         07/17/23 0349     Place sequential compression device  Until discontinued         07/17/23 0349                Discharge Planning   JASMYNE: 7/20/2023     Code Status: DNR   Is the patient medically ready for discharge?:     Reason for patient still in hospital (select all that apply): Pending disposition  Discharge Plan A: Skilled Nursing Facility   Discharge Delays: (!) Post-Acute Set-up              Francisco Jackson MD  Department of Hospital Medicine   [facility]

## 2023-07-20 NOTE — HOSPITAL COURSE
Pt admitted with possible SSS but pt was not interested in PPM placement  [employer] were DC ed and pt was started on digoxin  Xarelto was replaced with eliquis  Later pt was discharged to SNF  Prognosis is very poor

## 2023-07-20 NOTE — ASSESSMENT & PLAN NOTE
Holding antiarrhythmics and AVN blocking agents for now  Digoxin for rate control   Pt Not interested in pacemaker !!

## 2023-07-20 NOTE — PLAN OF CARE
Problem: Occupational Therapy  Goal: Occupational Therapy Goal  Description: Goals to be met by: 8/18/2023     Patient will increase functional independence with ADLs by performing:    LE Dressing with Modified [place].  Grooming while standing at sink with Modified [place].  Toileting from toilet with Modified [place] for hygiene and clothing management.   Step transfer with Modified [place]  Toilet transfer to toilet with Modified [place].  Upper extremity exercise program x10 reps per handout, with assistance as needed.    Outcome: Ongoing, Progressing

## 2023-07-20 NOTE — PLAN OF CARE
Problem: Adult Inpatient Plan of Care  Goal: Plan of Care Review  Outcome: Ongoing, Progressing  Goal: Patient-Specific Goal (Individualized)  Outcome: Ongoing, Progressing  Goal: Absence of Hospital-Acquired Illness or Injury  Outcome: Ongoing, Progressing  Goal: Optimal Comfort and Wellbeing  Outcome: Ongoing, Progressing  Goal: Readiness for Transition of Care  Outcome: Ongoing, Progressing     Problem: Diabetes Comorbidity  Goal: Blood Glucose Level Within Targeted Range  Outcome: Ongoing, Progressing     Problem: Fall Injury Risk  Goal: Absence of Fall and Fall-Related Injury  Outcome: Ongoing, Progressing     Problem: Skin Injury Risk Increased  Goal: Skin Health and Integrity  Outcome: Ongoing, Progressing     Problem: Dysrhythmia  Goal: Normalized Cardiac Rhythm  Outcome: Ongoing, Progressing     Problem: Oral Intake Inadequate  Goal: Improved Oral Intake  Outcome: Ongoing, Progressing

## 2023-07-20 NOTE — ASSESSMENT & PLAN NOTE
SNF wont take her back because of High HR  SNF MD Asking for another  24 hrs in hospital   Cardiologist want to maintain present rx (Digoxin and eliquis)  If SNF wont take pt back will consider  LTAC/rehab placement  Pt should follow up with regular Cardiologist in [place]  Ideally should be under hospice care

## 2023-07-20 NOTE — SUBJECTIVE & OBJECTIVE
Interval History:     Review of Systems   Constitutional:  Negative for activity change and appetite change.   HENT:  Negative for congestion and dental problem.    Eyes:  Negative for discharge and itching.   Respiratory:  Negative for shortness of breath.    Cardiovascular:  Negative for chest pain.   Gastrointestinal:  Negative for abdominal distention and abdominal pain.   Endocrine: Negative for cold intolerance.   Genitourinary:  Negative for difficulty urinating and dysuria.   Musculoskeletal:  Negative for arthralgias and back pain.   Skin:  Negative for color change.   Neurological:  Negative for dizziness and facial asymmetry.   Hematological:  Negative for adenopathy.   Psychiatric/Behavioral:  Negative for agitation and behavioral problems.    Objective:     Vital Signs (Most Recent):  Temp: 97.9 °F (36.6 °C) (07/20/23 1645)  Pulse: 109 (07/20/23 1645)  Resp: 18 (07/20/23 1645)  BP: 132/61 (07/20/23 1645)  SpO2: 98 % (07/20/23 1645) Vital Signs (24h Range):  Temp:  [97.3 °F (36.3 °C)-97.9 °F (36.6 °C)] 97.9 °F (36.6 °C)  Pulse:  [] 109  Resp:  [16-20] 18  SpO2:  [95 %-98 %] 98 %  BP: ()/(56-88) 132/61     Weight: 48.5 kg (106 lb 14.8 oz)  Body mass index is 17.79 kg/m².    Intake/Output Summary (Last 24 hours) at 7/20/2023 1649  Last data filed at 7/20/2023 0405  Gross per 24 hour   Intake --   Output 750 ml   Net -750 ml         Physical Exam  Vitals and nursing note reviewed.   Constitutional:       General: She is not in acute distress.  HENT:      Head: Atraumatic.      Comments: Extremely hard of hearing      Right Ear: External ear normal.      Left Ear: External ear normal.      Nose: Nose normal.      Mouth/Throat:      Mouth: Mucous membranes are moist.   Cardiovascular:      Rate and Rhythm: Tachycardia present. Rhythm irregular.   Pulmonary:      Effort: Pulmonary effort is normal.   Musculoskeletal:         General: Normal range of motion.      Cervical back: Normal range of  motion and neck supple.   Skin:     General: Skin is warm.   Neurological:      Mental Status: She is alert and oriented to person, place, and time.   Psychiatric:         Behavior: Behavior normal.           Significant Labs: All pertinent labs within the past 24 hours have been reviewed.  CBC:   Recent Labs   Lab 07/19/23  0812 07/20/23  0328   WBC 7.77 7.52   HGB 12.9 12.2   HCT 40.9 37.8    152     CMP:   Recent Labs   Lab 07/19/23  0812 07/20/23 0328    141   K 3.5 4.5    105   CO2 29 32*   GLU 97 80   BUN 14 14   CREATININE 0.5 0.4*   CALCIUM 8.9 9.3   ANIONGAP 6* 4*       Significant Imaging: I have reviewed all pertinent imaging results/findings within the past 24 hours.

## 2023-07-20 NOTE — PT/OT/SLP PROGRESS
Physical Therapy Treatment    Patient Name:  Jennifer Alarcon   MRN:  [id number]    Recommendations:     Discharge Recommendations: nursing facility, skilled  Discharge Equipment Recommendations: none  Barriers to discharge: Decreased caregiver support    Assessment:     Jennifer Alarcon is a 89 y.o. female admitted with a medical diagnosis of Atrial fibrillation with RVR.  She presents with the following impairments/functional limitations: weakness, impaired endurance, impaired functional mobility, gait instability, impaired balance, decreased lower extremity function, decreased safety awareness, impaired cardiopulmonary response to activity. Patient is agreeable to participation with PT treatment. She requires SBA for supine to sit and CGA for sit to stand with RW. She ambulated 60' in room with RW and CGA noting feeling winded and legs feeling a little weak. She returned to sitting EOB with SpO2 of 96% on RA and HR 110s-120s. Patient returned to bed with bed alarm on and sister present.     Rehab Prognosis: Good; patient would benefit from acute skilled PT services to address these deficits and reach maximum level of function.    Recent Surgery: * No surgery found *      Plan:     During this hospitalization, patient to be seen 6 x/week to address the identified rehab impairments via gait training, therapeutic activities, therapeutic exercises and progress toward the following goals:    Plan of Care Expires:  08/18/23    Subjective     Chief Complaint: Leg weakness with ambulation   Patient/Family Comments/goals: SNF  Pain/Comfort:  Pain Rating 1: 0/10      Objective:     Communicated with TAWANNA Foy prior to session.  Patient found HOB elevated with bed alarm, PureWick, telemetry upon PT entry to room.     General Precautions: Standard, fall  Orthopedic Precautions: N/A  Braces: N/A  Respiratory Status: Room air     Functional Mobility:  Bed Mobility:     Supine to Sit: stand by assistance  Transfers:     Sit to  Stand:  contact guard assistance with rolling walker  Gait: 60' in room with RW and CGA      AM-PAC 6 CLICK MOBILITY          Treatment & Education:  Patient was educated on the importance of OOB activity and functional mobility to negate negative effects of prolonged bed rest during hospitalization, safe transfers and ambulation, and D/C planning     Patient left HOB elevated with all lines intact, call button in reach, bed alarm on, and sister present..    GOALS:   Multidisciplinary Problems       Physical Therapy Goals          Problem: Physical Therapy    Goal Priority Disciplines Outcome Goal Variances Interventions   Physical Therapy Goal     PT, PT/OT Ongoing, Progressing     Description: Goals to be met by: 23    Patient will increase functional independence with mobility by performin. Supine to sit with Supervision  2. Sit to stand transfer with Supervision  3. Bed to chair transfer with Supervision using Rolling Walker  4. Gait  x 250 feet with Supervision using Rolling Walker.   5. Lower extremity exercise program x20 reps per handout, with supervision                       Time Tracking:     PT Received On: 23  PT Start Time: 1117     PT Stop Time: 1135  PT Total Time (min): 18 min     Billable Minutes: Gait Training 18    Treatment Type: Treatment  PT/PTA: PT     Number of PTA visits since last PT visit: 0     2023

## 2023-07-20 NOTE — PT/OT/SLP PROGRESS
Occupational Therapy   Treatment    Name: Jennifer Alarcon  MRN: [id number]  Admitting Diagnosis:  Atrial fibrillation with RVR       Recommendations:     Discharge Recommendations: nursing facility, skilled  Discharge Equipment Recommendations:  none  Barriers to discharge:  Decreased caregiver support    Assessment:     Jennifer Alarcon is a 89 y.o. female with a medical diagnosis of Atrial fibrillation with RVR.  She presents with improving medical acuity and functional mobility. Patient participated in bed mobility, unsupported sitting EOB, LB dressing sitting EOB, bed/chair transfer and grooming sitting in chair. Performance deficits affecting function are weakness, impaired endurance, impaired self care skills, impaired functional mobility, gait instability, impaired balance, decreased safety awareness.     Rehab Prognosis:  Fair; patient would benefit from acute skilled OT services to address these deficits and reach maximum level of function.       Plan:     Patient to be seen 5 x/week to address the above listed problems via self-care/home management, therapeutic activities, therapeutic exercises  Plan of Care Expires: 08/18/23  Plan of Care Reviewed with: patient    Subjective     Chief Complaint: General weakness  Patient/Family Comments/goals: improved functional mobility, ADL independence and safety awareness  Pain/Comfort:  Pain Rating 1: 0/10  Pain Rating Post-Intervention 1: 0/10    Objective:     Communicated with: nurse prior to session.  Patient found HOB elevated with telemetry, peripheral IV, PureWick upon OT entry to room.    General Precautions: Standard, fall    Orthopedic Precautions:N/A  Braces: N/A  Respiratory Status: Room air     Occupational Performance:     Bed Mobility:    Patient completed Scooting/Bridging with contact guard assistance  Patient completed Supine to Sit with contact guard assistance   Performed unsupported sitting EOB with contact guard assistance.    Functional  Mobility/Transfers:  Patient completed Bed <> Chair Transfer using Step Transfer technique with contact guard assistance with rolling walker    Activities of Daily Living:  Grooming: contact guard assistance to wash face sitting in chair.  Lower Body Dressing: contact guard assistance to don/doff socks sitting EOB.      [facility] 6 Click ADL: 19    Treatment & Education:  Patient continues to make positive progress towards goals.    Patient left up in chair with all lines intact and call button in reach    GOALS:   Multidisciplinary Problems       Occupational Therapy Goals          Problem: Occupational Therapy    Goal Priority Disciplines Outcome Interventions   Occupational Therapy Goal     OT, PT/OT Ongoing, Progressing    Description: Goals to be met by: 8/18/2023     Patient will increase functional independence with ADLs by performing:    LE Dressing with Modified [place].  Grooming while standing at sink with Modified [place].  Toileting from toilet with Modified [place] for hygiene and clothing management.   Step transfer with Modified [place]  Toilet transfer to toilet with Modified [place].  Upper extremity exercise program x10 reps per handout, with assistance as needed.                         Time Tracking:     OT Date of Treatment: 07/20/23  OT Start Time: 0919  OT Stop Time: 0949  OT Total Time (min): 30 min    Billable Minutes:Self Care/Home Management 15  Therapeutic Activity 15    OT/JONY: OT          7/20/2023

## 2023-07-20 NOTE — PLAN OF CARE
CM sent dc orders, AVS, 142, PASARR,CXR and PPD to [facility] via [employer]. CM messaged Annie at [facility] to notify required documents have been uploaded.    1500- Received notice from Annie at [facility] that Dr Jacobs is not willing to accept pt due to tachycardia. CM explained that Dr Jackson has reviewed this with Dr Thomas, pt and family. Pt is medically clear for discharge today.    CM notified Dr Jackson and pt's daughter Sahra. CM sent additional SNF referrals to [facility], [facility] and [facility].     Pt accepted at [facility] per yoav Fenton.      1608- CM s/w pt and daughter Sahra about discharge plan. Annie at [facility] states Dr Jacobs wants pt to be observed over night and then re evaluate acceptance to [facility] SNF tomorrow. CM called Dr Jackson to notify. Dr Jackson will rescind the discharge and reevaluate tomorrow.

## 2023-07-21 VITALS
WEIGHT: 106.25 LBS | BODY MASS INDEX: 17.7 KG/M2 | RESPIRATION RATE: 20 BRPM | SYSTOLIC BLOOD PRESSURE: 115 MMHG | HEART RATE: 100 BPM | DIASTOLIC BLOOD PRESSURE: 66 MMHG | OXYGEN SATURATION: 96 % | HEIGHT: 65 IN | TEMPERATURE: 98 F

## 2023-07-21 LAB
ANION GAP SERPL CALC-SCNC: 7 MMOL/L (ref 8–16)
BUN SERPL-MCNC: 12 MG/DL (ref 8–23)
CALCIUM SERPL-MCNC: 9.3 MG/DL (ref 8.7–10.5)
CHLORIDE SERPL-SCNC: 103 MMOL/L (ref 95–110)
CO2 SERPL-SCNC: 30 MMOL/L (ref 23–29)
CREAT SERPL-MCNC: 0.3 MG/DL (ref 0.5–1.4)
DIGOXIN SERPL-MCNC: 1.2 NG/ML (ref 0.8–2)
ERYTHROCYTE [DISTWIDTH] IN BLOOD BY AUTOMATED COUNT: 14.6 % (ref 11.5–14.5)
EST. GFR  (NO RACE VARIABLE): >60 ML/MIN/1.73 M^2
GLUCOSE SERPL-MCNC: 79 MG/DL (ref 70–110)
HCT VFR BLD AUTO: 39.7 % (ref 37–48.5)
HGB BLD-MCNC: 12.6 G/DL (ref 12–16)
MAGNESIUM SERPL-MCNC: 2.1 MG/DL (ref 1.6–2.6)
MCH RBC QN AUTO: 28.7 PG (ref 27–31)
MCHC RBC AUTO-ENTMCNC: 31.7 G/DL (ref 32–36)
MCV RBC AUTO: 90 FL (ref 82–98)
PLATELET # BLD AUTO: 160 K/UL (ref 150–450)
PMV BLD AUTO: 10.7 FL (ref 9.2–12.9)
POTASSIUM SERPL-SCNC: 4.5 MMOL/L (ref 3.5–5.1)
RBC # BLD AUTO: 4.39 M/UL (ref 4–5.4)
SODIUM SERPL-SCNC: 140 MMOL/L (ref 136–145)
WBC # BLD AUTO: 8.95 K/UL (ref 3.9–12.7)

## 2023-07-21 PROCEDURE — 83735 ASSAY OF MAGNESIUM: CPT | Performed by: STUDENT IN AN ORGANIZED HEALTH CARE EDUCATION/TRAINING PROGRAM

## 2023-07-21 PROCEDURE — 80162 ASSAY OF DIGOXIN TOTAL: CPT | Performed by: INTERNAL MEDICINE

## 2023-07-21 PROCEDURE — 36415 COLL VENOUS BLD VENIPUNCTURE: CPT | Performed by: STUDENT IN AN ORGANIZED HEALTH CARE EDUCATION/TRAINING PROGRAM

## 2023-07-21 PROCEDURE — 25000003 PHARM REV CODE 250: Performed by: STUDENT IN AN ORGANIZED HEALTH CARE EDUCATION/TRAINING PROGRAM

## 2023-07-21 PROCEDURE — 25000003 PHARM REV CODE 250: Performed by: INTERNAL MEDICINE

## 2023-07-21 PROCEDURE — 36415 COLL VENOUS BLD VENIPUNCTURE: CPT | Performed by: INTERNAL MEDICINE

## 2023-07-21 PROCEDURE — 97535 SELF CARE MNGMENT TRAINING: CPT

## 2023-07-21 PROCEDURE — 80048 BASIC METABOLIC PNL TOTAL CA: CPT | Performed by: STUDENT IN AN ORGANIZED HEALTH CARE EDUCATION/TRAINING PROGRAM

## 2023-07-21 PROCEDURE — 85027 COMPLETE CBC AUTOMATED: CPT | Performed by: STUDENT IN AN ORGANIZED HEALTH CARE EDUCATION/TRAINING PROGRAM

## 2023-07-21 RX ADMIN — APIXABAN 2.5 MG: 2.5 TABLET, FILM COATED ORAL at 08:07

## 2023-07-21 RX ADMIN — DIGOXIN 0.12 MG: 125 TABLET ORAL at 08:07

## 2023-07-21 NOTE — PLAN OF CARE
CM sent updated VS to Annie at [facility] via [employer].CM messaged Annei to inform [facility].    CM received a call from Sis at [place]. Pt is accepted at [facility].    Pt has SNF acceptance at [facility], [facility] and [facility]. First choice is [facility] but medical director will not accept until reviewing updated VS.    1105- Per Annie Jacobs reviewing pts vitals.    1257- Per Annie orders are being reviewed. CM reminded Annie the orders were approved yesterday 7/20/23 and it is the same discharge orders.

## 2023-07-21 NOTE — PT/OT/SLP PROGRESS
Physical Therapy      Patient Name:  Jennifer Alarcon   MRN:  [id number]    Patient not seen today secondary to pending D/C to SNF with RN in the process of calling report.

## 2023-07-21 NOTE — PLAN OF CARE
07/20/23 1645   Vital Signs   Temp 97.9 °F (36.6 °C)   Temp Source Oral   Pulse 109   Heart Rate Source Monitor   Resp 18   SpO2 98 %   Pulse Oximetry Type Intermittent   Device (Oxygen Therapy) room air   /61   MAP (mmHg) 88   BP Location Left arm   BP Method Automatic   Patient Position Sitting   Orthostatic VS No

## 2023-07-21 NOTE — PLAN OF CARE
Patient cleared for discharge from case management standpoint.    DC orders sent to [facility] and approved by [facility] nursing staff and Dr Jacobs. Nurse Sherri instructed to calling report. [facility] will provide transportation but could not give an eta.    Chart and discharge orders reviewed.  Patient discharged to [facility] with no further case management needs.       07/21/23 1317   Final Note   Assessment Type Final Discharge Note   Anticipated Discharge Disposition SNF   Hospital Resources/Appts/Education Provided Provided patient/caregiver with written discharge plan information;Appointments scheduled and added to AVS   Post-Acute Status   Post-Acute Authorization Placement   Post-Acute Placement Status Set-up Complete/Auth obtained   Discharge Delays (!) Ambulance Transport/Facility Transport

## 2023-07-21 NOTE — PLAN OF CARE
07/21/23 0311   Vital Signs   Temp 97.8 °F (36.6 °C)   Temp Source Oral   Pulse 95   Heart Rate Source Monitor   Resp 18   SpO2 97 %   Device (Oxygen Therapy) room air   /66   MAP (mmHg) 83   BP Location Right leg   BP Method Automatic   Patient Position Lying   Height and Weight   Weight 48.2 kg (106 lb 4.2 oz)   Weight Method Bed Scale   BMI (Calculated) 17.7

## 2023-07-21 NOTE — PLAN OF CARE
07/20/23 2328   Vital Signs   Temp 97.6 °F (36.4 °C)   Temp Source Oral   Pulse 99   Heart Rate Source Monitor   Resp 18   SpO2 97 %   Device (Oxygen Therapy) room air   /61   MAP (mmHg) 88   BP Location Left arm   BP Method Automatic   Patient Position Lying

## 2023-07-21 NOTE — PT/OT/SLP PROGRESS
Occupational Therapy   Treatment    Name: Jennifer Alarcon  MRN: [id number]  Admitting Diagnosis:  Atrial fibrillation with RVR       Recommendations:     Discharge Recommendations: nursing facility, skilled  Discharge Equipment Recommendations:  none  Barriers to discharge:  Decreased caregiver support    Assessment:     Jennifer Alarcon is a 89 y.o. female with a medical diagnosis of Atrial fibrillation with RVR.  She presents with improving medical acuity and functional mobility. Patient participated in bed mobility, LB dressing sitting EOB and bed/chair transfer. Performance deficits affecting function are weakness, impaired endurance, impaired sensation, impaired self care skills, impaired functional mobility.     Rehab Prognosis:  Fair; patient would benefit from acute skilled OT services to address these deficits and reach maximum level of function.       Plan:     Patient to be seen 5 x/week to address the above listed problems via self-care/home management, therapeutic activities, therapeutic exercises  Plan of Care Expires: 08/18/23  Plan of Care Reviewed with: patient    Subjective     Chief Complaint: General weakness  Patient/Family Comments/goals: improved functional mobility and ADL independence.  Pain/Comfort:  Pain Rating 1: 0/10  Pain Rating Post-Intervention 1: 0/10    Objective:     Communicated with: nurse prior to session.  Patient found HOB elevated with telemetry, peripheral IV, PureWick upon OT entry to room.    General Precautions: Standard, fall    Orthopedic Precautions:N/A  Braces: N/A  Respiratory Status: Room air     Occupational Performance:     Bed Mobility:    Patient completed Scooting/Bridging with contact guard assistance  Patient completed Supine to Sit with contact guard assistance     Functional Mobility/Transfers:  Patient completed Bed <> Chair Transfer using Step Transfer technique with contact guard assistance with rolling walker    Activities of Daily Living:  Lower Body  Dressing: contact guard assistance to don socks sitting EOB.      [facility] 6 Click ADL:      Treatment & Education:  Patient is making positive progress towards OT goals. Patient instructed to sit up in chair for at least 2 hours.    Patient left up in chair with all lines intact and call button in reach    GOALS:   Multidisciplinary Problems       Occupational Therapy Goals          Problem: Occupational Therapy    Goal Priority Disciplines Outcome Interventions   Occupational Therapy Goal     OT, PT/OT Ongoing, Progressing    Description: Goals to be met by: 8/18/2023     Patient will increase functional independence with ADLs by performing:    LE Dressing with Modified [place].  Grooming while standing at sink with Modified [place].  Toileting from toilet with Modified [place] for hygiene and clothing management.   Step transfer with Modified [place]  Toilet transfer to toilet with Modified [place].  Upper extremity exercise program x10 reps per handout, with assistance as needed.                         Time Tracking:     OT Date of Treatment: 07/21/23  OT Start Time: 0935  OT Stop Time: 0947  OT Total Time (min): 12 min    Billable Minutes:Self Care/Home Management 12    OT/JONY: OT          7/21/2023

## 2023-07-21 NOTE — PLAN OF CARE
07/20/23 1933   Vital Signs   Temp 97.9 °F (36.6 °C)   Temp Source Oral   Pulse 75   Heart Rate Source Monitor   Resp 18   SpO2 96 %   Device (Oxygen Therapy) room air   /77   MAP (mmHg) 101   BP Location Left arm   BP Method Automatic   Patient Position Lying

## 2023-07-21 NOTE — PLAN OF CARE
07/21/23 0824   Vital Signs   Temp 97.2 °F (36.2 °C)   Temp Source Oral   Pulse 106   Resp 20   SpO2 (!) 94 %   Device (Oxygen Therapy) room air   BP (!) 101/55   MAP (mmHg) 75   BP Location Right arm   BP Method Automatic   Patient Position Sitting

## 2023-07-23 NOTE — DISCHARGE SUMMARY
[facility] Medicine  Discharge Summary      Patient Name: Jennifer Alarcon  MRN: [id number]  DRE: [id number]  Patient Class: IP- Inpatient  Admission Date: 7/17/2023  Hospital Length of Stay: 4 days  Discharge Date and Time: 7/21/2023  6:28 PM  Attending Physician: No att. providers found   Discharging Provider: Francisco Jackson MD  Primary Care Provider: Tyrel Gonzales Jr, MD    Primary Care Team: Networked reference to record PCT     HPI:   Ms. Alarcon is a 89-years-old female who presented to the ED due to generalized weakness associated with palpitation.  Patient states she went to sleep at around 11:00 p.m., noted pounding sensation in her back, rapid, generally weak, possibly short of breath. Initially in the ED on arrival had nausea with episode of emesis.  Patient with known history of chronic atrial fibrillation, on metoprolol and Xarelto, seen by cardiologist Dr. Horan in the past.  Has chronic pedal edema.  Ambulates with walker baseline.  In the past she has declined permanent pacemaker placement.  Initially in the ED she was found to be in atrial fibrillation with RVR with heart rate 120-140, she was given 5 mg Lopressor with improvement in heart rate but then slowly declining heart rate with severe bradycardia with prolonged pauses, with same she became obtunded, as per ED provider about 3 minutes, external pacer was placed, she was given atropine, epinephrine and fluids.  ED provider discussed with on-call cardiologist, Dr. Thomas, recommended temporary pacemaker placement. Patient extremely anxious and as per ED provider requesting to speak to  as she believes she is about to die, reportedly was stating did not want external pacer as she is extremely sensitive to pain.  Code status addressed and confirmed DNR.   subsequently visited patient with family members present at bedside including two sons.  Case discussed with ED provider who requested admission.  Discussed  with patient and family members present at bedside, currently she is in Afib with RVR with heart rate 130, clarified she is DNR, at this time decision to keep pacer pads in place, if needed she may decide to remove at that time, she is concerned removal at this time will be suicide and wishes to discuss further with the . She is alert and oriented and remains anxious at bedside.       * No surgery found *      Hospital Course:   Pt admitted with possible SSS but pt was not interested in PPM placement  [employer] were DC ed and pt was started on digoxin  Xarelto was replaced with eliquis  Later pt was discharged to SNF  Prognosis is very poor        Goals of Care Treatment Preferences:  Code Status: DNR      Consults:   Consults (From admission, onward)        Status Ordering Provider     Inpatient consult to Cardiology  Once        Provider:  El Thomas MD    Completed JING GIL          No new Assessment & Plan notes have been filed under this hospital service since the last note was generated.  Service: Hospital Medicine    Final Active Diagnoses:    Diagnosis Date Noted POA    Sick sinus syndrome [I49.5] 07/17/2023 Yes    Discharge planning issues [Z02.9] 07/20/2023 Not Applicable    Advanced age [R54] 07/17/2023 Yes     Chronic    Anxiety [F41.9] 07/17/2023 Yes     Chronic    DNR (do not resuscitate) [Z66] 07/17/2023 Yes     Chronic    Pedal edema [R60.0] 07/17/2023 Yes     Chronic    Mitral regurgitation, moderate  [I34.0] 07/17/2023 Yes     Chronic    Cardiomyopathy [I42.9] 07/17/2023 Yes     Chronic    Current use of long term anticoagulation [Z79.01] 07/17/2023 Not Applicable     Chronic    Walker as ambulation aid [Z99.89] 07/17/2023 Not Applicable     Chronic    History of colon cancer [Z85.038] 07/17/2023 Yes     Chronic      Problems Resolved During this Admission:    Diagnosis Date Noted Date Resolved POA    PRINCIPAL PROBLEM:  Atrial fibrillation with RVR [I48.91]  07/17/2023 07/20/2023 Yes       Discharged Condition: good    Disposition: Skilled Nursing Facility    Follow Up:    Patient Instructions:      Ambulatory referral/consult to Outpatient Case Management   Referral Priority: Routine Referral Type: Consultation   Referral Reason: Specialty Services Required   Number of Visits Requested: 1           Pending Diagnostic Studies:     None         Medications:  Reconciled Home Medications:      Medication List      START taking these medications    apixaban 2.5 mg Tab  Commonly known as: ELIQUIS  Take 1 tablet (2.5 mg total) by mouth 2 (two) times daily.     digoxin 125 mcg tablet  Commonly known as: LANOXIN  Take 1 tablet (0.125 mg total) by mouth once daily.        CHANGE how you take these medications    busPIRone 5 MG Tab  Commonly known as: BUSPAR  Take 1 tablet (5 mg total) by mouth 2 (two) times daily as needed (anxiety).  What changed: additional instructions     furosemide 20 MG tablet  Commonly known as: LASIX  Take 1 tablet (20 mg total) by mouth daily as needed (swelling/edema).  What changed:   · when to take this  · reasons to take this        CONTINUE taking these medications    bisacodyL 5 mg EC tablet  Commonly known as: DULCOLAX  Take 5 mg by mouth daily as needed for Constipation.     fluocinolone acetonide oiL 0.01 % Drop  Place 5 drops in ear(s) 2 (two) times daily as needed.        STOP taking these medications    metoprolol succinate 50 MG 24 hr tablet  Commonly known as: TOPROL-XL     rivaroxaban 15 mg Tab  Commonly known as: XARELTO            Indwelling Lines/Drains at time of discharge:   Lines/Drains/Airways     None               Physical Exam  Cardiovascular:      Rate and Rhythm: Normal rate.   Neurological:      Mental Status: She is alert and oriented to person, place, and time.       Time spent on the discharge of patient: 45 minutes         Francisco Jackson MD  Department of Hospital Medicine  [facility]

## 2023-07-24 ENCOUNTER — OUTPATIENT CASE MANAGEMENT (OUTPATIENT)
Dept: ADMINISTRATIVE | Facility: OTHER | Age: 88
End: 2023-07-24
Payer: MEDICARE

## 2023-07-24 NOTE — PROGRESS NOTES
07/24/23-Discharged to [facility] on 07/21/23. OPCM Case Closure.

## 2023-11-17 ENCOUNTER — CLINICAL SUPPORT (OUTPATIENT)
Dept: CARDIOLOGY | Facility: HOSPITAL | Age: 88
DRG: 309 | End: 2023-11-17
Attending: INTERNAL MEDICINE
Payer: MEDICARE

## 2023-11-17 ENCOUNTER — HOSPITAL ENCOUNTER (INPATIENT)
Facility: HOSPITAL | Age: 88
LOS: 5 days | DRG: 309 | End: 2023-11-22
Attending: EMERGENCY MEDICINE | Admitting: INTERNAL MEDICINE
Payer: MEDICARE

## 2023-11-17 VITALS — BODY MASS INDEX: 17.12 KG/M2 | HEIGHT: 65 IN | WEIGHT: 102.75 LBS

## 2023-11-17 DIAGNOSIS — Z71.89 GOALS OF CARE, COUNSELING/DISCUSSION: ICD-10-CM

## 2023-11-17 DIAGNOSIS — R00.1 SYMPTOMATIC BRADYCARDIA: ICD-10-CM

## 2023-11-17 DIAGNOSIS — R53.1 WEAKNESS: ICD-10-CM

## 2023-11-17 DIAGNOSIS — R79.89 ABNORMAL TSH: ICD-10-CM

## 2023-11-17 DIAGNOSIS — R07.9 CHEST PAIN: ICD-10-CM

## 2023-11-17 DIAGNOSIS — R45.89 ANXIETY ABOUT HEALTH: ICD-10-CM

## 2023-11-17 DIAGNOSIS — R00.1 BRADYCARDIA: Primary | ICD-10-CM

## 2023-11-17 PROBLEM — I50.42 CHRONIC COMBINED SYSTOLIC AND DIASTOLIC HEART FAILURE: Status: ACTIVE | Noted: 2023-11-17

## 2023-11-17 LAB
ALBUMIN SERPL BCP-MCNC: 3.6 G/DL (ref 3.5–5.2)
ALBUMIN SERPL BCP-MCNC: 3.7 G/DL (ref 3.5–5.2)
ALP SERPL-CCNC: 44 U/L (ref 55–135)
ALP SERPL-CCNC: 46 U/L (ref 55–135)
ALT SERPL W/O P-5'-P-CCNC: 9 U/L (ref 10–44)
ALT SERPL W/O P-5'-P-CCNC: 9 U/L (ref 10–44)
ANION GAP SERPL CALC-SCNC: 7 MMOL/L (ref 8–16)
ANION GAP SERPL CALC-SCNC: 8 MMOL/L (ref 8–16)
AORTIC ROOT ANNULUS: 2.9 CM
AORTIC VALVE CUSP SEPERATION: 1.5 CM
ASCENDING AORTA: 2.7 CM
AST SERPL-CCNC: 14 U/L (ref 10–40)
AST SERPL-CCNC: 14 U/L (ref 10–40)
AV INDEX (PROSTH): 0.71
AV MEAN GRADIENT: 4 MMHG
AV PEAK GRADIENT: 7 MMHG
AV VALVE AREA BY VELOCITY RATIO: 1.84 CM²
AV VALVE AREA: 1.8 CM²
AV VELOCITY RATIO: 0.72
BASOPHILS # BLD AUTO: 0.05 K/UL (ref 0–0.2)
BASOPHILS NFR BLD: 0.7 % (ref 0–1.9)
BILIRUB SERPL-MCNC: 0.8 MG/DL (ref 0.1–1)
BILIRUB SERPL-MCNC: 0.9 MG/DL (ref 0.1–1)
BNP SERPL-MCNC: 287 PG/ML (ref 0–99)
BSA FOR ECHO PROCEDURE: 1.46 M2
BUN SERPL-MCNC: 21 MG/DL (ref 10–30)
BUN SERPL-MCNC: 24 MG/DL (ref 10–30)
CALCIUM SERPL-MCNC: 9.4 MG/DL (ref 8.7–10.5)
CALCIUM SERPL-MCNC: 9.5 MG/DL (ref 8.7–10.5)
CHLORIDE SERPL-SCNC: 102 MMOL/L (ref 95–110)
CHLORIDE SERPL-SCNC: 105 MMOL/L (ref 95–110)
CO2 SERPL-SCNC: 28 MMOL/L (ref 23–29)
CO2 SERPL-SCNC: 30 MMOL/L (ref 23–29)
CREAT SERPL-MCNC: 0.5 MG/DL (ref 0.5–1.4)
CREAT SERPL-MCNC: 0.7 MG/DL (ref 0.5–1.4)
CV ECHO LV RWT: 0.41 CM
DIFFERENTIAL METHOD: NORMAL
DIGOXIN SERPL-MCNC: <0.3 NG/ML (ref 0.8–2)
DOP CALC AO PEAK VEL: 1.3 M/S
DOP CALC AO VTI: 37.1 CM
DOP CALC LVOT AREA: 2.5 CM2
DOP CALC LVOT DIAMETER: 1.8 CM
DOP CALC LVOT PEAK VEL: 0.94 M/S
DOP CALC LVOT STROKE VOLUME: 66.64 CM3
DOP CALC MV VTI: 30.9 CM
DOP CALCLVOT PEAK VEL VTI: 26.2 CM
E WAVE DECELERATION TIME: 243 MSEC
E/A RATIO: 2.74
ECHO LV POSTERIOR WALL: 0.89 CM (ref 0.6–1.1)
EOSINOPHIL # BLD AUTO: 0.3 K/UL (ref 0–0.5)
EOSINOPHIL NFR BLD: 3.7 % (ref 0–8)
ERYTHROCYTE [DISTWIDTH] IN BLOOD BY AUTOMATED COUNT: 14.1 % (ref 11.5–14.5)
ERYTHROCYTE [DISTWIDTH] IN BLOOD BY AUTOMATED COUNT: 14.2 % (ref 11.5–14.5)
EST. GFR  (NO RACE VARIABLE): >60 ML/MIN/1.73 M^2
EST. GFR  (NO RACE VARIABLE): >60 ML/MIN/1.73 M^2
FRACTIONAL SHORTENING: 31 % (ref 28–44)
GLUCOSE SERPL-MCNC: 87 MG/DL (ref 70–110)
GLUCOSE SERPL-MCNC: 96 MG/DL (ref 70–110)
HCT VFR BLD AUTO: 36 % (ref 37–48.5)
HCT VFR BLD AUTO: 37.5 % (ref 37–48.5)
HGB BLD-MCNC: 11.7 G/DL (ref 12–16)
HGB BLD-MCNC: 12.1 G/DL (ref 12–16)
IMM GRANULOCYTES # BLD AUTO: 0.01 K/UL (ref 0–0.04)
IMM GRANULOCYTES NFR BLD AUTO: 0.1 % (ref 0–0.5)
INR PPP: 1.1 (ref 0.8–1.2)
INTERVENTRICULAR SEPTUM: 0.75 CM (ref 0.6–1.1)
LEFT ATRIUM SIZE: 3.9 CM
LEFT INTERNAL DIMENSION IN SYSTOLE: 3.03 CM (ref 2.1–4)
LEFT VENTRICLE DIASTOLIC VOLUME INDEX: 57.92 ML/M2
LEFT VENTRICLE DIASTOLIC VOLUME: 86.3 ML
LEFT VENTRICLE MASS INDEX: 75 G/M2
LEFT VENTRICLE SYSTOLIC VOLUME INDEX: 24.1 ML/M2
LEFT VENTRICLE SYSTOLIC VOLUME: 35.9 ML
LEFT VENTRICULAR INTERNAL DIMENSION IN DIASTOLE: 4.37 CM (ref 3.5–6)
LEFT VENTRICULAR MASS: 111.78 G
LV LATERAL E/E' RATIO: 8.85 M/S
LVOT MG: 2 MMHG
LVOT MV: 0.58 CM/S
LYMPHOCYTES # BLD AUTO: 2.1 K/UL (ref 1–4.8)
LYMPHOCYTES NFR BLD: 29.6 % (ref 18–48)
MAGNESIUM SERPL-MCNC: 1.9 MG/DL (ref 1.6–2.6)
MAGNESIUM SERPL-MCNC: 1.9 MG/DL (ref 1.6–2.6)
MCH RBC QN AUTO: 28.7 PG (ref 27–31)
MCH RBC QN AUTO: 28.9 PG (ref 27–31)
MCHC RBC AUTO-ENTMCNC: 32.3 G/DL (ref 32–36)
MCHC RBC AUTO-ENTMCNC: 32.5 G/DL (ref 32–36)
MCV RBC AUTO: 89 FL (ref 82–98)
MCV RBC AUTO: 89 FL (ref 82–98)
MONOCYTES # BLD AUTO: 0.7 K/UL (ref 0.3–1)
MONOCYTES NFR BLD: 9.3 % (ref 4–15)
MV MEAN GRADIENT: 2 MMHG
MV PEAK A VEL: 0.42 M/S
MV PEAK E VEL: 1.15 M/S
MV PEAK GRADIENT: 9 MMHG
MV STENOSIS PRESSURE HALF TIME: 68 MS
MV VALVE AREA BY CONTINUITY EQUATION: 2.16 CM2
MV VALVE AREA P 1/2 METHOD: 3.24 CM2
NEUTROPHILS # BLD AUTO: 4 K/UL (ref 1.8–7.7)
NEUTROPHILS NFR BLD: 56.6 % (ref 38–73)
NRBC BLD-RTO: 0 /100 WBC
PISA MRMAX VEL: 3.88 M/S
PISA TR MAX VEL: 3.64 M/S
PLATELET # BLD AUTO: 214 K/UL (ref 150–450)
PLATELET # BLD AUTO: 223 K/UL (ref 150–450)
PMV BLD AUTO: 10.3 FL (ref 9.2–12.9)
PMV BLD AUTO: 10.4 FL (ref 9.2–12.9)
POTASSIUM SERPL-SCNC: 3.8 MMOL/L (ref 3.5–5.1)
POTASSIUM SERPL-SCNC: 4 MMOL/L (ref 3.5–5.1)
PROT SERPL-MCNC: 6.7 G/DL (ref 6–8.4)
PROT SERPL-MCNC: 6.9 G/DL (ref 6–8.4)
PROTHROMBIN TIME: 12.4 SEC (ref 9–12.5)
PV MV: 0.62 M/S
PV PEAK GRADIENT: 3 MMHG
PV PEAK VELOCITY: 0.92 M/S
RA PRESSURE ESTIMATED: 3 MMHG
RBC # BLD AUTO: 4.05 M/UL (ref 4–5.4)
RBC # BLD AUTO: 4.21 M/UL (ref 4–5.4)
RIGHT VENTRICULAR END-DIASTOLIC DIMENSION: 2.76 CM
RV TB RVSP: 7 MMHG
RV TISSUE DOPPLER FREE WALL SYSTOLIC VELOCITY 1 (APICAL 4 CHAMBER VIEW): 11.6 CM/S
SODIUM SERPL-SCNC: 139 MMOL/L (ref 136–145)
SODIUM SERPL-SCNC: 141 MMOL/L (ref 136–145)
T4 FREE SERPL-MCNC: 1.16 NG/DL (ref 0.71–1.51)
TDI LATERAL: 0.13 M/S
TR MAX PG: 53 MMHG
TROPONIN I SERPL HS-MCNC: 11 PG/ML (ref 0–14.9)
TROPONIN I SERPL HS-MCNC: 9.2 PG/ML (ref 0–14.9)
TSH SERPL DL<=0.005 MIU/L-ACNC: 9.14 UIU/ML (ref 0.34–5.6)
TV REST PULMONARY ARTERY PRESSURE: 56 MMHG
WBC # BLD AUTO: 6.56 K/UL (ref 3.9–12.7)
WBC # BLD AUTO: 7.02 K/UL (ref 3.9–12.7)
Z-SCORE OF LEFT VENTRICULAR DIMENSION IN END DIASTOLE: -0.12
Z-SCORE OF LEFT VENTRICULAR DIMENSION IN END SYSTOLE: 0.78

## 2023-11-17 PROCEDURE — 20000000 HC ICU ROOM

## 2023-11-17 PROCEDURE — 85610 PROTHROMBIN TIME: CPT | Performed by: INTERNAL MEDICINE

## 2023-11-17 PROCEDURE — 93306 ECHO (CUPID ONLY): ICD-10-PCS | Mod: 26,,, | Performed by: INTERNAL MEDICINE

## 2023-11-17 PROCEDURE — 99223 PR INITIAL HOSPITAL CARE,LEVL III: ICD-10-PCS | Mod: ,,, | Performed by: INTERNAL MEDICINE

## 2023-11-17 PROCEDURE — 99285 EMERGENCY DEPT VISIT HI MDM: CPT | Mod: 25

## 2023-11-17 PROCEDURE — 96361 HYDRATE IV INFUSION ADD-ON: CPT

## 2023-11-17 PROCEDURE — 99223 1ST HOSP IP/OBS HIGH 75: CPT | Mod: ,,, | Performed by: INTERNAL MEDICINE

## 2023-11-17 PROCEDURE — 84443 ASSAY THYROID STIM HORMONE: CPT | Performed by: EMERGENCY MEDICINE

## 2023-11-17 PROCEDURE — 93306 TTE W/DOPPLER COMPLETE: CPT

## 2023-11-17 PROCEDURE — 85025 COMPLETE CBC W/AUTO DIFF WBC: CPT | Performed by: EMERGENCY MEDICINE

## 2023-11-17 PROCEDURE — 83735 ASSAY OF MAGNESIUM: CPT | Mod: 91 | Performed by: EMERGENCY MEDICINE

## 2023-11-17 PROCEDURE — 80162 ASSAY OF DIGOXIN TOTAL: CPT | Performed by: EMERGENCY MEDICINE

## 2023-11-17 PROCEDURE — 83735 ASSAY OF MAGNESIUM: CPT | Performed by: INTERNAL MEDICINE

## 2023-11-17 PROCEDURE — 93010 EKG 12-LEAD: ICD-10-PCS | Mod: ,,, | Performed by: INTERNAL MEDICINE

## 2023-11-17 PROCEDURE — 63600175 PHARM REV CODE 636 W HCPCS: Performed by: INTERNAL MEDICINE

## 2023-11-17 PROCEDURE — 84484 ASSAY OF TROPONIN QUANT: CPT | Performed by: EMERGENCY MEDICINE

## 2023-11-17 PROCEDURE — G0316 PR PROLONG INPT EVAL EA ADDL 15 MIN: HCPCS | Mod: ,,, | Performed by: INTERNAL MEDICINE

## 2023-11-17 PROCEDURE — 83880 ASSAY OF NATRIURETIC PEPTIDE: CPT | Performed by: EMERGENCY MEDICINE

## 2023-11-17 PROCEDURE — C9113 INJ PANTOPRAZOLE SODIUM, VIA: HCPCS | Performed by: INTERNAL MEDICINE

## 2023-11-17 PROCEDURE — 93005 ELECTROCARDIOGRAM TRACING: CPT | Performed by: INTERNAL MEDICINE

## 2023-11-17 PROCEDURE — 96360 HYDRATION IV INFUSION INIT: CPT

## 2023-11-17 PROCEDURE — 80053 COMPREHEN METABOLIC PANEL: CPT | Performed by: INTERNAL MEDICINE

## 2023-11-17 PROCEDURE — 93306 TTE W/DOPPLER COMPLETE: CPT | Mod: 26,,, | Performed by: INTERNAL MEDICINE

## 2023-11-17 PROCEDURE — 25000003 PHARM REV CODE 250: Performed by: EMERGENCY MEDICINE

## 2023-11-17 PROCEDURE — 93010 ELECTROCARDIOGRAM REPORT: CPT | Mod: ,,, | Performed by: INTERNAL MEDICINE

## 2023-11-17 PROCEDURE — 63600175 PHARM REV CODE 636 W HCPCS: Performed by: STUDENT IN AN ORGANIZED HEALTH CARE EDUCATION/TRAINING PROGRAM

## 2023-11-17 PROCEDURE — 80053 COMPREHEN METABOLIC PANEL: CPT | Mod: 91 | Performed by: EMERGENCY MEDICINE

## 2023-11-17 PROCEDURE — 85027 COMPLETE CBC AUTOMATED: CPT | Performed by: INTERNAL MEDICINE

## 2023-11-17 PROCEDURE — 25000003 PHARM REV CODE 250: Performed by: INTERNAL MEDICINE

## 2023-11-17 PROCEDURE — 84439 ASSAY OF FREE THYROXINE: CPT | Performed by: EMERGENCY MEDICINE

## 2023-11-17 PROCEDURE — G0316 PR PROLONG INPT EVAL EA ADDL 15 MIN: ICD-10-PCS | Mod: ,,, | Performed by: INTERNAL MEDICINE

## 2023-11-17 RX ORDER — SODIUM,POTASSIUM PHOSPHATES 280-250MG
2 POWDER IN PACKET (EA) ORAL
Status: DISCONTINUED | OUTPATIENT
Start: 2023-11-17 | End: 2023-11-22 | Stop reason: HOSPADM

## 2023-11-17 RX ORDER — DOPAMINE HYDROCHLORIDE 160 MG/100ML
0-20 INJECTION, SOLUTION INTRAVENOUS CONTINUOUS
Status: DISCONTINUED | OUTPATIENT
Start: 2023-11-17 | End: 2023-11-17

## 2023-11-17 RX ORDER — MUPIROCIN 20 MG/G
OINTMENT TOPICAL 2 TIMES DAILY
Status: DISPENSED | OUTPATIENT
Start: 2023-11-17 | End: 2023-11-22

## 2023-11-17 RX ORDER — ACETAMINOPHEN 325 MG/1
650 TABLET ORAL EVERY 4 HOURS PRN
Status: DISCONTINUED | OUTPATIENT
Start: 2023-11-17 | End: 2023-11-22 | Stop reason: HOSPADM

## 2023-11-17 RX ORDER — LANOLIN ALCOHOL/MO/W.PET/CERES
800 CREAM (GRAM) TOPICAL
Status: DISCONTINUED | OUTPATIENT
Start: 2023-11-17 | End: 2023-11-22 | Stop reason: HOSPADM

## 2023-11-17 RX ORDER — POLYETHYLENE GLYCOL 3350 17 G/17G
17 POWDER, FOR SOLUTION ORAL DAILY
COMMUNITY
Start: 2023-10-30

## 2023-11-17 RX ORDER — APIXABAN 2.5 MG/1
2.5 TABLET, FILM COATED ORAL 2 TIMES DAILY
COMMUNITY
Start: 2023-11-06

## 2023-11-17 RX ORDER — ONDANSETRON 2 MG/ML
4 INJECTION INTRAMUSCULAR; INTRAVENOUS EVERY 6 HOURS PRN
Status: DISCONTINUED | OUTPATIENT
Start: 2023-11-17 | End: 2023-11-22 | Stop reason: HOSPADM

## 2023-11-17 RX ORDER — SODIUM CHLORIDE, SODIUM LACTATE, POTASSIUM CHLORIDE, CALCIUM CHLORIDE 600; 310; 30; 20 MG/100ML; MG/100ML; MG/100ML; MG/100ML
INJECTION, SOLUTION INTRAVENOUS CONTINUOUS
Status: ACTIVE | OUTPATIENT
Start: 2023-11-17 | End: 2023-11-18

## 2023-11-17 RX ORDER — SODIUM CHLORIDE 9 MG/ML
INJECTION, SOLUTION INTRAVENOUS CONTINUOUS
Status: DISCONTINUED | OUTPATIENT
Start: 2023-11-17 | End: 2023-11-17

## 2023-11-17 RX ORDER — LATANOPROST 50 UG/ML
1 SOLUTION/ DROPS OPHTHALMIC NIGHTLY
COMMUNITY
Start: 2023-11-14

## 2023-11-17 RX ORDER — PANTOPRAZOLE SODIUM 40 MG/10ML
40 INJECTION, POWDER, LYOPHILIZED, FOR SOLUTION INTRAVENOUS DAILY
Status: DISCONTINUED | OUTPATIENT
Start: 2023-11-17 | End: 2023-11-21

## 2023-11-17 RX ORDER — BUSPIRONE HYDROCHLORIDE 5 MG/1
5 TABLET ORAL 3 TIMES DAILY
COMMUNITY

## 2023-11-17 RX ORDER — PROCHLORPERAZINE EDISYLATE 5 MG/ML
2.5 INJECTION INTRAMUSCULAR; INTRAVENOUS EVERY 6 HOURS PRN
Status: DISCONTINUED | OUTPATIENT
Start: 2023-11-17 | End: 2023-11-22 | Stop reason: HOSPADM

## 2023-11-17 RX ADMIN — PANTOPRAZOLE SODIUM 40 MG: 40 INJECTION, POWDER, LYOPHILIZED, FOR SOLUTION INTRAVENOUS at 08:11

## 2023-11-17 RX ADMIN — PROCHLORPERAZINE EDISYLATE 2.5 MG: 5 INJECTION INTRAMUSCULAR; INTRAVENOUS at 05:11

## 2023-11-17 RX ADMIN — SODIUM CHLORIDE 500 ML: 0.9 INJECTION, SOLUTION INTRAVENOUS at 12:11

## 2023-11-17 RX ADMIN — DOPAMINE HYDROCHLORIDE 5 MCG/KG/MIN: 160 INJECTION, SOLUTION INTRAVENOUS at 05:11

## 2023-11-17 RX ADMIN — SODIUM CHLORIDE, SODIUM LACTATE, POTASSIUM CHLORIDE, AND CALCIUM CHLORIDE: .6; .31; .03; .02 INJECTION, SOLUTION INTRAVENOUS at 06:11

## 2023-11-17 RX ADMIN — ONDANSETRON 4 MG: 2 INJECTION INTRAMUSCULAR; INTRAVENOUS at 10:11

## 2023-11-17 RX ADMIN — MUPIROCIN 1 G: 20 OINTMENT TOPICAL at 08:11

## 2023-11-17 RX ADMIN — MUPIROCIN 1 G: 20 OINTMENT TOPICAL at 10:11

## 2023-11-17 NOTE — NURSING
Rec'd pt from ED, Alert and oriented; very anxious. C/o feeling weak and tired. Pt states that she does not want to be shocked or have chest compressions.       Nurses Note -- 4 Eyes      11/17/2023   5:18 AM      Skin assessed during: Admit      [] No Pressure Injuries Present    []Prevention Measures Documented      [x] Yes- Altered Skin Integrity Present or Discovered   [] LDA Added if Not in Epic (Describe Wound)   [] New Altered Skin Integrity was Present on Admit and Documented in LDA   [x] Wound Image Taken    Wound Care Consulted? Yes    Attending Nurse:  Lindsay Segal, RN     Second RN/Staff Member:  Sima Loo RN

## 2023-11-17 NOTE — CONSULTS
[facility]  Department of Cardiology  Consult Note      PATIENT NAME: Jennifer Alarcon  MRN: [id number]  TODAY'S DATE: 11/17/2023  ADMIT DATE: 11/17/2023                          CONSULT REQUESTED BY: Jasen Blackman MD    SUBJECTIVE     PRINCIPAL PROBLEM: Bradycardia      REASON FOR CONSULT:  Bradycardia/SSS      HPI:    Patient patient was a 89-year-old female with past medical history of atrial fibrillation, CHF, hypertension, hyperlipidemia, diabetes mellitus, bradycardia/sick sinus syndrome in July of 2023 at which point she was recommended to have a pacemaker placed but she refused.  She was discharged to a nursing home at that time.  For the past week or heart rate has been in the 40s. Patient states that she has not had her digoxin in 4-5 days due to bradycardia. Prior to arrival heart rate was noted to be in the 30s for which she presented to the ED. In ICU, patient's HR remain in 30s.  BP stable.  Asymptomatic.  Patient didn't tolerate dopamine gtt as it caused her nausea.  Patient wishes to wait until Monday to make a decision on whether she wants to have a permanent pacemaker placed.  TSH 9.143 this a.m..      H&H 12/37, K 4.0, creatinine 0.7, magnesium 1.9.  Elevated , troponin HS 11, repeat 9.2, TSH 9.143.    ECHO 7/17/23  The left ventricle is normal in size with mildly decreased systolic function.  The estimated ejection fraction is 45%.  Grade III left ventricular diastolic dysfunction.  There are segmental left ventricular wall motion abnormalities.  There is abnormal septal wall motion consistent with left bundle branch block.  Normal right ventricular size with normal right ventricular systolic function.  Moderate left atrial enlargement.  Mild-to-moderate mitral regurgitation.  Mild tricuspid regurgitation.  Normal central venous pressure (3 mmHg).  The estimated PA systolic pressure is 25 mmHg.      FROM H&P   Bradycardia       Bradycardia in 40s x1 week, today heart rate 37  at nursing home    Fatigue         HPI: 89 year old patient getting transferred from Nursing home with HR in the lower 30s   Pt was here in this hospital on July 2023 with bradycardia/SSS  At that point she rejected PPM placement   She was later discharged to [facility]      Past 1 week her HR was in the range of 40s  Yesterday it went down to 30 s and pt was transferred here  When saw in ER pts sister was near bedside  Pt extremely anxious when mentioned about PPM and asking time till Monday before she makes any decision   Denies chest pain /SOB/Dizziness         Review of patient's allergies indicates:   Allergen Reactions    Nitrofurantoin monohyd/m-cryst      leg cramps       Past Medical History:   Diagnosis Date    Atrial fibrillation, controlled     Bullous pemphigoid     CHF (congestive heart failure)     Colon cancer     Diabetes mellitus, type 2     Eczema     Hypertension     Hypothyroidism      Past Surgical History:   Procedure Laterality Date    APPENDECTOMY      BREAST SURGERY      lt breast bx    CHOLECYSTECTOMY      COLON SURGERY      EYE SURGERY       Social History     Tobacco Use    Smoking status: Never    Smokeless tobacco: Never   Substance Use Topics    Alcohol use: No    Drug use: No        REVIEW OF SYSTEMS    As mentioned in HPI    OBJECTIVE     VITAL SIGNS (Most Recent)  Temp: 97.5 °F (36.4 °C) (11/17/23 0501)  Pulse: (!) 41 (11/17/23 0900)  Resp: (!) 44 (11/17/23 0900)  BP: 132/60 (11/17/23 0900)  SpO2: (!) 93 % (11/17/23 0900)    VENTILATION STATUS  Resp: (!) 44 (11/17/23 0900)  SpO2: (!) 93 % (11/17/23 0900)           I & O (Last 24H):No intake or output data in the 24 hours ending 11/17/23 0939    WEIGHTS  Wt Readings from Last 3 Encounters:   11/17/23 0501 46.6 kg (102 lb 11.8 oz)   11/17/23 0022 46.7 kg (103 lb)   07/21/23 0311 48.2 kg (106 lb 4.2 oz)   07/20/23 0315 48.5 kg (106 lb 14.8 oz)   07/19/23 0616 49.8 kg (109 lb 12.6 oz)   07/18/23 0350 44.8 kg (98 lb 12.3 oz)    07/17/23 0715 44.6 kg (98 lb 5.2 oz)   07/17/23 0118 44 kg (97 lb)   07/17/23 0924 44 kg (97 lb)       PHYSICAL EXAM    CONSTITUTIONAL: NAD, thin  HEENT: Normocephalic. No pallor  NECK: no JVD  LUNGS: CTA b/l  HEART: Irregular rate and rhythm, bradycardic, S1, S2 normal, no murmur   ABDOMEN: soft, non-tender, bowel sounds normal  EXTREMITIES: No edema  SKIN: No rash  NEURO: AAO X 3  PSYCH: normal affect     HOME MEDICATIONS:  No current facility-administered medications on file prior to encounter.     Current Outpatient Medications on File Prior to Encounter   Medication Sig Dispense Refill    busPIRone (BUSPAR) 5 MG Tab Take 5 mg by mouth 3 (three) times daily.      digoxin (LANOXIN) 125 mcg tablet Take 1 tablet (0.125 mg total) by mouth once daily. 30 tablet 0    ELIQUIS 2.5 mg Tab Take 2.5 mg by mouth 2 (two) times daily.      furosemide (LASIX) 20 MG tablet Take 1 tablet (20 mg total) by mouth daily as needed (swelling/edema).      latanoprost 0.005 % ophthalmic solution Place 1 drop into both eyes every evening.      polyethylene glycol (GLYCOLAX) 17 gram/dose powder Take 17 g by mouth once daily.      bisacodyl (DULCOLAX) 5 mg EC tablet Take 5 mg by mouth daily as needed for Constipation.      fluocinolone acetonide oiL 0.01 % Drop Place 5 drops in ear(s) 2 (two) times daily as needed.      [DISCONTINUED] busPIRone (BUSPAR) 5 MG Tab Take 1 tablet (5 mg total) by mouth 2 (two) times daily as needed (anxiety). 30 tablet 1       SCHEDULED MEDS:   mupirocin   Nasal BID    pantoprazole  40 mg Intravenous Daily       CONTINUOUS INFUSIONS:   DOPamine Stopped (11/17/23 0631)       PRN MEDS:acetaminophen, magnesium oxide, magnesium oxide, ondansetron, potassium bicarbonate, potassium bicarbonate, potassium bicarbonate, potassium, sodium phosphates, potassium, sodium phosphates, potassium, sodium phosphates, prochlorperazine    LABS AND DIAGNOSTICS     CBC LAST 3 DAYS  Recent Labs   Lab 11/17/23  0035 11/17/23  5682  "  WBC 7.02 6.56   RBC 4.21 4.05   HGB 12.1 11.7*   HCT 37.5 36.0*   MCV 89 89   MCH 28.7 28.9   MCHC 32.3 32.5   RDW 14.2 14.1    214   MPV 10.4 10.3   GRAN 56.6  4.0  --    LYMPH 29.6  2.1  --    MONO 9.3  0.7  --    BASO 0.05  --    NRBC 0  --        COAGULATION LAST 3 DAYS  Recent Labs   Lab 11/17/23  0424   INR 1.1       CHEMISTRY LAST 3 DAYS  Recent Labs   Lab 11/17/23 0035 11/17/23  0424    141   K 4.0 3.8    105   CO2 30* 28   ANIONGAP 7* 8   BUN 24 21   CREATININE 0.7 0.5   GLU 96 87   CALCIUM 9.5 9.4   MG 1.9 1.9   ALBUMIN 3.7 3.6   PROT 6.9 6.7   ALKPHOS 46* 44*   ALT 9* 9*   AST 14 14   BILITOT 0.8 0.9       CARDIAC PROFILE LAST 3 DAYS  Recent Labs   Lab 11/17/23 0035 11/17/23  0231   *  --    TROPONINIHS 11.0 9.2       ENDOCRINE LAST 3 DAYS  Recent Labs   Lab 11/17/23  0035   TSH 9.143*       LAST ARTERIAL BLOOD GAS  ABG  No results for input(s): "PH", "PO2", "PCO2", "HCO3", "BE" in the last 168 hours.    LAST 7 DAYS MICROBIOLOGY   Microbiology Results (last 7 days)       ** No results found for the last 168 hours. **            MOST RECENT IMAGING  X-Ray Chest AP Portable  XR CHEST 1 VIEW    CLINICAL HISTORY:  89 years Female Chest Pain    COMPARISON: July 17, 2023    FINDINGS: Cardiac silhouette size is mildly enlarged. Atherosclerotic calcification of the aorta. Pleural parenchymal opacity at the left lung base consistent with small left pleural effusion. Blunting of right costophrenic angle consistent with trace right pleural fluid. No chely pulmonary edema. No confluent airspace disease. No pneumothorax. Osteopenia.    IMPRESSION:    Small volume left and trace right pleural fluid.    Mild cardiomegaly.    Electronically signed by:  Brannon Hopkins MD  11/17/2023 07:13 AM CST Workstation: [id number]      ECHOCARDIOGRAM RESULTS (last 5)  Results for orders placed during the hospital encounter of 07/17/23    Echo    Interpretation Summary  · The left ventricle is normal " in size with mildly decreased systolic function.  · The estimated ejection fraction is 45%.  · Grade III left ventricular diastolic dysfunction.  · There are segmental left ventricular wall motion abnormalities.  · There is abnormal septal wall motion consistent with left bundle branch block.  · Normal right ventricular size with normal right ventricular systolic function.  · Moderate left atrial enlargement.  · Mild-to-moderate mitral regurgitation.  · Mild tricuspid regurgitation.  · Normal central venous pressure (3 mmHg).  · The estimated PA systolic pressure is 25 mmHg.      Results for orders placed during the hospital encounter of 12/29/19    Echo Color Flow Doppler? Yes; Bubble Contrast? No    Interpretation Summary  · Decreased left ventricular systolic function. The estimated ejection fraction is 40%  · Local segmental wall motion abnormalities.  · Atrial fibrillation observed.  · Mild left atrial enlargement.  · Mild right atrial enlargement.  · Mild-to-moderate mitral regurgitation.  · Mild pulmonic regurgitation.  · Normal central venous pressure (3 mm Hg).  · The estimated PA systolic pressure is 32 mm Hg  · There is a left pleural effusion.      CURRENT/PREVIOUS VISIT EKG  Results for orders placed or performed during the hospital encounter of 11/17/23   EKG 12-lead    Collection Time: 11/17/23 12:24 AM    Narrative    Test Reason : R07.9,    Vent. Rate : 043 BPM     Atrial Rate : 258 BPM     P-R Int : 000 ms          QRS Dur : 112 ms      QT Int : 496 ms       P-R-T Axes : 000 -77 090 degrees     QTc Int : 419 ms    Atrial fibrillation with slow ventricular response  Left axis deviation  Low voltage QRS  Inferior infarct ,age undetermined  Cannot rule out Anteroseptal infarct ,age undetermined  Abnormal ECG  When compared with ECG of 17-JUL-2023 01:54,  Significant changes have occurred    Referred By: [email or web address]   SELF           Confirmed By:            ASSESSMENT/PLAN:     Active Hospital Problems     Diagnosis    *Bradycardia    Chronic combined systolic and diastolic heart failure    Sick sinus syndrome    Walker as ambulation aid    Anxiety    Atrial fibrillation, controlled       ASSESSMENT & PLAN:     Atrial Fibrillation with slow ventricular response  SSS  Bradycardia  HFrEF   Elevated TSH      RECOMMENDATIONS:    AF with bradycardia.   History of SSS.  Refused PPM in past. Patient to consider PPM. Will plan for Monday for PPM placement.  TSH 9.143.  Normal Free T4.  Will defer to primary team.  Patient reports that she has not received digoxin in 4-5 days PTA.   Palliative care consulted.  Patient did not tolerate dopamine gtt due to nausea.  Recommend continued close cardiac monitoring and external pacing pads on patient.   Continue to check and replace potassium and magnesium. Goal for potassium is 4.0, and goal for magnesium is 2.0.    Echo 7/17/23- EF 45% with Grade III diastolic dysfunction. Elevated BNP.  CXR shows small L and R pleural fluid.  Euvolemic during exam.  Will hold off on diuresis.  Thank you for the consult.  We will follow.       Bethany Grace NP  Department of Cardiology  Date of Service: 11/17/2023

## 2023-11-17 NOTE — HPI
89 year old patient getting transferred from Nursing home with HR in the lower 30s   Pt was here in this hospital on July 2023 with bradycardia/SSS  At that point she rejected PPM placement   She was later discharged to [facility]     Past 1 week her HR was in the range of 40s  Yesterday it went down to 30 s and pt was transferred here  When saw in ER pts sister was near bedside  Pt extremely anxious when mentioned about PPM and asking time till Monday before she makes any decision   Denies chest pain /SOB/Dizziness

## 2023-11-17 NOTE — Clinical Note
Diagnosis: Bradycardia [071905]   Future Attending Provider: HERIBERTO ROCKWELL [[id number]]   Admitting Provider:: HERIBERTO ROCKWELL [[id number]]   Admit to which facility:: [facility] [[id number]]   Reason for IP Medical Treatment  (Clinical interventions that can only be accomplished in the IP setting? ) :: need rx   I certify that Inpatient services for greater than or equal to 2 midnights are medically necessary:: Yes   Plans for Post-Acute care--if anticipated (pick the single best option):: A. No post acute care anticipated at this time   Special Needs:: No Special Needs [1]

## 2023-11-17 NOTE — HPI
"Per admit H&P: "89 year old patient getting transferred from Nursing home with HR in the lower 30s   Pt was here in this hospital on July 2023 with bradycardia/SSS  At that point she rejected PPM placement   She was later discharged to [facility]      Past 1 week her HR was in the range of 40s  Yesterday it went down to 30 s and pt was transferred here  When saw in ER pts sister was near bedside  Pt extremely anxious when mentioned about PPM and asking time till Monday before she makes any decision   Denies chest pain /SOB/Dizziness "    She has been evaluated by Cardiology whom is recommended pacemaker placement.  She was hesitant to pursue pacemaker placement.  In the absence of pacemaker placement hospice should be considered.  I have been asked to assist with these discussions.  "

## 2023-11-17 NOTE — NURSING
Dx: Admission Diagnosis: Bradycardia    Shift Events: Started dopamine gtt; stopped due to pt retching violently despite nausea meds given. Pt is very anxious.     Neuro: A&O x4.     Vital Signs: Last documentation =  Temp: 97.5 °F (36.4 °C) (11/17/23 0501)  Pulse: (!) 41 (11/17/23 0716)  Resp: 20 (11/17/23 0716)  BP: (!) 121/58 (11/17/23 0701)  SpO2: 96 % (11/17/23 0716)     Respiratory: Room air    Skin: Small break down to sacrum, cream applied with mepilex    Lines:        Peripheral IV - Single Lumen 11/17/23 0034 20 G Anterior;Right Forearm (Active)   Site Assessment Clean;Dry;Intact 11/17/23 0501   Extremity Assessment Distal to IV No abnormal discoloration;No redness;No swelling;No warmth 11/17/23 0501   Line Status Flushed 11/17/23 0501   Dressing Status Intact 11/17/23 0501   Dressing Intervention Sterile dressing change 11/17/23 0501   Dressing Change Due 11/20/23 11/17/23 0501   Site Change Due 11/20/23 11/17/23 0501   Reason Not Rotated Not due 11/17/23 0501   Number of days: 0            Peripheral IV - Single Lumen 11/17/23 0430 Posterior;Left Wrist (Active)   Site Assessment Clean;Dry;Intact;No redness;No swelling 11/17/23 0501   Extremity Assessment Distal to IV No abnormal discoloration;No redness;No swelling;No warmth 11/17/23 0501   Line Status Flushed 11/17/23 0501   Dressing Status Clean;Dry;Intact 11/17/23 0501   Dressing Intervention Integrity maintained 11/17/23 0501   Dressing Change Due 11/20/23 11/17/23 0501   Site Change Due 11/20/23 11/17/23 0501   Reason Not Rotated Not due 11/17/23 0501   Number of days: 0       Gtts: IV fluids; dopamine gtt    Diet: NPO    UOP: Last documentation =  200 cc        Labs/Accucheck:   Lab Results   Component Value Date    WBC 6.56 11/17/2023    RBC 4.05 11/17/2023    HGB 11.7 (L) 11/17/2023    MCV 89 11/17/2023    MCH 28.9 11/17/2023    MCHC 32.5 11/17/2023    RDW 14.1 11/17/2023     11/17/2023    MPV 10.3 11/17/2023    GRAN 4.0 11/17/2023    GRAN  56.6 11/17/2023    LYMPH 2.1 11/17/2023    LYMPH 29.6 11/17/2023    MONO 0.7 11/17/2023    MONO 9.3 11/17/2023    EOS 0.3 11/17/2023    BASO 0.05 11/17/2023     BMP   Lab Results   Component Value Date     11/17/2023    K 3.8 11/17/2023     11/17/2023    CO2 28 11/17/2023    BUN 21 11/17/2023    CREATININE 0.5 11/17/2023    CALCIUM 9.4 11/17/2023    ANIONGAP 8 11/17/2023    ESTGFRAFRICA >60.0 01/10/2020    EGFRNONAA 74 03/16/2021

## 2023-11-17 NOTE — CARE UPDATE
Assumed care of patient at 7am. NAEON. Feeling fatigued. Did not tolerate dopamine infusion due to nausea. Heart rates staying mostly 35-45. BP stable. I spoke with the patient, patient's sister, patient's son, and patient's daughter about her options. Patient seems to want to pursue pacemaker but is hesitant. She was also unclear with me regarding code status. Palliative care has been consulted.    - continue to monitor her close and keep pacer pads on and use atropine prn  - follow cardiology and palliative care recshahrzad Blackman MD  [facility]

## 2023-11-17 NOTE — ED PROVIDER NOTES
Encounter Date: 11/17/2023       History     Chief Complaint   Patient presents with    Bradycardia     Bradycardia in 40s x1 week, today heart rate 37 at nursing home    Fatigue     Emergent evaluation of a 89-year-old female  with history of hypertension, AFib on Eliquis, type 2 diabetes, hypothyroidism, colon cancer, CHF, who was admitted in July 2023 for bradycardia and evaluation for possible sick sinus syndrome and was started on digoxin was not interested in pacemaker placement and was discharged to [facility] presents to the ER from [place] due to heart rate of 36 patient has reportedly had a heart rate in the 40s all week but tonight had a heart rate of 36 and so was woken up and brought to the ER she reports that she feels mildly weak and fatigued but she reports she does not sleep well due to frequently being woken up for vital sign checks and being given Lasix at 5:00 a.m..  She reports she was now feeling short of breath but she was also anxious particularly with the pacer pads on her chest though she was not being paced.  In July she reports she was being paced and she was nervous about having that happened again.  She denies any chest pain no nausea or vomiting.  She denies any other symptoms  cardiologist Dr. Horan            Review of patient's allergies indicates:   Allergen Reactions    Nitrofurantoin monohyd/m-cryst      leg cramps     Past Medical History:   Diagnosis Date    Atrial fibrillation, controlled     Bullous pemphigoid     CHF (congestive heart failure)     Colon cancer     Diabetes mellitus, type 2     Eczema     Hypertension     Hypothyroidism      Past Surgical History:   Procedure Laterality Date    APPENDECTOMY      BREAST SURGERY      lt breast bx    CHOLECYSTECTOMY      COLON SURGERY      EYE SURGERY       Family History   Problem Relation Age of Onset    Diabetes Mother     Heart disease Mother     Heart disease Father     Diabetes Father      Social History     Tobacco Use     Smoking status: Never    Smokeless tobacco: Never   Substance Use Topics    Alcohol use: No    Drug use: No     Review of Systems   Constitutional:  Negative for activity change, appetite change, chills, diaphoresis, fatigue and fever.   HENT:  Negative for congestion, postnasal drip, rhinorrhea and sore throat.    Respiratory:  Positive for shortness of breath. Negative for cough, chest tightness, wheezing and stridor.    Cardiovascular:  Negative for chest pain and palpitations.   Gastrointestinal:  Negative for abdominal distention, abdominal pain, blood in stool, constipation, diarrhea, nausea and vomiting.   Genitourinary:  Negative for dysuria, flank pain, frequency, hematuria and urgency.   Musculoskeletal:  Negative for arthralgias, back pain, myalgias, neck pain and neck stiffness.   Skin:  Negative for rash.   Neurological:  Positive for weakness. Negative for dizziness, syncope, light-headedness and headaches.   Hematological:  Does not bruise/bleed easily.   Psychiatric/Behavioral:  Negative for agitation and confusion. The patient is nervous/anxious.    All other systems reviewed and are negative.      Physical Exam     Initial Vitals   BP Pulse Resp Temp SpO2   11/17/23 0022 11/17/23 0022 11/17/23 0022 11/17/23 0024 11/17/23 0022   (!) 189/84 (!) 44 20 97.8 °F (36.6 °C) 96 %      MAP       --                Physical Exam    Nursing note and vitals reviewed.  Constitutional: She appears well-developed. She is not diaphoretic. No distress.   Thin female   HENT:   Head: Normocephalic and atraumatic.   Right Ear: External ear normal.   Left Ear: External ear normal.   Nose: Nose normal.   Dry mucous membranes    Patient was hard of hearing   Eyes: Conjunctivae and EOM are normal. Pupils are equal, round, and reactive to light.   Neck: Neck supple. No tracheal deviation present.   Normal range of motion.  Cardiovascular:  Regular rhythm, normal heart sounds and intact distal pulses.     Exam reveals no  gallop and no friction rub.       No murmur heard.  Heart rate 36-42 slow AFib  Blood pressure 172/84   Pulmonary/Chest: Breath sounds normal. No stridor. No respiratory distress. She has no wheezes. She has no rhonchi. She has no rales. She exhibits no tenderness.   98% on room air respirations 20 clear breath sounds bilaterally   Abdominal: Abdomen is soft. Bowel sounds are normal. She exhibits no distension and no mass. There is no abdominal tenderness. There is no rebound and no guarding.   Musculoskeletal:         General: No edema. Normal range of motion.      Cervical back: Normal range of motion and neck supple.     Neurological: She is alert and oriented to person, place, and time. She has normal strength. No cranial nerve deficit or sensory deficit.   Skin: Skin is warm and dry. No rash noted. No erythema. No pallor.   Psychiatric: Her behavior is normal. Judgment and thought content normal.   Anxious         ED Course   Procedures  Labs Reviewed   COMPREHENSIVE METABOLIC PANEL - Abnormal; Notable for the following components:       Result Value    CO2 30 (*)     Alkaline Phosphatase 46 (*)     ALT 9 (*)     Anion Gap 7 (*)     All other components within normal limits   B-TYPE NATRIURETIC PEPTIDE - Abnormal; Notable for the following components:     (*)     All other components within normal limits   TSH - Abnormal; Notable for the following components:    TSH 9.143 (*)     All other components within normal limits   MAGNESIUM   CBC W/ AUTO DIFFERENTIAL   TROPONIN I HIGH SENSITIVITY   TSH   DIGOXIN LEVEL   DIGOXIN LEVEL   TROPONIN I HIGH SENSITIVITY   T4, FREE     EKG Readings: (Independently Interpreted)   Initial Reading: No STEMI. Heart Rate: 43. Ectopy: No Ectopy. Conduction: Normal. Axis: Left Axis Deviation.   Low AFib       Imaging Results              X-Ray Chest AP Portable (In process)                   X-Rays:   Independently Interpreted Readings:   Chest X-Ray: Normal heart size.  No  infiltrates.  No acute abnormalities.     Medications   sodium chloride 0.9% bolus 500 mL 500 mL (500 mLs Intravenous New Bag 11/17/23 0039)     Medical Decision Making  Emergent evaluation of a 89-year-old female  with history of hypertension, AFib on Eliquis, type 2 diabetes, hypothyroidism, colon cancer, CHF, who was admitted in July 2023 for bradycardia and evaluation for possible sick sinus syndrome and was started on digoxin was not interested in pacemaker placement and was discharged to [facility] presents to the ER from [place] due to heart rate of 36 patient has reportedly had a heart rate in the 40s all week but tonight had a heart rate of 36 and so was woken up and brought to the ER she reports that she feels mildly weak and fatigued but she reports she does not sleep well due to frequently being woken up for vital sign checks and being given Lasix at 5:00 a.m..  She reports she was now feeling short of breath but she was also anxious particularly with the pacer pads on her chest though she was not being paced.  In July she reports she was being paced and she was nervous about having that happened again.  She denies any chest pain no nausea or vomiting.  She denies any other symptoms  cardiologist Dr. Horan   On physical exam heart rate is 36-40 slow AFib blood pressure 172/84 temperature 97.8° sats 98% on room air respirations 20 patient was sitting up in bed in no distress mildly dry mucous membranes in his very thin in appearance she was the pacer pads on her chest and is very anxious about the being present.  I have tried to reassure her that there not being used in LEs she becomes symptomatic.  MDM    Patient presents for emergent evaluation of acute bradycardia that poses a possible threat to life and/or bodily function.    Differential diagnosis includes but is not limited to bradycardia, slow AFib, heart block, CHF, MI.  In the ED patient found to have acute bradycardia with elevated TSH.   I  ordered labs and personally reviewed them.  Labs significant for see below   I ordered X-rays and personally reviewed them and reviewed the radiologist interpretation.  Xray significant for see below.    I ordered EKG and personally reviewed it.  EKG significant for see below    Admission MDM  I discussed the patient presentation labs, ekg, X-rays, CT findings with the Hospitalist   Patient was managed in the ED with  mL normal saline here.  The response to treatment was good.    Patient required emergent consultation to hospitalist for admission.  Claudia Curtis M.D.       Amount and/or Complexity of Data Reviewed  Labs: ordered.     Details: Magnesium 1.9  Normal CBC  Normal CMP  Troponin of 11    TSH 9.143  Radiology: ordered and independent interpretation performed.  ECG/medicine tests: ordered and independent interpretation performed.    Risk  Decision regarding hospitalization.                                   Clinical Impression:  Final diagnoses:  [R07.9] Chest pain  [R00.1] Bradycardia (Primary)  [R53.1] Weakness  [F41.8] Anxiety about health  [R79.89] Abnormal TSH          ED Disposition Condition    Admit Stable                Claudia Curtis MD  11/17/23 0214

## 2023-11-17 NOTE — ASSESSMENT & PLAN NOTE
ECHO  Cardiology consult  Pacers on chest  Start iv dopamine  Admit in ICU  Pt keep on asking for more time before making a final decision regarding PPM

## 2023-11-17 NOTE — H&P
[facility] - Emergency Dept  Hospital Medicine  History & Physical    Patient Name: Jennifer Alarcon  MRN: [id number]  Patient Class: IP- Inpatient  Admission Date: 11/17/2023  Attending Physician: Francisco Jackson MD   Primary Care Provider: Tyrel Gonzales Jr., MD         Patient information was obtained from patient, past medical records, and ER records.     Subjective:     Principal Problem:Bradycardia    Chief Complaint:   Chief Complaint   Patient presents with    Bradycardia     Bradycardia in 40s x1 week, today heart rate 37 at nursing home    Fatigue        HPI: 89 year old patient getting transferred from Nursing home with HR in the lower 30s   Pt was here in this hospital on July 2023 with bradycardia/SSS  At that point she rejected PPM placement   She was later discharged to [facility]     Past 1 week her HR was in the range of 40s  Yesterday it went down to 30 s and pt was transferred here  When saw in ER pts sister was near bedside  Pt extremely anxious when mentioned about PPM and asking time till Monday before she makes any decision   Denies chest pain /SOB/Dizziness     Past Medical History:   Diagnosis Date    Atrial fibrillation, controlled     Bullous pemphigoid     CHF (congestive heart failure)     Colon cancer     Diabetes mellitus, type 2     Eczema     Hypertension     Hypothyroidism        Past Surgical History:   Procedure Laterality Date    APPENDECTOMY      BREAST SURGERY      lt breast bx    CHOLECYSTECTOMY      COLON SURGERY      EYE SURGERY         Review of patient's allergies indicates:   Allergen Reactions    Nitrofurantoin monohyd/m-cryst      leg cramps       No current facility-administered medications on file prior to encounter.     Current Outpatient Medications on File Prior to Encounter   Medication Sig    bisacodyl (DULCOLAX) 5 mg EC tablet Take 5 mg by mouth daily as needed for Constipation.    busPIRone (BUSPAR) 5 MG Tab Take 1 tablet (5 mg total) by mouth 2  (two) times daily as needed (anxiety).    digoxin (LANOXIN) 125 mcg tablet Take 1 tablet (0.125 mg total) by mouth once daily.    fluocinolone acetonide oiL 0.01 % Drop Place 5 drops in ear(s) 2 (two) times daily as needed.    furosemide (LASIX) 20 MG tablet Take 1 tablet (20 mg total) by mouth daily as needed (swelling/edema).     Family History       Problem Relation (Age of Onset)    Diabetes Mother, Father    Heart disease Mother, Father          Tobacco Use    Smoking status: Never    Smokeless tobacco: Never   Substance and Sexual Activity    Alcohol use: No    Drug use: No    Sexual activity: Never     Review of Systems   Constitutional:  Negative for activity change and appetite change.   HENT:  Negative for congestion and dental problem.    Eyes:  Negative for discharge and itching.   Respiratory:  Negative for shortness of breath.    Cardiovascular:  Positive for leg swelling. Negative for chest pain.   Gastrointestinal:  Negative for abdominal distention and abdominal pain.   Endocrine: Negative for cold intolerance.   Genitourinary:  Negative for difficulty urinating and dysuria.   Musculoskeletal:  Negative for arthralgias and back pain.   Skin:  Negative for color change.   Neurological:  Negative for dizziness and facial asymmetry.   Hematological:  Negative for adenopathy.   Psychiatric/Behavioral:  Negative for agitation and behavioral problems.      Objective:     Vital Signs (Most Recent):  Temp: 97.8 °F (36.6 °C) (11/17/23 0024)  Pulse: (!) 42 (11/17/23 0230)  Resp: 20 (11/17/23 0230)  BP: (!) 167/74 (11/17/23 0230)  SpO2: 98 % (11/17/23 0230) Vital Signs (24h Range):  Temp:  [97.8 °F (36.6 °C)] 97.8 °F (36.6 °C)  Pulse:  [38-44] 42  Resp:  [20] 20  SpO2:  [96 %-98 %] 98 %  BP: (167-189)/(74-87) 167/74     Weight: 46.7 kg (103 lb)  Body mass index is 17.14 kg/m².     Physical Exam  Vitals and nursing note reviewed.   Constitutional:       General: She is not in acute distress.  HENT:      Head:  Atraumatic.      Right Ear: External ear normal.      Left Ear: External ear normal.      Nose: Nose normal.      Mouth/Throat:      Mouth: Mucous membranes are dry.   Eyes:      Extraocular Movements: Extraocular movements intact.   Cardiovascular:      Rate and Rhythm: Normal rate.   Pulmonary:      Effort: Pulmonary effort is normal.   Musculoskeletal:         General: Normal range of motion.      Cervical back: Normal range of motion.      Right lower leg: Edema present.      Left lower leg: Edema present.   Skin:     General: Skin is warm.   Neurological:      Mental Status: She is alert and oriented to person, place, and time.   Psychiatric:         Behavior: Behavior normal.                Significant Labs: All pertinent labs within the past 24 hours have been reviewed.  CBC:   Recent Labs   Lab 11/17/23 0035   WBC 7.02   HGB 12.1   HCT 37.5        CMP:   Recent Labs   Lab 11/17/23 0035      K 4.0      CO2 30*   GLU 96   BUN 24   CREATININE 0.7   CALCIUM 9.5   PROT 6.9   ALBUMIN 3.7   BILITOT 0.8   ALKPHOS 46*   AST 14   ALT 9*   ANIONGAP 7*       Significant Imaging: I have reviewed all pertinent imaging results/findings within the past 24 hours.      Assessment/Plan:     * Bradycardia  ECHO  Cardiology consult  Pacers on chest  Start iv dopamine  Admit in ICU  Pt keep on asking for more time before making a final decision regarding PPM       Sick sinus syndrome  As above       Chronic combined systolic and diastolic heart failure  On PO lasix       Walker as ambulation aid  Aware       Anxiety  Ongoing issue       Atrial fibrillation, controlled  Stable  Used to be on NOAC   Unaware she still is on NOAC       VTE Risk Mitigation (From admission, onward)           Ordered     IP VTE HIGH RISK PATIENT  Once         11/17/23 0306     Place sequential compression device  Until discontinued         11/17/23 0306                                   Francisco Jackson MD  Department of Hospital  Medicine  [facility] - Emergency Dept    Pertinent information:

## 2023-11-17 NOTE — CONSULTS
[facility]  Palliative Medicine  Consult Note    Patient Name: Jennifer Alarcon  MRN: [id number]  Admission Date: 11/17/2023  Hospital Length of Stay: 0 days  Code Status: DNR   Attending Provider: Jasen Blackman MD  Consulting Provider: Young Love MD  Primary Care Physician: Tyrel Gonzales Jr., MD  Principal Problem:Bradycardia    Patient information was obtained from patient, relative(s), past medical records, and primary team.      Inpatient consult to Palliative Care  Consult performed by: Young Love MD  Consult ordered by: Jasen Blackman MD        Assessment/Plan:     Palliative Care  Goals of care, counseling/discussion  I reviewed her chart and discussed her case with her team.      I examined Ms. Alarcon at bedside.  She maintains capacity for complex medical decision-making.  I also spoke with her daughter, Sahra, on a separate occasion by phone.  Below is a brief summary of these discussions.      I introduced myself and my role as palliative care physician.  They were agreeable to speaking.      We discussed her medical illness, prognosis, and values in detail.      Briefly, they understand that she is currently admitted with a very slow heart rate.  They understand she was being considered for pacemaker placement which she was hesitant to pursue.      We discussed her prognosis.  I explained my worry if she does nothing her time is likely limited to weeks to maybe months.  If she pursues pacemaker placement then I suspect her prognosis would be extended to many months and even potentially years.    We discussed her values.  She was not leaving her ideal quality of life at baseline, however is living a reasonable quality of life that she would wish to prolong.  She was not sure what more she is willing to go through.  She was still very uncertain about pursuing pacemaker placement.  She was many worries mostly surrounding her family.  She generally has many fears and does  "fear dying.  She fears potentially suffering.  She was generally anxious and not really able to pinpoint the source of her anxiety.      We spoke back and forth.  They seemed to understand we were at with her medical issues.      We discussed the path forward again.  In 1 path she can consider pacemaker placement.  In the other path she can consider hospice.  I spoke in detail about hospice and the philosophy of hospice care.  Neither she nor her daughter particularly opposed to hospice.  The patient is not sure which route she wants to pursue.  Her daughter wants to honor her mother's wishes and will ultimately support any decision that she chooses.  Over the weekend her daughter, 2 sons, and sister are going to sit down and talk with the patient about where she wants to go from here.    We also took a moment to discuss code status and the clinical realities of a code and her situation.  She would not desire chest compressions, invasive mechanical ventilation, or electrical shocks.  She would be okay with pharmaceutical management to manage her arrhythmia.  I have changed her code status to DNR and updated her primary team.      I appreciate being involved.  I hope I have been helpful.          Thank you for your consult. I will follow-up with patient. Please contact us if you have any additional questions.    Subjective:     HPI:   Per admit H&P: "89 year old patient getting transferred from Nursing home with HR in the lower 30s   Pt was here in this hospital on July 2023 with bradycardia/SSS  At that point she rejected PPM placement   She was later discharged to [facility] home      Past 1 week her HR was in the range of 40s  Yesterday it went down to 30 s and pt was transferred here  When saw in ER pts sister was near bedside  Pt extremely anxious when mentioned about PPM and asking time till Monday before she makes any decision   Denies chest pain /SOB/Dizziness "    She has been evaluated by Cardiology whom is " recommended pacemaker placement.  She was hesitant to pursue pacemaker placement.  In the absence of pacemaker placement hospice should be considered.  I have been asked to assist with these discussions.    Hospital Course:  No notes on file    Interval History: See HPI    Past Medical History:   Diagnosis Date    Atrial fibrillation, controlled     Bullous pemphigoid     CHF (congestive heart failure)     Colon cancer     Diabetes mellitus, type 2     Eczema     Hypertension     Hypothyroidism        Past Surgical History:   Procedure Laterality Date    APPENDECTOMY      BREAST SURGERY      lt breast bx    CHOLECYSTECTOMY      COLON SURGERY      EYE SURGERY         Review of patient's allergies indicates:   Allergen Reactions    Nitrofurantoin monohyd/m-cryst      leg cramps       Medications:  Continuous Infusions:  Scheduled Meds:   mupirocin   Nasal BID    pantoprazole  40 mg Intravenous Daily     PRN Meds:acetaminophen, magnesium oxide, magnesium oxide, ondansetron, potassium bicarbonate, potassium bicarbonate, potassium bicarbonate, potassium, sodium phosphates, potassium, sodium phosphates, potassium, sodium phosphates, prochlorperazine    Family History       Problem Relation (Age of Onset)    Diabetes Mother, Father    Heart disease Mother, Father          Tobacco Use    Smoking status: Never    Smokeless tobacco: Never   Substance and Sexual Activity    Alcohol use: No    Drug use: No    Sexual activity: Never       Review of Systems  Objective:     Vital Signs (Most Recent):  Temp: 97 °F (36.1 °C) (11/17/23 0745)  Pulse: (!) 44 (11/17/23 1645)  Resp: (!) 29 (11/17/23 1645)  BP: (!) 140/99 (11/17/23 1630)  SpO2: (!) 93 % (11/17/23 1645) Vital Signs (24h Range):  Temp:  [97 °F (36.1 °C)-98 °F (36.7 °C)] 97 °F (36.1 °C)  Pulse:  [38-54] 44  Resp:  [20-50] 29  SpO2:  [90 %-98 %] 93 %  BP: (116-189)/(55-99) 140/99     Weight: 46.6 kg (102 lb 11.8 oz)  Body mass index is 17.1 kg/m².       Physical Exam  Vitals  reviewed.   Constitutional:       General: She is not in acute distress.     Appearance: She is ill-appearing.   HENT:      Head: Normocephalic and atraumatic.      Right Ear: External ear normal.      Left Ear: External ear normal.      Nose: Nose normal. No congestion.      Mouth/Throat:      Mouth: Mucous membranes are moist.      Pharynx: No oropharyngeal exudate.   Eyes:      General:         Right eye: No discharge.         Left eye: No discharge.      Extraocular Movements: Extraocular movements intact.   Cardiovascular:      Rate and Rhythm: Bradycardia present.      Pulses: Normal pulses.   Pulmonary:      Effort: Pulmonary effort is normal. No respiratory distress.   Abdominal:      General: There is no distension.      Palpations: Abdomen is soft.      Tenderness: There is no abdominal tenderness.   Skin:     General: Skin is warm.   Neurological:      General: No focal deficit present.      Mental Status: She is alert and oriented to person, place, and time.   Psychiatric:         Mood and Affect: Mood normal.         Behavior: Behavior normal.         Thought Content: Thought content normal.         Judgment: Judgment normal.            Review of Symptoms      Symptom Assessment (ESAS 0-10 Scale)  Pain:  0  Dyspnea:  2  Anxiety:  7  Nausea:  0  Depression:  2  Anorexia:  3  Fatigue:  5  Insomnia:  0  Restlessness:  0  Agitation:  0         Performance Status:  50    Living Arrangements:  Lives in nursing home    Psychosocial/Cultural:   See Palliative Psychosocial Note: No  **Primary  to Follow**  Palliative Care  Consult: No        Advance Care Planning  Advance Directives:   Do Not Resuscitate Status: Yes      Decision Making:  Patient answered questions and Family answered questions  Goals of Care: The patient and family endorses that what is most important right now is to focus on spending time at home, avoiding the hospital, remaining as independent as possible, and  "symptom/pain control    Accordingly, we have decided that the best plan to meet the patient's goals includes continuing with treatment         Significant Labs: BMP:   Recent Labs   Lab 11/17/23 0424   GLU 87      K 3.8      CO2 28   BUN 21   CREATININE 0.5   CALCIUM 9.4   MG 1.9     CBC:   Recent Labs   Lab 11/17/23  0035 11/17/23 0424   WBC 7.02 6.56   HGB 12.1 11.7*   HCT 37.5 36.0*    214     CBC:   Recent Labs   Lab 11/17/23 0424   WBC 6.56   HGB 11.7*   HCT 36.0*   MCV 89        BMP:  Recent Labs   Lab 11/17/23 0424   GLU 87      K 3.8      CO2 28   BUN 21   CREATININE 0.5   CALCIUM 9.4   MG 1.9     LFT:  Lab Results   Component Value Date    AST 14 11/17/2023    ALKPHOS 44 (L) 11/17/2023    BILITOT 0.9 11/17/2023     Albumin:   Albumin   Date Value Ref Range Status   11/17/2023 3.6 3.5 - 5.2 g/dL Final     Protein:   Total Protein   Date Value Ref Range Status   11/17/2023 6.7 6.0 - 8.4 g/dL Final     Lactic acid:   No results found for: "LACTATE"    Significant Imaging: I have reviewed all pertinent imaging results/findings within the past 24 hours.      I spent a total of 120 minutes on the day of the visit. This includes face to face time in discussion of goals of care, symptom assessment, coordination of care and emotional support.  This also includes non-face to face time preparing to see the patient (eg, review of tests/imaging), obtaining and/or reviewing separately obtained history, documenting clinical information in the electronic or other health record, independently interpreting results and communicating results to the patient/family/caregiver, or care coordinator.    Young Love MD  Palliative Medicine  Critical access hospital              "

## 2023-11-17 NOTE — PLAN OF CARE
[facility]  Initial Discharge Assessment       Primary Care Provider: Tyrel Gonzales Jr., MD    Admission Diagnosis: Bradycardia [R00.1]    Admission Date: 11/17/2023  Expected Discharge Date:    completed discharge assessment with Pt's sister via telephone. [facility]. Demographics, PCP, and insurance verified. No home health. No dialysis. Pt completes ADLs with the assistance of a wheelchair. Pt to discharge back to [place] via facility transport. Pt has no other needs to be addressed at this time.    Transition of Care Barriers: None    Payor: MEDICARE / Plan: MEDICARE PART A & B / Product Type: Government /     Extended Emergency Contact Information  Primary Emergency Contact: Sahra Alarcon  Home Phone: [phone]  Mobile Phone: [phone]  Relation: Daughter  Preferred language: English   needed? No  Secondary Emergency Contact: Kevin Alarcon Jr.  Address: [address] [place] [address] [id number] [place] [address]  Home Phone: [phone]  Mobile Phone: [phone]  Relation: Son  Preferred language: English   needed? No    Discharge Plan A: Return to nursing home  Discharge Plan B: Return to Nursing Home      [id number] - [place], [place] - [address] [facility]  [address]  [facility] [address]  Phone: [phone] Fax: [phone]      Initial Assessment (most recent)       Adult Discharge Assessment - 11/17/23 1047          Discharge Assessment    Assessment Type Discharge Planning Assessment     Confirmed/corrected address, phone number and insurance Yes     Confirmed Demographics Correct on Facesheet     Source of Information family     If unable to respond/provide information was family/caregiver contacted? Yes     Contact Name/Number Nakia Pruett [phone]     Does patient/caregiver understand observation status Yes     Communicated JASMYNE with  patient/caregiver Date not available/Unable to determine     Reason For Admission Bradycardia     People in Home facility resident     Facility Arrived From: [facility]     Do you expect to return to your current living situation? Yes     Prior to hospitilization cognitive status: Unable to Assess     Current cognitive status: Unable to Assess     Walking or Climbing Stairs ambulation difficulty, requires equipment     Mobility Management wheelchair     Dressing/Bathing bathing difficulty, requires equipment     Home Accessibility wheelchair accessible     Home Layout Able to live on 1st floor     Equipment Currently Used at Home wheelchair;blood pressure machine;glucometer     Readmission within 30 days? No     Patient currently being followed by outpatient case management? No     Do you currently have service(s) that help you manage your care at home? No     Do you take prescription medications? Yes     Do you have prescription coverage? Yes     Coverage Payor: MEDICARE - MEDICARE PART A & B     Do you have any problems affording any of your prescribed medications? No     Is the patient taking medications as prescribed? yes     How do you get to doctors appointments? other (see comments)     Are you on dialysis? No     Do you take coumadin? No     DME Needed Upon Discharge  none     Discharge Plan discussed with: Sibling     Transition of Care Barriers None     Discharge Plan A Return to nursing home     Discharge Plan B Return to Nursing Home

## 2023-11-17 NOTE — ASSESSMENT & PLAN NOTE
I reviewed her chart and discussed her case with her team.      I examined Ms. Alarcon at bedside.  She maintains capacity for complex medical decision-making.  I also spoke with her daughter, Sahra, on a separate occasion by phone.  Below is a brief summary of these discussions.      I introduced myself and my role as palliative care physician.  They were agreeable to speaking.      We discussed her medical illness, prognosis, and values in detail.      Briefly, they understand that she is currently admitted with a very slow heart rate.  They understand she was being considered for pacemaker placement which she was hesitant to pursue.      We discussed her prognosis.  I explained my worry if she does nothing her time is likely limited to weeks to maybe months.  If she pursues pacemaker placement then I suspect her prognosis would be extended to many months and even potentially years.    We discussed her values.  She was not leaving her ideal quality of life at baseline, however is living a reasonable quality of life that she would wish to prolong.  She was not sure what more she is willing to go through.  She was still very uncertain about pursuing pacemaker placement.  She was many worries mostly surrounding her family.  She generally has many fears and does fear dying.  She fears potentially suffering.  She was generally anxious and not really able to pinpoint the source of her anxiety.      We spoke back and forth.  They seemed to understand we were at with her medical issues.      We discussed the path forward again.  In 1 path she can consider pacemaker placement.  In the other path she can consider hospice.  I spoke in detail about hospice and the philosophy of hospice care.  Neither she nor her daughter particularly opposed to hospice.  The patient is not sure which route she wants to pursue.  Her daughter wants to honor her mother's wishes and will ultimately support any decision that she chooses.  Over  the weekend her daughter, 2 sons, and sister are going to sit down and talk with the patient about where she wants to go from here.    We also took a moment to discuss code status and the clinical realities of a code and her situation.  She would not desire chest compressions, invasive mechanical ventilation, or electrical shocks.  She would be okay with pharmaceutical management to manage her arrhythmia.  I have changed her code status to DNR and updated her primary team.      I appreciate being involved.  I hope I have been helpful.

## 2023-11-17 NOTE — SUBJECTIVE & OBJECTIVE
Interval History: See HPI    Past Medical History:   Diagnosis Date    Atrial fibrillation, controlled     Bullous pemphigoid     CHF (congestive heart failure)     Colon cancer     Diabetes mellitus, type 2     Eczema     Hypertension     Hypothyroidism        Past Surgical History:   Procedure Laterality Date    APPENDECTOMY      BREAST SURGERY      lt breast bx    CHOLECYSTECTOMY      COLON SURGERY      EYE SURGERY         Review of patient's allergies indicates:   Allergen Reactions    Nitrofurantoin monohyd/m-cryst      leg cramps       Medications:  Continuous Infusions:  Scheduled Meds:   mupirocin   Nasal BID    pantoprazole  40 mg Intravenous Daily     PRN Meds:acetaminophen, magnesium oxide, magnesium oxide, ondansetron, potassium bicarbonate, potassium bicarbonate, potassium bicarbonate, potassium, sodium phosphates, potassium, sodium phosphates, potassium, sodium phosphates, prochlorperazine    Family History       Problem Relation (Age of Onset)    Diabetes Mother, Father    Heart disease Mother, Father          Tobacco Use    Smoking status: Never    Smokeless tobacco: Never   Substance and Sexual Activity    Alcohol use: No    Drug use: No    Sexual activity: Never       Review of Systems  Objective:     Vital Signs (Most Recent):  Temp: 97 °F (36.1 °C) (11/17/23 0745)  Pulse: (!) 44 (11/17/23 1645)  Resp: (!) 29 (11/17/23 1645)  BP: (!) 140/99 (11/17/23 1630)  SpO2: (!) 93 % (11/17/23 1645) Vital Signs (24h Range):  Temp:  [97 °F (36.1 °C)-98 °F (36.7 °C)] 97 °F (36.1 °C)  Pulse:  [38-54] 44  Resp:  [20-50] 29  SpO2:  [90 %-98 %] 93 %  BP: (116-189)/(55-99) 140/99     Weight: 46.6 kg (102 lb 11.8 oz)  Body mass index is 17.1 kg/m².       Physical Exam  Vitals reviewed.   Constitutional:       General: She is not in acute distress.     Appearance: She is ill-appearing.   HENT:      Head: Normocephalic and atraumatic.      Right Ear: External ear normal.      Left Ear: External ear normal.      Nose:  Nose normal. No congestion.      Mouth/Throat:      Mouth: Mucous membranes are moist.      Pharynx: No oropharyngeal exudate.   Eyes:      General:         Right eye: No discharge.         Left eye: No discharge.      Extraocular Movements: Extraocular movements intact.   Cardiovascular:      Rate and Rhythm: Bradycardia present.      Pulses: Normal pulses.   Pulmonary:      Effort: Pulmonary effort is normal. No respiratory distress.   Abdominal:      General: There is no distension.      Palpations: Abdomen is soft.      Tenderness: There is no abdominal tenderness.   Skin:     General: Skin is warm.   Neurological:      General: No focal deficit present.      Mental Status: She is alert and oriented to person, place, and time.   Psychiatric:         Mood and Affect: Mood normal.         Behavior: Behavior normal.         Thought Content: Thought content normal.         Judgment: Judgment normal.            Review of Symptoms      Symptom Assessment (ESAS 0-10 Scale)  Pain:  0  Dyspnea:  2  Anxiety:  7  Nausea:  0  Depression:  2  Anorexia:  3  Fatigue:  5  Insomnia:  0  Restlessness:  0  Agitation:  0         Performance Status:  50    Living Arrangements:  Lives in nursing home    Psychosocial/Cultural:   See Palliative Psychosocial Note: No  **Primary  to Follow**  Palliative Care  Consult: No        Advance Care Planning   Advance Directives:   Do Not Resuscitate Status: Yes      Decision Making:  Patient answered questions and Family answered questions  Goals of Care: The patient and family endorses that what is most important right now is to focus on spending time at home, avoiding the hospital, remaining as independent as possible, and symptom/pain control    Accordingly, we have decided that the best plan to meet the patient's goals includes continuing with treatment         Significant Labs: BMP:   Recent Labs   Lab 11/17/23  0424   GLU 87      K 3.8      CO2 28  "  BUN 21   CREATININE 0.5   CALCIUM 9.4   MG 1.9     CBC:   Recent Labs   Lab 11/17/23  0035 11/17/23 0424   WBC 7.02 6.56   HGB 12.1 11.7*   HCT 37.5 36.0*    214     CBC:   Recent Labs   Lab 11/17/23 0424   WBC 6.56   HGB 11.7*   HCT 36.0*   MCV 89        BMP:  Recent Labs   Lab 11/17/23 0424   GLU 87      K 3.8      CO2 28   BUN 21   CREATININE 0.5   CALCIUM 9.4   MG 1.9     LFT:  Lab Results   Component Value Date    AST 14 11/17/2023    ALKPHOS 44 (L) 11/17/2023    BILITOT 0.9 11/17/2023     Albumin:   Albumin   Date Value Ref Range Status   11/17/2023 3.6 3.5 - 5.2 g/dL Final     Protein:   Total Protein   Date Value Ref Range Status   11/17/2023 6.7 6.0 - 8.4 g/dL Final     Lactic acid:   No results found for: "LACTATE"    Significant Imaging: I have reviewed all pertinent imaging results/findings within the past 24 hours.    "

## 2023-11-17 NOTE — SUBJECTIVE & OBJECTIVE
Past Medical History:   Diagnosis Date    Atrial fibrillation, controlled     Bullous pemphigoid     CHF (congestive heart failure)     Colon cancer     Diabetes mellitus, type 2     Eczema     Hypertension     Hypothyroidism        Past Surgical History:   Procedure Laterality Date    APPENDECTOMY      BREAST SURGERY      lt breast bx    CHOLECYSTECTOMY      COLON SURGERY      EYE SURGERY         Review of patient's allergies indicates:   Allergen Reactions    Nitrofurantoin monohyd/m-cryst      leg cramps       No current facility-administered medications on file prior to encounter.     Current Outpatient Medications on File Prior to Encounter   Medication Sig    bisacodyl (DULCOLAX) 5 mg EC tablet Take 5 mg by mouth daily as needed for Constipation.    busPIRone (BUSPAR) 5 MG Tab Take 1 tablet (5 mg total) by mouth 2 (two) times daily as needed (anxiety).    digoxin (LANOXIN) 125 mcg tablet Take 1 tablet (0.125 mg total) by mouth once daily.    fluocinolone acetonide oiL 0.01 % Drop Place 5 drops in ear(s) 2 (two) times daily as needed.    furosemide (LASIX) 20 MG tablet Take 1 tablet (20 mg total) by mouth daily as needed (swelling/edema).     Family History       Problem Relation (Age of Onset)    Diabetes Mother, Father    Heart disease Mother, Father          Tobacco Use    Smoking status: Never    Smokeless tobacco: Never   Substance and Sexual Activity    Alcohol use: No    Drug use: No    Sexual activity: Never     Review of Systems   Constitutional:  Negative for activity change and appetite change.   HENT:  Negative for congestion and dental problem.    Eyes:  Negative for discharge and itching.   Respiratory:  Negative for shortness of breath.    Cardiovascular:  Positive for leg swelling. Negative for chest pain.   Gastrointestinal:  Negative for abdominal distention and abdominal pain.   Endocrine: Negative for cold intolerance.   Genitourinary:  Negative for difficulty urinating and dysuria.    Musculoskeletal:  Negative for arthralgias and back pain.   Skin:  Negative for color change.   Neurological:  Negative for dizziness and facial asymmetry.   Hematological:  Negative for adenopathy.   Psychiatric/Behavioral:  Negative for agitation and behavioral problems.      Objective:     Vital Signs (Most Recent):  Temp: 97.8 °F (36.6 °C) (11/17/23 0024)  Pulse: (!) 42 (11/17/23 0230)  Resp: 20 (11/17/23 0230)  BP: (!) 167/74 (11/17/23 0230)  SpO2: 98 % (11/17/23 0230) Vital Signs (24h Range):  Temp:  [97.8 °F (36.6 °C)] 97.8 °F (36.6 °C)  Pulse:  [38-44] 42  Resp:  [20] 20  SpO2:  [96 %-98 %] 98 %  BP: (167-189)/(74-87) 167/74     Weight: 46.7 kg (103 lb)  Body mass index is 17.14 kg/m².     Physical Exam  Vitals and nursing note reviewed.   Constitutional:       General: She is not in acute distress.  HENT:      Head: Atraumatic.      Right Ear: External ear normal.      Left Ear: External ear normal.      Nose: Nose normal.      Mouth/Throat:      Mouth: Mucous membranes are dry.   Eyes:      Extraocular Movements: Extraocular movements intact.   Cardiovascular:      Rate and Rhythm: Normal rate.   Pulmonary:      Effort: Pulmonary effort is normal.   Musculoskeletal:         General: Normal range of motion.      Cervical back: Normal range of motion.      Right lower leg: Edema present.      Left lower leg: Edema present.   Skin:     General: Skin is warm.   Neurological:      Mental Status: She is alert and oriented to person, place, and time.   Psychiatric:         Behavior: Behavior normal.                Significant Labs: All pertinent labs within the past 24 hours have been reviewed.  CBC:   Recent Labs   Lab 11/17/23 0035   WBC 7.02   HGB 12.1   HCT 37.5        CMP:   Recent Labs   Lab 11/17/23 0035      K 4.0      CO2 30*   GLU 96   BUN 24   CREATININE 0.7   CALCIUM 9.5   PROT 6.9   ALBUMIN 3.7   BILITOT 0.8   ALKPHOS 46*   AST 14   ALT 9*   ANIONGAP 7*       Significant  Imaging: I have reviewed all pertinent imaging results/findings within the past 24 hours.

## 2023-11-18 PROBLEM — I48.0 PAROXYSMAL ATRIAL FIBRILLATION: Status: RESOLVED | Noted: 2019-12-29 | Resolved: 2023-11-18

## 2023-11-18 PROBLEM — E03.8 SUBCLINICAL HYPOTHYROIDISM: Status: ACTIVE | Noted: 2023-11-18

## 2023-11-18 LAB
ALBUMIN SERPL BCP-MCNC: 3.1 G/DL (ref 3.5–5.2)
ALP SERPL-CCNC: 37 U/L (ref 55–135)
ALT SERPL W/O P-5'-P-CCNC: 8 U/L (ref 10–44)
ANION GAP SERPL CALC-SCNC: 7 MMOL/L (ref 8–16)
AST SERPL-CCNC: 13 U/L (ref 10–40)
BILIRUB SERPL-MCNC: 1 MG/DL (ref 0.1–1)
BUN SERPL-MCNC: 19 MG/DL (ref 10–30)
CALCIUM SERPL-MCNC: 8.8 MG/DL (ref 8.7–10.5)
CHLORIDE SERPL-SCNC: 107 MMOL/L (ref 95–110)
CO2 SERPL-SCNC: 27 MMOL/L (ref 23–29)
CREAT SERPL-MCNC: 0.6 MG/DL (ref 0.5–1.4)
ERYTHROCYTE [DISTWIDTH] IN BLOOD BY AUTOMATED COUNT: 14.5 % (ref 11.5–14.5)
EST. GFR  (NO RACE VARIABLE): >60 ML/MIN/1.73 M^2
GLUCOSE SERPL-MCNC: 80 MG/DL (ref 70–110)
HCT VFR BLD AUTO: 36.5 % (ref 37–48.5)
HGB BLD-MCNC: 11.6 G/DL (ref 12–16)
INR PPP: 1.1 (ref 0.8–1.2)
MAGNESIUM SERPL-MCNC: 1.9 MG/DL (ref 1.6–2.6)
MCH RBC QN AUTO: 28.6 PG (ref 27–31)
MCHC RBC AUTO-ENTMCNC: 31.8 G/DL (ref 32–36)
MCV RBC AUTO: 90 FL (ref 82–98)
PLATELET # BLD AUTO: 194 K/UL (ref 150–450)
PMV BLD AUTO: 10.4 FL (ref 9.2–12.9)
POTASSIUM SERPL-SCNC: 4.1 MMOL/L (ref 3.5–5.1)
PROT SERPL-MCNC: 5.7 G/DL (ref 6–8.4)
PROTHROMBIN TIME: 12.2 SEC (ref 9–12.5)
RBC # BLD AUTO: 4.06 M/UL (ref 4–5.4)
SODIUM SERPL-SCNC: 141 MMOL/L (ref 136–145)
WBC # BLD AUTO: 6.48 K/UL (ref 3.9–12.7)

## 2023-11-18 PROCEDURE — 99233 PR SUBSEQUENT HOSPITAL CARE,LEVL III: ICD-10-PCS | Mod: ,,, | Performed by: INTERNAL MEDICINE

## 2023-11-18 PROCEDURE — C9113 INJ PANTOPRAZOLE SODIUM, VIA: HCPCS | Performed by: INTERNAL MEDICINE

## 2023-11-18 PROCEDURE — 63600175 PHARM REV CODE 636 W HCPCS: Performed by: STUDENT IN AN ORGANIZED HEALTH CARE EDUCATION/TRAINING PROGRAM

## 2023-11-18 PROCEDURE — 99233 SBSQ HOSP IP/OBS HIGH 50: CPT | Mod: ,,, | Performed by: INTERNAL MEDICINE

## 2023-11-18 PROCEDURE — 25000003 PHARM REV CODE 250: Performed by: INTERNAL MEDICINE

## 2023-11-18 PROCEDURE — 94761 N-INVAS EAR/PLS OXIMETRY MLT: CPT

## 2023-11-18 PROCEDURE — 21000000 HC CCU ICU ROOM CHARGE

## 2023-11-18 PROCEDURE — 25000003 PHARM REV CODE 250: Performed by: STUDENT IN AN ORGANIZED HEALTH CARE EDUCATION/TRAINING PROGRAM

## 2023-11-18 PROCEDURE — 85027 COMPLETE CBC AUTOMATED: CPT | Performed by: INTERNAL MEDICINE

## 2023-11-18 PROCEDURE — 85610 PROTHROMBIN TIME: CPT | Performed by: INTERNAL MEDICINE

## 2023-11-18 PROCEDURE — 83735 ASSAY OF MAGNESIUM: CPT | Performed by: INTERNAL MEDICINE

## 2023-11-18 PROCEDURE — 80053 COMPREHEN METABOLIC PANEL: CPT | Performed by: INTERNAL MEDICINE

## 2023-11-18 PROCEDURE — 36415 COLL VENOUS BLD VENIPUNCTURE: CPT | Performed by: INTERNAL MEDICINE

## 2023-11-18 PROCEDURE — 63600175 PHARM REV CODE 636 W HCPCS: Performed by: INTERNAL MEDICINE

## 2023-11-18 RX ORDER — ATROPINE SULFATE 0.1 MG/ML
0.5 INJECTION INTRAVENOUS
Status: DISCONTINUED | OUTPATIENT
Start: 2023-11-18 | End: 2023-11-22 | Stop reason: HOSPADM

## 2023-11-18 RX ORDER — ENOXAPARIN SODIUM 100 MG/ML
40 INJECTION SUBCUTANEOUS EVERY 12 HOURS
Status: DISCONTINUED | OUTPATIENT
Start: 2023-11-18 | End: 2023-11-19

## 2023-11-18 RX ADMIN — LEVOTHYROXINE SODIUM 12.5 MCG: 25 TABLET ORAL at 05:11

## 2023-11-18 RX ADMIN — PANTOPRAZOLE SODIUM 40 MG: 40 INJECTION, POWDER, LYOPHILIZED, FOR SOLUTION INTRAVENOUS at 08:11

## 2023-11-18 RX ADMIN — MUPIROCIN 1 G: 20 OINTMENT TOPICAL at 08:11

## 2023-11-18 RX ADMIN — ACETAMINOPHEN 650 MG: 325 TABLET ORAL at 02:11

## 2023-11-18 RX ADMIN — ENOXAPARIN SODIUM 40 MG: 40 INJECTION SUBCUTANEOUS at 05:11

## 2023-11-18 RX ADMIN — ACETAMINOPHEN 650 MG: 325 TABLET ORAL at 06:11

## 2023-11-18 NOTE — PROGRESS NOTES
"On license of [facility]  Adult Nutrition   Progress Note (Initial Assessment)     SUMMARY     Recommendations  Recommendation/Intervention: 1. RD added ONS, Ensure Plus High Protein (to provide 1050 kcal/day and 60 g/day protein)  2. Continue current diet and encourage adequate PO intake.  Goals: 1. Patient to meet at least 75% of estimated energy and protein needs.  Nutrition Goal Status: new  Communication of RD Recs: reviewed with RN    Dietitian Rounds Brief  Pt assessed 2' ICU status. Symptomatic bradycardia. Started on cardiac diet. PO intake of meals is fair ~50%. Fairly well for patient's age. Pt states she likes ensure and boost. RD offerer, ONS and patient accepted.   Plans for pacemaker placement on Monday per progress notes.    LBM: 11/15/23    Reason for Assessment  Reason For Assessment: identified at risk by screening criteria, low BMI (ICU)  Diagnosis: cardiac disease  Relevant Medical History: anxiety; a fib; Sick sinus syndrome;  Symptomatic bradycardia;  Chronic combined systolic and diastolic heart failure;  Subclinical hypothyroidism    Nutrition Risk Screen  Nutrition Risk Screen: no indicators present       Altered Skin Integrity 11/18/23 1520 Left upper Back Blister(s)-Wound Image: Images linked          Nutrition/Diet History  Patient Reported Diet/Restrictions/Preferences: general  Supplemental Drinks or Food Habits: Boost Plus  Food Allergies: NKFA  Factors Affecting Nutritional Intake: other (see comments) (advanced age)    Anthropometrics  Temp: 98 °F (36.7 °C)  Height Method: Stated  Height: 5' 5" (165.1 cm)  Height (inches): 65 in  Weight Method: Bed Scale  Weight: 46.6 kg (102 lb 11.8 oz)  Weight (lb): 102.74 lb  Ideal Body Weight (IBW), Female: 125 lb  % Ideal Body Weight, Female (lb): 82.19 %  BMI (Calculated): 17.1  BMI Grade: 17 - 18.4 protein-energy malnutrition grade I  Weight Change Amount:  (recently gained weight. States she went from 93 lbs to 103.)       Weight " History:  Wt Readings from Last 10 Encounters:   11/17/23 46.6 kg (102 lb 11.8 oz)   11/17/23 46.6 kg (102 lb 11.8 oz)   07/21/23 48.2 kg (106 lb 4.2 oz)   07/17/23 44 kg (97 lb)   01/28/23 49.9 kg (110 lb)   08/08/22 49.9 kg (110 lb)   07/12/22 47.2 kg (104 lb)   06/23/22 47.6 kg (105 lb)   08/11/21 50.8 kg (112 lb)   04/08/21 49.9 kg (110 lb)       Lab/Procedures/Meds: Pertinent Labs Reviewed    Clinical Chemistry:  Recent Labs   Lab 11/17/23 0035 11/17/23 0424 11/18/23 0317    141 141   K 4.0 3.8 4.1    105 107   CO2 30* 28 27   GLU 96 87 80   BUN 24 21 19   CREATININE 0.7 0.5 0.6   CALCIUM 9.5 9.4 8.8   PROT 6.9 6.7 5.7*   ALBUMIN 3.7 3.6 3.1*   BILITOT 0.8 0.9 1.0   ALKPHOS 46* 44* 37*   AST 14 14 13   ALT 9* 9* 8*   ANIONGAP 7* 8 7*   MG 1.9 1.9 1.9       CBC:   Recent Labs   Lab 11/18/23 0317   WBC 6.48   RBC 4.06   HGB 11.6*   HCT 36.5*      MCV 90   MCH 28.6   MCHC 31.8*       Cardiac Profile:  Recent Labs   Lab 11/17/23 0035   *     Thyroid & Parathyroid:  Recent Labs   Lab 11/17/23 0035   TSH 9.143*   FREET4 1.16       Medications: Pertinent Medications reviewed    Scheduled Meds:   enoxparin  40 mg Subcutaneous Q12H (treatment, non-standard time)    levothyroxine  12.5 mcg Oral Before breakfast    mupirocin   Nasal BID    pantoprazole  40 mg Intravenous Daily       PRN Meds:.acetaminophen, magnesium oxide, magnesium oxide, ondansetron, potassium bicarbonate, potassium bicarbonate, potassium bicarbonate, potassium, sodium phosphates, potassium, sodium phosphates, potassium, sodium phosphates, prochlorperazine    Estimated/Assessed Needs  Weight Used For Calorie Calculations: 46.6 kg (102 lb 11.8 oz)  Energy Calorie Requirements (kcal): 1398 - 1631 (30 - 35 kcal/kg)  Energy Need Method: Kcal/kg  Protein Requirements: 70 - 93 (1.5 - 2 g/kg)  Weight Used For Protein Calculations: 46.6 kg (102 lb 11.8 oz)  Fluid Requirements (mL): 1165 (25 ml/kg or per MD )  Estimated Fluid  Requirement Method: other (see comments)  RDA Method (mL): 1398       Nutrition Prescription Ordered  Current Diet Order: Cardiac    Evaluation of Received Nutrient/Fluid Intake  Energy Calories Required: not meeting needs  Protein Required: not meeting needs  Fluid Required: meeting needs  Tolerance: tolerating     Intake/Output Summary (Last 24 hours) at 11/18/2023 1718  Last data filed at 11/18/2023 0516  Gross per 24 hour   Intake 815 ml   Output 330 ml   Net 485 ml      % Intake of Estimated Energy Needs: 50%  % Meal Intake: 25 - 50 %    Nutrition Risk  Level of Risk/Frequency of Follow-up: high     Monitor and Evaluation  Food and Nutrient Intake: food and beverage intake, energy intake  Food and Nutrient Adminstration: diet order  Physical Activity and Function: nutrition-related ADLs and IADLs, factors affecting access to physical activity  Anthropometric Measurements: weight, weight change, body mass index  Biochemical Data, Medical Tests and Procedures: electrolyte and renal panel, inflammatory profile, gastrointestinal profile, lipid profile, glucose/endocrine profile  Nutrition-Focused Physical Findings: overall appearance     Nutrition Follow-Up  RD Follow-up?: Yes    Ara Sandoval RD 11/18/2023 5:19 PM

## 2023-11-18 NOTE — SUBJECTIVE & OBJECTIVE
Interval History: Patient seen and examined. MIKAELA Feels about the same. Heart rates staying mostly in the 40s. Saying she will do the pacemaker on Monday 11/20.       Objective:     Vital Signs (Most Recent):  Temp: 98 °F (36.7 °C) (11/18/23 1101)  Pulse: (!) 43 (11/18/23 1101)  Resp: (!) 25 (11/18/23 1101)  BP: (!) 130/54 (11/18/23 1101)  SpO2: (!) 93 % (11/18/23 1101) Vital Signs (24h Range):  Temp:  [97.6 °F (36.4 °C)-98 °F (36.7 °C)] 98 °F (36.7 °C)  Pulse:  [38-47] 43  Resp:  [18-37] 25  SpO2:  [91 %-98 %] 93 %  BP: (124-161)/(53-99) 130/54     Weight: 46.6 kg (102 lb 11.8 oz)  Body mass index is 17.1 kg/m².    Intake/Output Summary (Last 24 hours) at 11/18/2023 1502  Last data filed at 11/18/2023 0516  Gross per 24 hour   Intake 815 ml   Output 330 ml   Net 485 ml         Physical Exam  Vitals reviewed.   Constitutional:       General: She is not in acute distress.     Comments: Elderly, thin, frail  Poor hearing   HENT:      Head: Normocephalic and atraumatic.   Cardiovascular:      Rate and Rhythm: Regular rhythm. Bradycardia present.   Pulmonary:      Effort: Pulmonary effort is normal. No respiratory distress.   Neurological:      General: No focal deficit present.      Mental Status: She is alert and oriented to person, place, and time. Mental status is at baseline.   Psychiatric:         Mood and Affect: Affect normal.         Behavior: Behavior normal.         Thought Content: Thought content normal.             Significant Labs: All pertinent labs within the past 24 hours have been reviewed.    Significant Imaging: I have reviewed all pertinent imaging results/findings within the past 24 hours.

## 2023-11-18 NOTE — PROGRESS NOTES
[facility]  Department of Cardiology  Progress Note    PATIENT NAME: Jennifer Alarcon  MRN: [id number]  TODAY'S DATE: 11/18/2023  ADMIT DATE: 11/17/2023    SUBJECTIVE     PRINCIPLE PROBLEM: Bradycardia    INTERVAL HISTORY:    11/18/2023  Patient is bradycardic with a heart rate in the 40s and 50s.  She is currently asymptomatic.  Patient is still contemplating about her pacemaker.  At the present time she has not decided as to what she wants to do.        Review of patient's allergies indicates:   Allergen Reactions    Nitrofurantoin monohyd/m-cryst      leg cramps       REVIEW OF SYSTEMS  CARDIOVASCULAR: No recent chest pain, palpitations, arm, neck, or jaw pain  RESPIRATORY: No recent fever, cough chills, SOB or congestion  : No blood in the urine  GI: No Nausea, vomiting, constipation, diarrhea, blood, or reflux.  MUSCULOSKELETAL: No myalgias  NEURO: No lightheadedness or dizziness  EYES: No Double vision, blurry, vision or headache     OBJECTIVE     VITAL SIGNS (Most Recent)  Temp: 98 °F (36.7 °C) (11/18/23 1101)  Pulse: (!) 43 (11/18/23 1101)  Resp: (!) 25 (11/18/23 1101)  BP: (!) 130/54 (11/18/23 1101)  SpO2: (!) 93 % (11/18/23 1101)    VENTILATION STATUS  Resp: (!) 25 (11/18/23 1101)  SpO2: (!) 93 % (11/18/23 1101)           I & O (Last 24H):  Intake/Output Summary (Last 24 hours) at 11/18/2023 1138  Last data filed at 11/18/2023 0516  Gross per 24 hour   Intake 815 ml   Output 330 ml   Net 485 ml       WEIGHTS  Wt Readings from Last 1 Encounters:   11/17/23 0501 46.6 kg (102 lb 11.8 oz)   11/17/23 0022 46.7 kg (103 lb)       PHYSICAL EXAM  CONSTITUTIONAL:  In adequately nourished in no apparent distress  NECK: no carotid bruit, no JVD  LUNGS: CTA  CHEST WALL: no tenderness  HEART: Irregular rate and rhythm, bradycardic with systolic murmur ABDOMEN: soft,   EXTREMITIES: Extremities normal, no edema  NEURO: AAO X 3    SCHEDULED MEDS:   mupirocin   Nasal BID    pantoprazole  40 mg Intravenous Daily  "      CONTINUOUS INFUSIONS:    PRN MEDS:acetaminophen, magnesium oxide, magnesium oxide, ondansetron, potassium bicarbonate, potassium bicarbonate, potassium bicarbonate, potassium, sodium phosphates, potassium, sodium phosphates, potassium, sodium phosphates, prochlorperazine    LABS AND DIAGNOSTICS     CBC LAST 3 DAYS  Recent Labs   Lab 11/17/23 0035 11/17/23 0424 11/18/23 0317   WBC 7.02 6.56 6.48   RBC 4.21 4.05 4.06   HGB 12.1 11.7* 11.6*   HCT 37.5 36.0* 36.5*   MCV 89 89 90   MCH 28.7 28.9 28.6   MCHC 32.3 32.5 31.8*   RDW 14.2 14.1 14.5    214 194   MPV 10.4 10.3 10.4   GRAN 56.6  4.0  --   --    LYMPH 29.6  2.1  --   --    MONO 9.3  0.7  --   --    BASO 0.05  --   --    NRBC 0  --   --        COAGULATION LAST 3 DAYS  Recent Labs   Lab 11/17/23 0424 11/18/23 0317   INR 1.1 1.1       CHEMISTRY LAST 3 DAYS  Recent Labs   Lab 11/17/23 0035 11/17/23 0424 11/18/23 0317    141 141   K 4.0 3.8 4.1    105 107   CO2 30* 28 27   ANIONGAP 7* 8 7*   BUN 24 21 19   CREATININE 0.7 0.5 0.6   GLU 96 87 80   CALCIUM 9.5 9.4 8.8   MG 1.9 1.9 1.9   ALBUMIN 3.7 3.6 3.1*   BILITOT 0.8 0.9 1.0   PROT 6.9 6.7 5.7*   ALKPHOS 46* 44* 37*   ALT 9* 9* 8*   AST 14 14 13       CARDIAC PROFILE LAST 3 DAYS  Recent Labs   Lab 11/17/23 0035 11/17/23  0231   *  --    TROPONINIHS 11.0 9.2       ENDOCRINE LAST 3 DAYS  Recent Labs   Lab 11/17/23 0035   TSH 9.143*       LAST ARTERIAL BLOOD GAS  ABG  No results for input(s): "PH", "PO2", "PCO2", "HCO3", "BE" in the last 168 hours.    LAST 7 DAYS MICROBIOLOGY   Microbiology Results (last 7 days)       ** No results found for the last 168 hours. **            MOST RECENT IMAGING  Echo    Left Ventricle: The left ventricle is normal in size. Normal wall   thickness. Normal wall motion. There is normal systolic function with a   visually estimated ejection fraction of 60 - 65%. There is normal   diastolic function.    Right Ventricle: Normal right ventricular " cavity size. Wall thickness   is normal. Right ventricle wall motion  is normal. Systolic function is   normal.    Left Atrium: Left atrium is moderately dilated.    Mitral Valve: There is mild regurgitation with a centrally directed   jet.    Tricuspid Valve: There is mild to moderate regurgitation.    Pulmonic Valve: There is mild regurgitation with a centrally directed   jet.    Pulmonary Artery: There is moderate pulmonary hypertension. The   estimated pulmonary artery systolic pressure is 56 mmHg.    IVC/SVC: Normal venous pressure at 3 mmHg.    Pericardium: Left pleural effusion measuring 4 cm  X-Ray Chest AP Portable  XR CHEST 1 VIEW    CLINICAL HISTORY:  89 years Female Chest Pain    COMPARISON: July 17, 2023    FINDINGS: Cardiac silhouette size is mildly enlarged. Atherosclerotic calcification of the aorta. Pleural parenchymal opacity at the left lung base consistent with small left pleural effusion. Blunting of right costophrenic angle consistent with trace right pleural fluid. No chely pulmonary edema. No confluent airspace disease. No pneumothorax. Osteopenia.    IMPRESSION:    Small volume left and trace right pleural fluid.    Mild cardiomegaly.    Electronically signed by:  Brannon Hopkins MD  11/17/2023 07:13 AM CST Workstation: [id number]      [employer]  Results for orders placed during the hospital encounter of 11/17/23    Echo    Interpretation Summary    Left Ventricle: The left ventricle is normal in size. Normal wall thickness. Normal wall motion. There is normal systolic function with a visually estimated ejection fraction of 60 - 65%. There is normal diastolic function.    Right Ventricle: Normal right ventricular cavity size. Wall thickness is normal. Right ventricle wall motion  is normal. Systolic function is normal.    Left Atrium: Left atrium is moderately dilated.    Mitral Valve: There is mild regurgitation with a centrally directed jet.    Tricuspid Valve: There is mild to moderate  regurgitation.    Pulmonic Valve: There is mild regurgitation with a centrally directed jet.    Pulmonary Artery: There is moderate pulmonary hypertension. The estimated pulmonary artery systolic pressure is 56 mmHg.    IVC/SVC: Normal venous pressure at 3 mmHg.    Pericardium: Left pleural effusion measuring 4 cm      CURRENT/PREVIOUS VISIT EKG  Results for orders placed or performed during the hospital encounter of 11/17/23   EKG 12-lead    Collection Time: 11/17/23 12:24 AM    Narrative    Test Reason : R07.9,    Vent. Rate : 043 BPM     Atrial Rate : 258 BPM     P-R Int : 000 ms          QRS Dur : 112 ms      QT Int : 496 ms       P-R-T Axes : 000 -77 090 degrees     QTc Int : 419 ms    Atrial fibrillation with slow ventricular response  Left axis deviation  Low voltage QRS  Inferior infarct ,age undetermined  Cannot rule out Anteroseptal infarct ,age undetermined  Abnormal ECG  When compared with ECG of 17-JUL-2023 01:54,  Significant changes have occurred    Referred By: [email or web address]   SELF           Confirmed By:        ASSESSMENT/PLAN:     Active Hospital Problems    Diagnosis    *Bradycardia    Chronic combined systolic and diastolic heart failure    Goals of care, counseling/discussion    Sick sinus syndrome    Walker as ambulation aid    Anxiety    Atrial fibrillation, controlled       ASSESSMENT & PLAN:   1. Hypothyroidism uncorrected.    2. Atrial Fibrillation with slow ventricular response  3. Possible sick sinus syndrome  4. Bradycardia appears to be multifactorial  5. HFrEF         RECOMMENDATIONS:  Patient has significant bradycardia and her hypothyroidism has not been corrected.  Need to start her on thyroxine supplementation as soon as possible.  Consider low-dose IV thyroxine.    Patient has not made any decision in regarding permanent pacemaker implantation.  She would be a candidate for permanent pacemaker implantation when she is euthyroid and still with symptomatic bradycardia.  Will check her  digoxin level.  Patient did not tolerate dopamine gtt due to nausea.  Recommend continued close cardiac monitoring and external pacing pads on patient.   Continue to check and replace potassium and magnesium. Goal for potassium is 4.0, and goal for magnesium is 2.0.    Echo 7/17/23- EF 45% with Grade III diastolic dysfunction. Elevated BNP.  CXR shows small L and R pleural fluid.  Euvolemic during exam.  Will hold off on diuresis.          El Thomas MD  Date of Service: 11/18/2023  11:38 AM

## 2023-11-18 NOTE — HOSPITAL COURSE
89F with PMH HTN, hypothyroid, DM2, and pAfib is admitted with symptomatic bradycardia which is not a new thing for her. She has been recommended pacemaker in the past. Her heart rates were 30s-40s. She did not tolerate the dopamine infusion due to nausea. She did not require atropine. Cardiology consulted and is discussion with her for pacemaker again. Palliative consulted and she desires DNR. She has agreed to pacemaker placement scheduled for 11/20/23.    Cardiology evaluation 11/19/20: recommend increasing thyroid medication dose, monitor HR as it seems to be improving. SHe needs to understand importance of compliancy with medication. Treating underlying condition may resolve bradycardia issue with no need for pacemaker. She seems to understand. Ok to step down to [facility]. DC home when cleared by cardiology wit appropriate HR. HR in 50s.     Assumed care of this patient on 11/21/2023.  Patient current nursing home resident ([place]), history of atrial fibrillation/flutter (on digoxin 125 mcg and Eliquis POA) with prior admission for bradycardia at which time declined PPM who was transferred to the ED due to bradycardia with heart rates in the 30s.  EKG with atrial fibrillation with slow ventricular response, serial high sensitivity troponin negative, , TSH elevated at 9.14 with normal free T4, subclinical hypothyroidism, transthoracic echocardiogram with EF of 60-65% with normal diastolic dysfunction with pulmonary hypertension with PASP 56.  Digoxin level on presentation < 0.3 and held given bradycardia/slow ventricular response.  Home Eliquis held in anticipation potential need for permanent pacemaker and switched to therapeutic dose Lovenox, seen by Cardiology in consultation and initially was planned for pacemaker but on review current recommendations no indication for pacemaker.  Seen by palliative care, DNR code status.  She was monitored closely during her inpatient hospital stay with  cardiology following.  Started on low-dose levothyroxine with recommendations for repeating TFT in few weeks.  Cardiology cleared patient for discharge, digoxin discontinued, resumed home Eliquis, no current indication for pacemaker, follow-up regular outpatient cardiologist.  Discharge plan including medication, follow-up as well as return precautions were reviewed, no objection to discharge.  Discussed with nursing and case management on day discharge.  Communicated with Cardiology on day of discharge.    Discharge examination   Elderly female, lying in bed, on room air

## 2023-11-18 NOTE — PLAN OF CARE
Plan of care reviewed with patient. Safety precautions maintained. Pt remains free from injury. Purewick in place and draining, total output 150 cc. Turned/repositioned as tolerated. Vital signs stable. Afebrile. AAOx4. Call light within reach. Bed in lowest position and locked.    Problem: Adult Inpatient Plan of Care  Goal: Plan of Care Review  Outcome: Ongoing, Progressing  Goal: Patient-Specific Goal (Individualized)  Outcome: Ongoing, Progressing  Goal: Absence of Hospital-Acquired Illness or Injury  Outcome: Ongoing, Progressing  Goal: Optimal Comfort and Wellbeing  Outcome: Ongoing, Progressing  Goal: Readiness for Transition of Care  Outcome: Ongoing, Progressing     Problem: Fall Injury Risk  Goal: Absence of Fall and Fall-Related Injury  Outcome: Ongoing, Progressing     Problem: Skin Injury Risk Increased  Goal: Skin Health and Integrity  Outcome: Ongoing, Progressing     Problem: Coping Ineffective  Goal: Effective Coping  Outcome: Ongoing, Progressing

## 2023-11-18 NOTE — PROGRESS NOTES
[facility] Medicine  Progress Note    Patient Name: Jennifre Alarcon  MRN: [id number]  Patient Class: IP- Inpatient   Admission Date: 11/17/2023  Length of Stay: 1 days  Attending Physician: Jasen Blackman MD  Primary Care Provider: Tyrel Gonzales Jr., MD        Subjective:     Principal Problem:Symptomatic bradycardia        HPI:  89 year old patient getting transferred from Nursing home with HR in the lower 30s   Pt was here in this hospital on July 2023 with bradycardia/SSS  At that point she rejected PPM placement   She was later discharged to [facility]     Past 1 week her HR was in the range of 40s  Yesterday it went down to 30 s and pt was transferred here  When saw in ER pts sister was near bedside  Pt extremely anxious when mentioned about PPM and asking time till Monday before she makes any decision   Denies chest pain /SOB/Dizziness     Overview/Hospital Course:  89F with PMH HTN, hypothyroid, DM2, and pAfib is admitted with symptomatic bradycardia which is not a new thing for her. She has been recommended pacemaker in the past. Her heart rates were 30s-40s. She did not tolerate the dopamine infusion due to nausea. She did not require atropine. Cardiology consulted and is discussion with her for pacemaker again. Palliative consulted and she desires DNR.    Interval History: Patient seen and examined. FINAEON. Feels about the same. Heart rates staying mostly in the 40s. Saying she will do the pacemaker on Monday 11/20.       Objective:     Vital Signs (Most Recent):  Temp: 98 °F (36.7 °C) (11/18/23 1101)  Pulse: (!) 43 (11/18/23 1101)  Resp: (!) 25 (11/18/23 1101)  BP: (!) 130/54 (11/18/23 1101)  SpO2: (!) 93 % (11/18/23 1101) Vital Signs (24h Range):  Temp:  [97.6 °F (36.4 °C)-98 °F (36.7 °C)] 98 °F (36.7 °C)  Pulse:  [38-47] 43  Resp:  [18-37] 25  SpO2:  [91 %-98 %] 93 %  BP: (124-161)/(53-99) 130/54     Weight: 46.6 kg (102 lb 11.8 oz)  Body mass index is 17.1  kg/m².    Intake/Output Summary (Last 24 hours) at 11/18/2023 1502  Last data filed at 11/18/2023 0516  Gross per 24 hour   Intake 815 ml   Output 330 ml   Net 485 ml         Physical Exam  Vitals reviewed.   Constitutional:       General: She is not in acute distress.     Comments: Elderly, thin, frail  Poor hearing   HENT:      Head: Normocephalic and atraumatic.   Cardiovascular:      Rate and Rhythm: Regular rhythm. Bradycardia present.   Pulmonary:      Effort: Pulmonary effort is normal. No respiratory distress.   Neurological:      General: No focal deficit present.      Mental Status: She is alert and oriented to person, place, and time. Mental status is at baseline.   Psychiatric:         Mood and Affect: Affect normal.         Behavior: Behavior normal.         Thought Content: Thought content normal.             Significant Labs: All pertinent labs within the past 24 hours have been reviewed.    Significant Imaging: I have reviewed all pertinent imaging results/findings within the past 24 hours.    Assessment/Plan:      * Symptomatic bradycardia  Rates 30s - 40s  She is DNR and does not want to be externally paced  Did not tolerate dopamine  - dc pacer pads  - tele  - atropine prn for decompensation  - cardiology consult: she is agreeable to pacemaker    Subclinical hypothyroidism       TSH slightly elevated (can be normal at her age), fT4 WNL       - start levothyroxine 12.5mcg daily as there should be little risk of harm with this and possible benefit    Goals of care, counseling/discussion  - see palliative notes  - DNR    Chronic combined systolic and diastolic heart failure  Euvolemic  - hold lasix and use prn    Walker as ambulation aid  Aware     Sick sinus syndrome  As above     Anxiety  Chronic  - monitor clinically; could consider low dose benzo if severe      Atrial fibrillation, controlled  Goes between afib and sinus patito  - therapeutic lovenox      VTE Risk Mitigation (From admission,  onward)           Ordered     enoxaparin injection 40 mg  Every 12 hours         11/18/23 1502     IP VTE HIGH RISK PATIENT  Once         11/17/23 0306     Place sequential compression device  Until discontinued         11/17/23 0306                    Discharge Planning   JASMYNE: 11/22/2023     Code Status: DNR   Is the patient medically ready for discharge?:     Reason for patient still in hospital (select all that apply): Patient trending condition and Consult recommendations, pacemaker  Discharge Plan A: Return to nursing home                Jasen Blackman MD  Department of Hospital Medicine   [facility]

## 2023-11-18 NOTE — ASSESSMENT & PLAN NOTE
Rates 30s - 40s  She is DNR and does not want to be externally paced  Did not tolerate dopamine  - dc pacer pads  - tele  - atropine prn for decompensation  - cardiology consult: she is agreeable to pacemaker

## 2023-11-18 NOTE — PLAN OF CARE
Problem: Oral Intake Inadequate  Goal: Improved Oral Intake  Outcome: Ongoing, Progressing  Intervention: Promote and Optimize Oral Intake  Flowsheets (Taken 11/18/2023 1719)  Oral Nutrition Promotion: calorie-dense liquids provided

## 2023-11-19 LAB
ALBUMIN SERPL BCP-MCNC: 3.1 G/DL (ref 3.5–5.2)
ALP SERPL-CCNC: 38 U/L (ref 55–135)
ALT SERPL W/O P-5'-P-CCNC: 8 U/L (ref 10–44)
ANION GAP SERPL CALC-SCNC: 5 MMOL/L (ref 8–16)
AST SERPL-CCNC: 12 U/L (ref 10–40)
BILIRUB SERPL-MCNC: 1.3 MG/DL (ref 0.1–1)
BUN SERPL-MCNC: 16 MG/DL (ref 10–30)
CALCIUM SERPL-MCNC: 8.9 MG/DL (ref 8.7–10.5)
CHLORIDE SERPL-SCNC: 106 MMOL/L (ref 95–110)
CO2 SERPL-SCNC: 28 MMOL/L (ref 23–29)
CREAT SERPL-MCNC: 0.6 MG/DL (ref 0.5–1.4)
ERYTHROCYTE [DISTWIDTH] IN BLOOD BY AUTOMATED COUNT: 14.2 % (ref 11.5–14.5)
EST. GFR  (NO RACE VARIABLE): >60 ML/MIN/1.73 M^2
GLUCOSE SERPL-MCNC: 88 MG/DL (ref 70–110)
HCT VFR BLD AUTO: 37.8 % (ref 37–48.5)
HGB BLD-MCNC: 12.2 G/DL (ref 12–16)
INR PPP: 1.1 (ref 0.8–1.2)
MAGNESIUM SERPL-MCNC: 1.8 MG/DL (ref 1.6–2.6)
MCH RBC QN AUTO: 28.6 PG (ref 27–31)
MCHC RBC AUTO-ENTMCNC: 32.3 G/DL (ref 32–36)
MCV RBC AUTO: 89 FL (ref 82–98)
PLATELET # BLD AUTO: 209 K/UL (ref 150–450)
PMV BLD AUTO: 10.7 FL (ref 9.2–12.9)
POTASSIUM SERPL-SCNC: 3.9 MMOL/L (ref 3.5–5.1)
PROT SERPL-MCNC: 6 G/DL (ref 6–8.4)
PROTHROMBIN TIME: 12.7 SEC (ref 9–12.5)
RBC # BLD AUTO: 4.26 M/UL (ref 4–5.4)
SODIUM SERPL-SCNC: 139 MMOL/L (ref 136–145)
WBC # BLD AUTO: 11 K/UL (ref 3.9–12.7)

## 2023-11-19 PROCEDURE — 94799 UNLISTED PULMONARY SVC/PX: CPT

## 2023-11-19 PROCEDURE — 25000003 PHARM REV CODE 250: Performed by: STUDENT IN AN ORGANIZED HEALTH CARE EDUCATION/TRAINING PROGRAM

## 2023-11-19 PROCEDURE — 63600175 PHARM REV CODE 636 W HCPCS: Performed by: HOSPITALIST

## 2023-11-19 PROCEDURE — 83735 ASSAY OF MAGNESIUM: CPT | Performed by: INTERNAL MEDICINE

## 2023-11-19 PROCEDURE — 80053 COMPREHEN METABOLIC PANEL: CPT | Performed by: INTERNAL MEDICINE

## 2023-11-19 PROCEDURE — 94761 N-INVAS EAR/PLS OXIMETRY MLT: CPT

## 2023-11-19 PROCEDURE — 85027 COMPLETE CBC AUTOMATED: CPT | Performed by: INTERNAL MEDICINE

## 2023-11-19 PROCEDURE — 99232 PR SUBSEQUENT HOSPITAL CARE,LEVL II: ICD-10-PCS | Mod: ,,, | Performed by: INTERNAL MEDICINE

## 2023-11-19 PROCEDURE — 99900035 HC TECH TIME PER 15 MIN (STAT)

## 2023-11-19 PROCEDURE — 25000003 PHARM REV CODE 250: Performed by: HOSPITALIST

## 2023-11-19 PROCEDURE — 85610 PROTHROMBIN TIME: CPT | Performed by: INTERNAL MEDICINE

## 2023-11-19 PROCEDURE — 99900031 HC PATIENT EDUCATION (STAT)

## 2023-11-19 PROCEDURE — 63600175 PHARM REV CODE 636 W HCPCS: Performed by: STUDENT IN AN ORGANIZED HEALTH CARE EDUCATION/TRAINING PROGRAM

## 2023-11-19 PROCEDURE — C9113 INJ PANTOPRAZOLE SODIUM, VIA: HCPCS | Performed by: INTERNAL MEDICINE

## 2023-11-19 PROCEDURE — 63600175 PHARM REV CODE 636 W HCPCS: Performed by: INTERNAL MEDICINE

## 2023-11-19 PROCEDURE — 21000000 HC CCU ICU ROOM CHARGE

## 2023-11-19 PROCEDURE — 36415 COLL VENOUS BLD VENIPUNCTURE: CPT | Performed by: INTERNAL MEDICINE

## 2023-11-19 PROCEDURE — 99232 SBSQ HOSP IP/OBS MODERATE 35: CPT | Mod: ,,, | Performed by: INTERNAL MEDICINE

## 2023-11-19 PROCEDURE — 25000003 PHARM REV CODE 250: Performed by: INTERNAL MEDICINE

## 2023-11-19 RX ORDER — LEVOTHYROXINE SODIUM 25 UG/1
25 TABLET ORAL
Status: DISCONTINUED | OUTPATIENT
Start: 2023-11-20 | End: 2023-11-22 | Stop reason: HOSPADM

## 2023-11-19 RX ORDER — ENOXAPARIN SODIUM 100 MG/ML
50 INJECTION SUBCUTANEOUS EVERY 12 HOURS
Status: DISCONTINUED | OUTPATIENT
Start: 2023-11-19 | End: 2023-11-22

## 2023-11-19 RX ORDER — POLYETHYLENE GLYCOL 3350 17 G/17G
17 POWDER, FOR SOLUTION ORAL DAILY
Status: DISCONTINUED | OUTPATIENT
Start: 2023-11-19 | End: 2023-11-22

## 2023-11-19 RX ADMIN — PANTOPRAZOLE SODIUM 40 MG: 40 INJECTION, POWDER, LYOPHILIZED, FOR SOLUTION INTRAVENOUS at 09:11

## 2023-11-19 RX ADMIN — MUPIROCIN 1 G: 20 OINTMENT TOPICAL at 09:11

## 2023-11-19 RX ADMIN — LEVOTHYROXINE SODIUM 12.5 MCG: 25 TABLET ORAL at 05:11

## 2023-11-19 RX ADMIN — ENOXAPARIN SODIUM 50 MG: 60 INJECTION SUBCUTANEOUS at 05:11

## 2023-11-19 RX ADMIN — POLYETHYLENE GLYCOL 3350 17 G: 17 POWDER, FOR SOLUTION ORAL at 12:11

## 2023-11-19 RX ADMIN — ENOXAPARIN SODIUM 40 MG: 40 INJECTION SUBCUTANEOUS at 05:11

## 2023-11-19 RX ADMIN — MUPIROCIN 1 G: 20 OINTMENT TOPICAL at 08:11

## 2023-11-19 NOTE — CARE UPDATE
11/19/23 0748   Patient Assessment/Suction   Level of Consciousness (AVPU) alert   PRE-TX-O2   Device (Oxygen Therapy) room air   Pulse Oximetry Type Continuous   $ Pulse Oximetry - Multiple Charge Pulse Oximetry - Multiple   Education   $ Education Other (see comment);15 min

## 2023-11-19 NOTE — PROGRESS NOTES
Pharmacist Renal Dose Adjustment Note    Jennifer Alarcon is a 89 y.o. female being treated with the medication Enoxaparin.    Patient Data:    Vital Signs (Most Recent):  Temp: 98 °F (36.7 °C) (11/19/23 1100)  Pulse: (!) 50 (11/19/23 1100)  Resp: 20 (11/19/23 1100)  BP: (!) 146/66 (11/19/23 1100)  SpO2: (!) 93 % (11/19/23 1100) Vital Signs (72h Range):  Temp:  [97 °F (36.1 °C)-98.2 °F (36.8 °C)]   Pulse:  [38-54]   Resp:  [16-50]   BP: (116-189)/(53-99)   SpO2:  [68 %-100 %]      Recent Labs   Lab 11/17/23  0424 11/18/23  0317 11/19/23  0458   CREATININE 0.5 0.6 0.6     Serum creatinine: 0.6 mg/dL 11/19/23 0458  Estimated creatinine clearance: 44.9 mL/min    Enoxaparin 40 mg subq every 12 hours will be changed to Enoxaparin 50 mg subq every 12 hours due to weight increase.    Pharmacist's Name: Prema Borges  Pharmacist's Extension: [phone]

## 2023-11-19 NOTE — ASSESSMENT & PLAN NOTE
Rates 30s - 40s  She is DNR and does not want to be externally paced  Did not tolerate dopamine  - dc pacer pads  - tele  - atropine prn for decompensation  - cardiology consult: she is agreeable to pacemaker  -pacemaker plan discontinued  -HR improved to 50s.  - treating underlying hypothyroid

## 2023-11-19 NOTE — PROGRESS NOTES
[facility] Medicine  Progress Note    Patient Name: Jennifer Alarcon  MRN: [id number]  Patient Class: IP- Inpatient   Admission Date: 11/17/2023  Length of Stay: 2 days  Attending Physician: Maggi Rangel MD  Primary Care Provider: Tyrel Gonzales Jr., MD        Subjective:     Principal Problem:Symptomatic bradycardia        HPI:  89 year old patient getting transferred from Nursing home with HR in the lower 30s   Pt was here in this hospital on July 2023 with bradycardia/SSS  At that point she rejected PPM placement   She was later discharged to [facility]     Past 1 week her HR was in the range of 40s  Yesterday it went down to 30 s and pt was transferred here  When saw in ER pts sister was near bedside  Pt extremely anxious when mentioned about PPM and asking time till Monday before she makes any decision   Denies chest pain /SOB/Dizziness     Overview/Hospital Course:  89F with PMH HTN, hypothyroid, DM2, and pAfib is admitted with symptomatic bradycardia which is not a new thing for her. She has been recommended pacemaker in the past. Her heart rates were 30s-40s. She did not tolerate the dopamine infusion due to nausea. She did not require atropine. Cardiology consulted and is discussion with her for pacemaker again. Palliative consulted and she desires DNR. She has agreed to pacemaker placement scheduled for 11/20/23.    Cardiology evaluation 11/19/20: recommend increasing thyroid medication dose, monitor HR as it seems to be improving. SHe needs to understand importance of compliancy with medication. Treating underlying condition may resolve bradycardia issue with no need for pacemaker. She seems to understand. Ok to step down to [id number]. DC home when cleared by cardiology wit appropriate HR.     Interval History: HR improved to 50s. Pacemaker placement plan discontinued. Denies CP.       Objective:     Vital Signs (Most Recent):  Temp: 98 °F (36.7 °C) (11/19/23 1100)  Pulse:  (!) 51 (11/19/23 1300)  Resp: (!) 31 (11/19/23 1300)  BP: 115/73 (11/19/23 1300)  SpO2: (!) 89 % (11/19/23 1300) Vital Signs (24h Range):  Temp:  [97.2 °F (36.2 °C)-98.2 °F (36.8 °C)] 98 °F (36.7 °C)  Pulse:  [42-51] 51  Resp:  [18-41] 31  SpO2:  [68 %-100 %] 89 %  BP: (115-168)/(53-98) 115/73     Weight: 46.6 kg (102 lb 11.8 oz)  Body mass index is 17.1 kg/m².    Intake/Output Summary (Last 24 hours) at 11/19/2023 1407  Last data filed at 11/19/2023 0810  Gross per 24 hour   Intake 905 ml   Output 125 ml   Net 780 ml           Physical Exam  Vitals reviewed.   Constitutional:       General: She is not in acute distress.     Comments: Elderly, thin, frail  Poor hearing   HENT:      Head: Normocephalic and atraumatic.   Cardiovascular:      Rate and Rhythm: Regular rhythm. Bradycardia present.   Pulmonary:      Effort: Pulmonary effort is normal. No respiratory distress.   Neurological:      General: No focal deficit present.      Mental Status: She is alert and oriented to person, place, and time. Mental status is at baseline.   Psychiatric:         Mood and Affect: Affect normal.         Behavior: Behavior normal.         Thought Content: Thought content normal.             Significant Labs: All pertinent labs within the past 24 hours have been reviewed.    Significant Imaging: I have reviewed all pertinent imaging results/findings within the past 24 hours.  Review of Systems    Assessment/Plan:      * Symptomatic bradycardia  Rates 30s - 40s  She is DNR and does not want to be externally paced  Did not tolerate dopamine  - dc pacer pads  - tele  - atropine prn for decompensation  - cardiology consult: she is agreeable to pacemaker  -pacemaker plan discontinued  -HR improved to 50s.  - treating underlying hypothyroid     Subclinical hypothyroidism  Elevated TSH, FT4 normal   Started on levothyroxine 12.5mcg  Increase to 25mcg daily   Will need repeat levels in 4 weeks     Goals of care, counseling/discussion  - see  palliative notes  - DNR    Chronic combined systolic and diastolic heart failure  Euvolemic  - hold lasix and use prn    Walker as ambulation aid  Aware     Sick sinus syndrome  As above     Anxiety  Chronic  - monitor clinically; could consider low dose benzo if severe      Atrial fibrillation, controlled  Goes between afib and sinus patito  - therapeutic lovenox      VTE Risk Mitigation (From admission, onward)           Ordered     enoxaparin injection 50 mg  Every 12 hours         11/19/23 1225     IP VTE HIGH RISK PATIENT  Once         11/17/23 0306     Place sequential compression device  Until discontinued         11/17/23 0306                    Discharge Planning   JASMYNE: 11/22/2023     Code Status: DNR   Is the patient medically ready for discharge?:     Reason for patient still in hospital (select all that apply): Patient trending condition, Treatment, and Consult recommendations  Discharge Plan A: Return to nursing home                  Maggi Rangel MD  Department of Hospital Medicine   [facility]

## 2023-11-19 NOTE — PROGRESS NOTES
[facility]  Department of Cardiology  Progress Note    PATIENT NAME: Jennifer Alarcon  MRN: [id number]  TODAY'S DATE: 11/19/2023  ADMIT DATE: 11/17/2023    SUBJECTIVE     PRINCIPLE PROBLEM: Symptomatic bradycardia  Maggi Rangel MD  Physician [facility] Medicine  89 year old patient getting transferred from Nursing home with HR in the lower 30s   Pt was here in this hospital on July 2023 with bradycardia/SSS  At that point she rejected PPM placement   She was later discharged to [facility]      Past 1 week her HR was in the range of 40s  Yesterday it went down to 30 s and pt was transferred here  When saw in ER pts sister was near bedside  Pt extremely anxious when mentioned about PPM and asking time till Monday before she makes any decision   Denies chest pain /SOB/Dizziness      Overview/Hospital Course:  89F with PMH HTN, hypothyroid, DM2, and pAfib is admitted with symptomatic bradycardia which is not a new thing for her. She has been recommended pacemaker in the past. Her heart rates were 30s-40s. She did not tolerate the dopamine infusion due to nausea. She did not require atropine. Cardiology consulted and is discussion with her for pacemaker again. Palliative consulted and she desires DNR. She has agreed to pacemaker placement scheduled for 11/20/23.     Cardiology evaluation 11/19/20: recommend increasing thyroid medication dose, monitor HR as it seems to be improving. SHe needs to understand importance of compliancy with medication. Treating underlying condition may resolve bradycardia issue with no need for pacemaker. She seems to understand. Ok to step down to [employer]. DC home when cleared by cardiology wit appropriate HR.     INTERVAL HISTORY:    11/19/2023  At the present time patient's heart rate is about 53 beats per minute.  She is awake alert denies any dizziness.  Discussed with patient at length and in detail along with the family member in regards with permanent  pacemaker.  Patient was significantly hypothyroid and replacing thyroxine would be the most appropriate step to get her heart rate up and would also ramp up her thyroxine requirements for the body.  Patient is very anxious about undergoing pacemaker implantation.    Review of patient's allergies indicates:   Allergen Reactions    Nitrofurantoin monohyd/m-cryst      leg cramps       REVIEW OF SYSTEMS  CARDIOVASCULAR: No recent chest pain, palpitations, arm, neck, or jaw pain  RESPIRATORY: No recent fever, cough chills, SOB or congestion  : No blood in the urine  GI: No Nausea, vomiting, constipation, diarrhea, blood, or reflux.  MUSCULOSKELETAL: No myalgias  NEURO: No lightheadedness or dizziness  EYES: No Double vision, blurry, vision or headache     OBJECTIVE     VITAL SIGNS (Most Recent)  Temp: 98.1 °F (36.7 °C) (11/19/23 1510)  Pulse: (!) 48 (11/19/23 1510)  Resp: 20 (11/19/23 1510)  BP: (!) 154/67 (11/19/23 1510)  SpO2: 98 % (11/19/23 1510)    VENTILATION STATUS  Resp: 20 (11/19/23 1510)  SpO2: 98 % (11/19/23 1510)           I & O (Last 24H):  Intake/Output Summary (Last 24 hours) at 11/19/2023 1605  Last data filed at 11/19/2023 0810  Gross per 24 hour   Intake 905 ml   Output 125 ml   Net 780 ml       WEIGHTS  Wt Readings from Last 1 Encounters:   11/17/23 0501 46.6 kg (102 lb 11.8 oz)   11/17/23 0022 46.7 kg (103 lb)       PHYSICAL EXAM  CONSTITUTIONAL:  Elderly and appears malnourished no apparent distress  NECK: no carotid bruit, no JVD  LUNGS: CTA decreased at the bases.  CHEST WALL: no tenderness  HEART:  Bradycardia with a heart rate about 53.  Systolic murmur audible.   ABDOMEN: soft,   EXTREMITIES: Extremities normal, no edema  NEURO: AAO X 3 no gross focal deficits.    SCHEDULED MEDS:   enoxparin  50 mg Subcutaneous Q12H (treatment, non-standard time)    [START ON 11/20/2023] levothyroxine  25 mcg Oral Before breakfast    mupirocin   Nasal BID    pantoprazole  40 mg Intravenous Daily    polyethylene  "glycol  17 g Oral Daily       CONTINUOUS INFUSIONS:    PRN MEDS:acetaminophen, atropine, magnesium oxide, magnesium oxide, ondansetron, potassium bicarbonate, potassium bicarbonate, potassium bicarbonate, potassium, sodium phosphates, potassium, sodium phosphates, potassium, sodium phosphates, prochlorperazine    LABS AND DIAGNOSTICS     CBC LAST 3 DAYS  Recent Labs   Lab 11/17/23 0035 11/17/23 0424 11/18/23 0317 11/19/23 0458   WBC 7.02 6.56 6.48 11.00   RBC 4.21 4.05 4.06 4.26   HGB 12.1 11.7* 11.6* 12.2   HCT 37.5 36.0* 36.5* 37.8   MCV 89 89 90 89   MCH 28.7 28.9 28.6 28.6   MCHC 32.3 32.5 31.8* 32.3   RDW 14.2 14.1 14.5 14.2    214 194 209   MPV 10.4 10.3 10.4 10.7   GRAN 56.6  4.0  --   --   --    LYMPH 29.6  2.1  --   --   --    MONO 9.3  0.7  --   --   --    BASO 0.05  --   --   --    NRBC 0  --   --   --        COAGULATION LAST 3 DAYS  Recent Labs   Lab 11/17/23 0424 11/18/23 0317 11/19/23 0458   INR 1.1 1.1 1.1       CHEMISTRY LAST 3 DAYS  Recent Labs   Lab 11/17/23 0424 11/18/23 0317 11/19/23 0458    141 139   K 3.8 4.1 3.9    107 106   CO2 28 27 28   ANIONGAP 8 7* 5*   BUN 21 19 16   CREATININE 0.5 0.6 0.6   GLU 87 80 88   CALCIUM 9.4 8.8 8.9   MG 1.9 1.9 1.8   ALBUMIN 3.6 3.1* 3.1*   BILITOT 0.9 1.0 1.3*   PROT 6.7 5.7* 6.0   ALKPHOS 44* 37* 38*   ALT 9* 8* 8*   AST 14 13 12       CARDIAC PROFILE LAST 3 DAYS  Recent Labs   Lab 11/17/23 0035 11/17/23  0231   *  --    TROPONINIHS 11.0 9.2       ENDOCRINE LAST 3 DAYS  Recent Labs   Lab 11/17/23  0035   TSH 9.143*       LAST ARTERIAL BLOOD GAS  ABG  No results for input(s): "PH", "PO2", "PCO2", "HCO3", "BE" in the last 168 hours.    LAST 7 DAYS MICROBIOLOGY   Microbiology Results (last 7 days)       ** No results found for the last 168 hours. **            MOST RECENT IMAGING  Echo    Left Ventricle: The left ventricle is normal in size. Normal wall   thickness. Normal wall motion. There is normal systolic function " with a   visually estimated ejection fraction of 60 - 65%. There is normal   diastolic function.    Right Ventricle: Normal right ventricular cavity size. Wall thickness   is normal. Right ventricle wall motion  is normal. Systolic function is   normal.    Left Atrium: Left atrium is moderately dilated.    Mitral Valve: There is mild regurgitation with a centrally directed   jet.    Tricuspid Valve: There is mild to moderate regurgitation.    Pulmonic Valve: There is mild regurgitation with a centrally directed   jet.    Pulmonary Artery: There is moderate pulmonary hypertension. The   estimated pulmonary artery systolic pressure is 56 mmHg.    IVC/SVC: Normal venous pressure at 3 mmHg.    Pericardium: Left pleural effusion measuring 4 cm  X-Ray Chest AP Portable  XR CHEST 1 VIEW    CLINICAL HISTORY:  89 years Female Chest Pain    COMPARISON: July 17, 2023    FINDINGS: Cardiac silhouette size is mildly enlarged. Atherosclerotic calcification of the aorta. Pleural parenchymal opacity at the left lung base consistent with small left pleural effusion. Blunting of right costophrenic angle consistent with trace right pleural fluid. No chely pulmonary edema. No confluent airspace disease. No pneumothorax. Osteopenia.    IMPRESSION:    Small volume left and trace right pleural fluid.    Mild cardiomegaly.    Electronically signed by:  Brannon Hopkins MD  11/17/2023 07:13 AM CST Workstation: [id number]      [employer]  Results for orders placed during the hospital encounter of 11/17/23    Echo    Interpretation Summary    Left Ventricle: The left ventricle is normal in size. Normal wall thickness. Normal wall motion. There is normal systolic function with a visually estimated ejection fraction of 60 - 65%. There is normal diastolic function.    Right Ventricle: Normal right ventricular cavity size. Wall thickness is normal. Right ventricle wall motion  is normal. Systolic function is normal.    Left Atrium: Left atrium is  moderately dilated.    Mitral Valve: There is mild regurgitation with a centrally directed jet.    Tricuspid Valve: There is mild to moderate regurgitation.    Pulmonic Valve: There is mild regurgitation with a centrally directed jet.    Pulmonary Artery: There is moderate pulmonary hypertension. The estimated pulmonary artery systolic pressure is 56 mmHg.    IVC/SVC: Normal venous pressure at 3 mmHg.    Pericardium: Left pleural effusion measuring 4 cm      CURRENT/PREVIOUS VISIT EKG  Results for orders placed or performed during the hospital encounter of 11/17/23   EKG 12-lead    Collection Time: 11/17/23 12:24 AM    Narrative    Test Reason : R07.9,    Vent. Rate : 043 BPM     Atrial Rate : 258 BPM     P-R Int : 000 ms          QRS Dur : 112 ms      QT Int : 496 ms       P-R-T Axes : 000 -77 090 degrees     QTc Int : 419 ms    Atrial fibrillation with slow ventricular response  Left axis deviation  Low voltage QRS  Inferior infarct ,age undetermined  Cannot rule out Anteroseptal infarct ,age undetermined  Abnormal ECG  When compared with ECG of 17-JUL-2023 01:54,  Significant changes have occurred    Referred By: [email or web address]   SELF           Confirmed By:        ASSESSMENT/PLAN:     Active Hospital Problems    Diagnosis    *Symptomatic bradycardia    Subclinical hypothyroidism    Chronic combined systolic and diastolic heart failure    Goals of care, counseling/discussion    Sick sinus syndrome    Walker as ambulation aid    Anxiety    Atrial fibrillation, controlled       ASSESSMENT & PLAN:   1. Hypothyroidism uncorrected.    2. Atrial Fibrillation with slow ventricular response  3. Possible sick sinus syndrome  4. Bradycardia appears to be multifactorial  5. HFrEF       RECOMMENDATIONS:  1. Discussed with patient and her family member at length and in detail in regards with hypothyroidism and that she would require to be on thyroxine supplementation.  2. Patient has not made any decision in regards with permanent  pacemaker implantation.  She is still very anxious about it.    3. If her heart rate starts to rise with supplementation of thyroxine then we can continue medical management.  4. Patient has mild LV dysfunction with grade 3 diastolic dysfunction.    Chest x-ray shows small pleural effusion.          El Thomas MD  Date of Service: 11/19/2023  4:05 PM

## 2023-11-19 NOTE — NURSING
Nurses Note -- 4 Eyes      11/18/2023   11:47 PM      Skin assessed during: Transfer      [] No Altered Skin Integrity Present    []Prevention Measures Documented      [x] Yes- Altered Skin Integrity Present or Discovered   [] LDA Added if Not in Epic (Describe Wound)   [] New Altered Skin Integrity was Present on Admit and Documented in LDA   [x] Wound Image Taken  Wound already documented in LDA    Wound Care Consulted? Yes    Attending Nurse:  Cee     Second RN/Staff Member:  millie Lees RN

## 2023-11-19 NOTE — SUBJECTIVE & OBJECTIVE
Interval History: HR improved to 50s. Pacemaker placement plan discontinued. Denies CP.       Objective:     Vital Signs (Most Recent):  Temp: 98 °F (36.7 °C) (11/19/23 1100)  Pulse: (!) 51 (11/19/23 1300)  Resp: (!) 31 (11/19/23 1300)  BP: 115/73 (11/19/23 1300)  SpO2: (!) 89 % (11/19/23 1300) Vital Signs (24h Range):  Temp:  [97.2 °F (36.2 °C)-98.2 °F (36.8 °C)] 98 °F (36.7 °C)  Pulse:  [42-51] 51  Resp:  [18-41] 31  SpO2:  [68 %-100 %] 89 %  BP: (115-168)/(53-98) 115/73     Weight: 46.6 kg (102 lb 11.8 oz)  Body mass index is 17.1 kg/m².    Intake/Output Summary (Last 24 hours) at 11/19/2023 1407  Last data filed at 11/19/2023 0810  Gross per 24 hour   Intake 905 ml   Output 125 ml   Net 780 ml           Physical Exam  Vitals reviewed.   Constitutional:       General: She is not in acute distress.     Comments: Elderly, thin, frail  Poor hearing   HENT:      Head: Normocephalic and atraumatic.   Cardiovascular:      Rate and Rhythm: Regular rhythm. Bradycardia present.   Pulmonary:      Effort: Pulmonary effort is normal. No respiratory distress.   Neurological:      General: No focal deficit present.      Mental Status: She is alert and oriented to person, place, and time. Mental status is at baseline.   Psychiatric:         Mood and Affect: Affect normal.         Behavior: Behavior normal.         Thought Content: Thought content normal.             Significant Labs: All pertinent labs within the past 24 hours have been reviewed.    Significant Imaging: I have reviewed all pertinent imaging results/findings within the past 24 hours.  Review of Systems

## 2023-11-19 NOTE — ASSESSMENT & PLAN NOTE
Elevated TSH, FT4 normal   Started on levothyroxine 12.5mcg  Increase to 25mcg daily   Will need repeat levels in 4 weeks

## 2023-11-19 NOTE — PLAN OF CARE
Problem: Adult Inpatient Plan of Care  Goal: Plan of Care Review  Outcome: Ongoing, Progressing  Goal: Patient-Specific Goal (Individualized)  Outcome: Ongoing, Progressing  Goal: Absence of Hospital-Acquired Illness or Injury  Outcome: Ongoing, Progressing  Goal: Optimal Comfort and Wellbeing  Outcome: Ongoing, Progressing  Goal: Readiness for Transition of Care  Outcome: Ongoing, Progressing     Problem: Diabetes Comorbidity  Goal: Blood Glucose Level Within Targeted Range  Outcome: Ongoing, Progressing     Problem: Fall Injury Risk  Goal: Absence of Fall and Fall-Related Injury  Outcome: Ongoing, Progressing     Problem: Skin Injury Risk Increased  Goal: Skin Health and Integrity  Outcome: Ongoing, Progressing     Problem: Coping Ineffective  Goal: Effective Coping  Outcome: Ongoing, Progressing     Problem: Impaired Wound Healing  Goal: Optimal Wound Healing  Outcome: Ongoing, Progressing     Problem: Oral Intake Inadequate  Goal: Improved Oral Intake  Outcome: Ongoing, Progressing

## 2023-11-20 LAB
ALBUMIN SERPL BCP-MCNC: 3 G/DL (ref 3.5–5.2)
ALP SERPL-CCNC: 40 U/L (ref 55–135)
ALT SERPL W/O P-5'-P-CCNC: 7 U/L (ref 10–44)
ANION GAP SERPL CALC-SCNC: 3 MMOL/L (ref 8–16)
AST SERPL-CCNC: 11 U/L (ref 10–40)
BILIRUB SERPL-MCNC: 0.9 MG/DL (ref 0.1–1)
BUN SERPL-MCNC: 14 MG/DL (ref 10–30)
CALCIUM SERPL-MCNC: 8.8 MG/DL (ref 8.7–10.5)
CHLORIDE SERPL-SCNC: 106 MMOL/L (ref 95–110)
CO2 SERPL-SCNC: 31 MMOL/L (ref 23–29)
CREAT SERPL-MCNC: 0.6 MG/DL (ref 0.5–1.4)
ERYTHROCYTE [DISTWIDTH] IN BLOOD BY AUTOMATED COUNT: 14.5 % (ref 11.5–14.5)
EST. GFR  (NO RACE VARIABLE): >60 ML/MIN/1.73 M^2
GLUCOSE SERPL-MCNC: 78 MG/DL (ref 70–110)
HCT VFR BLD AUTO: 37.6 % (ref 37–48.5)
HGB BLD-MCNC: 12 G/DL (ref 12–16)
MAGNESIUM SERPL-MCNC: 1.8 MG/DL (ref 1.6–2.6)
MCH RBC QN AUTO: 28.2 PG (ref 27–31)
MCHC RBC AUTO-ENTMCNC: 31.9 G/DL (ref 32–36)
MCV RBC AUTO: 89 FL (ref 82–98)
PLATELET # BLD AUTO: 199 K/UL (ref 150–450)
PMV BLD AUTO: 10.6 FL (ref 9.2–12.9)
POTASSIUM SERPL-SCNC: 4.3 MMOL/L (ref 3.5–5.1)
PROT SERPL-MCNC: 5.8 G/DL (ref 6–8.4)
RBC # BLD AUTO: 4.25 M/UL (ref 4–5.4)
SODIUM SERPL-SCNC: 140 MMOL/L (ref 136–145)
WBC # BLD AUTO: 5.46 K/UL (ref 3.9–12.7)

## 2023-11-20 PROCEDURE — 80053 COMPREHEN METABOLIC PANEL: CPT | Performed by: INTERNAL MEDICINE

## 2023-11-20 PROCEDURE — 99233 SBSQ HOSP IP/OBS HIGH 50: CPT | Mod: ,,, | Performed by: STUDENT IN AN ORGANIZED HEALTH CARE EDUCATION/TRAINING PROGRAM

## 2023-11-20 PROCEDURE — 83735 ASSAY OF MAGNESIUM: CPT | Performed by: INTERNAL MEDICINE

## 2023-11-20 PROCEDURE — 12000002 HC ACUTE/MED SURGE SEMI-PRIVATE ROOM

## 2023-11-20 PROCEDURE — 99233 PR SUBSEQUENT HOSPITAL CARE,LEVL III: ICD-10-PCS | Mod: ,,, | Performed by: STUDENT IN AN ORGANIZED HEALTH CARE EDUCATION/TRAINING PROGRAM

## 2023-11-20 PROCEDURE — C9113 INJ PANTOPRAZOLE SODIUM, VIA: HCPCS | Performed by: INTERNAL MEDICINE

## 2023-11-20 PROCEDURE — 25000003 PHARM REV CODE 250: Performed by: INTERNAL MEDICINE

## 2023-11-20 PROCEDURE — 94761 N-INVAS EAR/PLS OXIMETRY MLT: CPT

## 2023-11-20 PROCEDURE — 63600175 PHARM REV CODE 636 W HCPCS: Performed by: HOSPITALIST

## 2023-11-20 PROCEDURE — 25000003 PHARM REV CODE 250: Performed by: HOSPITALIST

## 2023-11-20 PROCEDURE — 63600175 PHARM REV CODE 636 W HCPCS: Performed by: INTERNAL MEDICINE

## 2023-11-20 PROCEDURE — 85027 COMPLETE CBC AUTOMATED: CPT | Performed by: INTERNAL MEDICINE

## 2023-11-20 PROCEDURE — 36415 COLL VENOUS BLD VENIPUNCTURE: CPT | Performed by: INTERNAL MEDICINE

## 2023-11-20 RX ADMIN — MUPIROCIN 1 G: 20 OINTMENT TOPICAL at 08:11

## 2023-11-20 RX ADMIN — ENOXAPARIN SODIUM 50 MG: 60 INJECTION SUBCUTANEOUS at 05:11

## 2023-11-20 RX ADMIN — MUPIROCIN 1 G: 20 OINTMENT TOPICAL at 09:11

## 2023-11-20 RX ADMIN — PANTOPRAZOLE SODIUM 40 MG: 40 INJECTION, POWDER, LYOPHILIZED, FOR SOLUTION INTRAVENOUS at 09:11

## 2023-11-20 RX ADMIN — LEVOTHYROXINE SODIUM 25 MCG: 0.03 TABLET ORAL at 05:11

## 2023-11-20 RX ADMIN — POLYETHYLENE GLYCOL 3350 17 G: 17 POWDER, FOR SOLUTION ORAL at 09:11

## 2023-11-20 NOTE — PLAN OF CARE
Pt is not medically clear for dc at this time. Plan to return to NH once medically clear and HR is at goal.       11/20/23 1431   Discharge Reassessment   Assessment Type Discharge Planning Reassessment   Discharge Plan A Return to nursing home   Discharge Plan B Return to Nursing Home

## 2023-11-20 NOTE — SUBJECTIVE & OBJECTIVE
Interval History: HR improved to 50s. Pacemaker placement plan discontinued. Denies CP. No new complaints from patient except feeling tired       Objective:     Vital Signs (Most Recent):  Temp: 98.6 °F (37 °C) (11/20/23 1101)  Pulse: (!) 56 (11/20/23 0701)  Resp: (!) 56 (11/20/23 0701)  BP: (!) 167/69 (11/20/23 0701)  SpO2: 95 % (11/20/23 0701) Vital Signs (24h Range):  Temp:  [97.6 °F (36.4 °C)-98.9 °F (37.2 °C)] 98.6 °F (37 °C)  Pulse:  [45-62] 56  Resp:  [16-56] 56  SpO2:  [94 %-98 %] 95 %  BP: (125-173)/(58-98) 167/69     Weight: 46.6 kg (102 lb 11.8 oz)  Body mass index is 17.1 kg/m².  No intake or output data in the 24 hours ending 11/20/23 1453        Physical Exam  Vitals reviewed.   Constitutional:       General: She is not in acute distress.     Comments: Elderly, thin, frail  Poor hearing   HENT:      Head: Normocephalic and atraumatic.   Cardiovascular:      Rate and Rhythm: Regular rhythm. Bradycardia present.   Pulmonary:      Effort: Pulmonary effort is normal. No respiratory distress.   Neurological:      General: No focal deficit present.      Mental Status: She is alert and oriented to person, place, and time. Mental status is at baseline.   Psychiatric:         Mood and Affect: Affect normal.         Behavior: Behavior normal.         Thought Content: Thought content normal.             Significant Labs: All pertinent labs within the past 24 hours have been reviewed.    Significant Imaging: I have reviewed all pertinent imaging results/findings within the past 24 hours.  Review of Systems

## 2023-11-20 NOTE — PROGRESS NOTES
[facility] Medicine  Progress Note    Patient Name: Jennifer Alarcon  MRN: [id number]  Patient Class: IP- Inpatient   Admission Date: 11/17/2023  Length of Stay: 3 days  Attending Physician: Maggi Rangel MD  Primary Care Provider: Tyrel Gonzales Jr., MD        Subjective:     Principal Problem:Symptomatic bradycardia        HPI:  89 year old patient getting transferred from Nursing home with HR in the lower 30s   Pt was here in this hospital on July 2023 with bradycardia/SSS  At that point she rejected PPM placement   She was later discharged to [facility]     Past 1 week her HR was in the range of 40s  Yesterday it went down to 30 s and pt was transferred here  When saw in ER pts sister was near bedside  Pt extremely anxious when mentioned about PPM and asking time till Monday before she makes any decision   Denies chest pain /SOB/Dizziness     Overview/Hospital Course:  89F with PMH HTN, hypothyroid, DM2, and pAfib is admitted with symptomatic bradycardia which is not a new thing for her. She has been recommended pacemaker in the past. Her heart rates were 30s-40s. She did not tolerate the dopamine infusion due to nausea. She did not require atropine. Cardiology consulted and is discussion with her for pacemaker again. Palliative consulted and she desires DNR. She has agreed to pacemaker placement scheduled for 11/20/23.    Cardiology evaluation 11/19/20: recommend increasing thyroid medication dose, monitor HR as it seems to be improving. SHe needs to understand importance of compliancy with medication. Treating underlying condition may resolve bradycardia issue with no need for pacemaker. She seems to understand. Ok to step down to [facility]. DC home when cleared by cardiology wit appropriate HR. HR in 50s.     Interval History: HR improved to 50s. Pacemaker placement plan discontinued. Denies CP. No new complaints from patient except feeling tired       Objective:     Vital  Signs (Most Recent):  Temp: 98.6 °F (37 °C) (11/20/23 1101)  Pulse: (!) 56 (11/20/23 0701)  Resp: (!) 56 (11/20/23 0701)  BP: (!) 167/69 (11/20/23 0701)  SpO2: 95 % (11/20/23 0701) Vital Signs (24h Range):  Temp:  [97.6 °F (36.4 °C)-98.9 °F (37.2 °C)] 98.6 °F (37 °C)  Pulse:  [45-62] 56  Resp:  [16-56] 56  SpO2:  [94 %-98 %] 95 %  BP: (125-173)/(58-98) 167/69     Weight: 46.6 kg (102 lb 11.8 oz)  Body mass index is 17.1 kg/m².  No intake or output data in the 24 hours ending 11/20/23 1453        Physical Exam  Vitals reviewed.   Constitutional:       General: She is not in acute distress.     Comments: Elderly, thin, frail  Poor hearing   HENT:      Head: Normocephalic and atraumatic.   Cardiovascular:      Rate and Rhythm: Regular rhythm. Bradycardia present.   Pulmonary:      Effort: Pulmonary effort is normal. No respiratory distress.   Neurological:      General: No focal deficit present.      Mental Status: She is alert and oriented to person, place, and time. Mental status is at baseline.   Psychiatric:         Mood and Affect: Affect normal.         Behavior: Behavior normal.         Thought Content: Thought content normal.             Significant Labs: All pertinent labs within the past 24 hours have been reviewed.    Significant Imaging: I have reviewed all pertinent imaging results/findings within the past 24 hours.  Review of Systems    Assessment/Plan:      * Symptomatic bradycardia  Rates 30s - 40s  She is DNR and does not want to be externally paced  Did not tolerate dopamine  - dc pacer pads  - tele  - atropine prn for decompensation  - cardiology consult: she is agreeable to pacemaker  -pacemaker plan discontinued  -HR improved to 50s.  - treating underlying hypothyroid     Subclinical hypothyroidism  Elevated TSH, FT4 normal   Started on levothyroxine 12.5mcg  Increase to 25mcg daily   Will need repeat levels in 4 weeks     Goals of care, counseling/discussion  - see palliative notes  -  DNR    Chronic combined systolic and diastolic heart failure  Euvolemic  - hold lasix and use prn    Walker as ambulation aid  Aware     Sick sinus syndrome  As above     Anxiety  Chronic  - monitor clinically; could consider low dose benzo if severe      Atrial fibrillation, controlled  Goes between afib and sinus patito  - therapeutic lovenox      VTE Risk Mitigation (From admission, onward)           Ordered     enoxaparin injection 50 mg  Every 12 hours         11/19/23 1225     IP VTE HIGH RISK PATIENT  Once         11/17/23 0306     Place sequential compression device  Until discontinued         11/17/23 0306                    Discharge Planning   JASMYNE: 11/21/2023     Code Status: DNR   Is the patient medically ready for discharge?:     Reason for patient still in hospital (select all that apply): Patient trending condition, Treatment, and Consult recommendations  Discharge Plan A: Return to nursing home                  Maggi Rangel MD  Department of Hospital Medicine   [facility]

## 2023-11-20 NOTE — CONSULTS
Posterior mid and left side of back, blister from a pacer pad, 6x20.1cm erupted healing blister pink wound bed, able to see outline of pad.  Applied thin layer of Triad and covered with mepilex.  Left buttock stage 2 pressure injury. 1.1x1.2x0.1cm open area, 7x5cm red non blanching periwound.  Patient states she has had for over a year.  Applied Triad and a mepilex

## 2023-11-20 NOTE — PROGRESS NOTES
[facility]  Department of Cardiology  Progress Note    PATIENT NAME: Jennifer Alarcon  MRN: [id number]  TODAY'S DATE: 11/20/2023  ADMIT DATE: 11/17/2023    SUBJECTIVE     PRINCIPLE PROBLEM: Symptomatic bradycardia  Maggi Rangel MD  Physician [facility] Medicine  89 year old patient getting transferred from Nursing home with HR in the lower 30s   Pt was here in this hospital on July 2023 with bradycardia/SSS  At that point she rejected PPM placement   She was later discharged to [facility]      Past 1 week her HR was in the range of 40s  Yesterday it went down to 30 s and pt was transferred here  When saw in ER pts sister was near bedside  Pt extremely anxious when mentioned about PPM and asking time till Monday before she makes any decision   Denies chest pain /SOB/Dizziness      Overview/Hospital Course:  89F with PMH HTN, hypothyroid, DM2, and pAfib is admitted with symptomatic bradycardia which is not a new thing for her. She has been recommended pacemaker in the past. Her heart rates were 30s-40s. She did not tolerate the dopamine infusion due to nausea. She did not require atropine. Cardiology consulted and is discussion with her for pacemaker again. Palliative consulted and she desires DNR. She has agreed to pacemaker placement scheduled for 11/20/23.     Cardiology evaluation 11/19/20: recommend increasing thyroid medication dose, monitor HR as it seems to be improving. SHe needs to understand importance of compliancy with medication. Treating underlying condition may resolve bradycardia issue with no need for pacemaker. She seems to understand. Ok to step down to [facility]. DC home when cleared by cardiology wit appropriate HR.     INTERVAL HISTORY:    11/20/2023  Patient was resting in bed, alone at present.  She denies lightheadedness, chest pain, or shortness breath.  Heart rate has been 50-60 beats per minute, atrial fibrillation.  TSH is 9.1  Creatinine 0.6.  Patient states she was  sitting on side of bed to eat today.  Has no complaints other than generalized weakness.      11/19/23  At the present time patient's heart rate is about 53 beats per minute.  She is awake alert denies any dizziness.  Discussed with patient at length and in detail along with the family member in regards with permanent pacemaker.  Patient was significantly hypothyroid and replacing thyroxine would be the most appropriate step to get her heart rate up and would also ramp up her thyroxine requirements for the body.  Patient is very anxious about undergoing pacemaker implantation.    Review of patient's allergies indicates:   Allergen Reactions    Nitrofurantoin monohyd/m-cryst      leg cramps       REVIEW OF SYSTEMS  CARDIOVASCULAR:  Generalized weakness.  No recent chest pain, palpitations, arm, neck, or jaw pain  RESPIRATORY: No recent fever, cough chills, SOB or congestion  : No blood in the urine  GI: No Nausea, vomiting, constipation, diarrhea, blood, or reflux.  MUSCULOSKELETAL: No myalgias  NEURO: No lightheadedness or dizziness      OBJECTIVE     VITAL SIGNS (Most Recent)  Temp: 98.9 °F (37.2 °C) (11/20/23 0701)  Pulse: (!) 56 (11/20/23 0701)  Resp: (!) 56 (11/20/23 0701)  BP: (!) 167/69 (11/20/23 0701)  SpO2: 95 % (11/20/23 0701)    VENTILATION STATUS  Resp: (!) 56 (11/20/23 0701)  SpO2: 95 % (11/20/23 0701)           I & O (Last 24H):No intake or output data in the 24 hours ending 11/20/23 1037      WEIGHTS  Wt Readings from Last 1 Encounters:   11/17/23 0501 46.6 kg (102 lb 11.8 oz)   11/17/23 0022 46.7 kg (103 lb)       PHYSICAL EXAM  CONSTITUTIONAL:  Elderly female, thin, frail, hard of hearing, poor dentition breathing in no distress   NECK: no carotid bruit, no JVD  LUNGS:  Breath sounds clear, decreased at the bases.  CHEST WALL: no tenderness  HEART:  Atrial fibrillation, rate is controlled, Systolic murmur audible.   ABDOMEN: soft, flat, nontender, nondistended  EXTREMITIES: Extremities normal, no  edema  NEURO: AAO X 3 no gross focal deficits.    SCHEDULED MEDS:   enoxparin  50 mg Subcutaneous Q12H (treatment, non-standard time)    levothyroxine  25 mcg Oral Before breakfast    mupirocin   Nasal BID    pantoprazole  40 mg Intravenous Daily    polyethylene glycol  17 g Oral Daily       CONTINUOUS INFUSIONS:    PRN MEDS:acetaminophen, atropine, magnesium oxide, magnesium oxide, ondansetron, potassium bicarbonate, potassium bicarbonate, potassium bicarbonate, potassium, sodium phosphates, potassium, sodium phosphates, potassium, sodium phosphates, prochlorperazine    LABS AND DIAGNOSTICS     CBC LAST 3 DAYS  Recent Labs   Lab 11/17/23  0035 11/17/23 0424 11/18/23 0317 11/19/23 0458 11/20/23 0437   WBC 7.02   < > 6.48 11.00 5.46   RBC 4.21   < > 4.06 4.26 4.25   HGB 12.1   < > 11.6* 12.2 12.0   HCT 37.5   < > 36.5* 37.8 37.6   MCV 89   < > 90 89 89   MCH 28.7   < > 28.6 28.6 28.2   MCHC 32.3   < > 31.8* 32.3 31.9*   RDW 14.2   < > 14.5 14.2 14.5      < > 194 209 199   MPV 10.4   < > 10.4 10.7 10.6   GRAN 56.6  4.0  --   --   --   --    LYMPH 29.6  2.1  --   --   --   --    MONO 9.3  0.7  --   --   --   --    BASO 0.05  --   --   --   --    NRBC 0  --   --   --   --     < > = values in this interval not displayed.         COAGULATION LAST 3 DAYS  Recent Labs   Lab 11/17/23 0424 11/18/23 0317 11/19/23 0458   INR 1.1 1.1 1.1         CHEMISTRY LAST 3 DAYS  Recent Labs   Lab 11/18/23 0317 11/19/23 0458 11/20/23 0437    139 140   K 4.1 3.9 4.3    106 106   CO2 27 28 31*   ANIONGAP 7* 5* 3*   BUN 19 16 14   CREATININE 0.6 0.6 0.6   GLU 80 88 78   CALCIUM 8.8 8.9 8.8   MG 1.9 1.8 1.8   ALBUMIN 3.1* 3.1* 3.0*   BILITOT 1.0 1.3* 0.9   PROT 5.7* 6.0 5.8*   ALKPHOS 37* 38* 40*   ALT 8* 8* 7*   AST 13 12 11         CARDIAC PROFILE LAST 3 DAYS  Recent Labs   Lab 11/17/23 0035 11/17/23  0231   *  --    TROPONINIHS 11.0 9.2         ENDOCRINE LAST 3 DAYS  Recent Labs   Lab 11/17/23 0035  "  TSH 9.143*         LAST ARTERIAL BLOOD GAS  ABG  No results for input(s): "PH", "PO2", "PCO2", "HCO3", "BE" in the last 168 hours.    LAST 7 DAYS MICROBIOLOGY   Microbiology Results (last 7 days)       ** No results found for the last 168 hours. **            MOST RECENT IMAGING  Echo    Left Ventricle: The left ventricle is normal in size. Normal wall   thickness. Normal wall motion. There is normal systolic function with a   visually estimated ejection fraction of 60 - 65%. There is normal   diastolic function.    Right Ventricle: Normal right ventricular cavity size. Wall thickness   is normal. Right ventricle wall motion  is normal. Systolic function is   normal.    Left Atrium: Left atrium is moderately dilated.    Mitral Valve: There is mild regurgitation with a centrally directed   jet.    Tricuspid Valve: There is mild to moderate regurgitation.    Pulmonic Valve: There is mild regurgitation with a centrally directed   jet.    Pulmonary Artery: There is moderate pulmonary hypertension. The   estimated pulmonary artery systolic pressure is 56 mmHg.    IVC/SVC: Normal venous pressure at 3 mmHg.    Pericardium: Left pleural effusion measuring 4 cm  X-Ray Chest AP Portable  XR CHEST 1 VIEW    CLINICAL HISTORY:  89 years Female Chest Pain    COMPARISON: July 17, 2023    FINDINGS: Cardiac silhouette size is mildly enlarged. Atherosclerotic calcification of the aorta. Pleural parenchymal opacity at the left lung base consistent with small left pleural effusion. Blunting of right costophrenic angle consistent with trace right pleural fluid. No chely pulmonary edema. No confluent airspace disease. No pneumothorax. Osteopenia.    IMPRESSION:    Small volume left and trace right pleural fluid.    Mild cardiomegaly.    Electronically signed by:  Brannon Hopkins MD  11/17/2023 07:13 AM CST Workstation: [id number]      [facility]  Results for orders placed during the hospital encounter of 11/17/23    Echo    Interpretation " Summary    Left Ventricle: The left ventricle is normal in size. Normal wall thickness. Normal wall motion. There is normal systolic function with a visually estimated ejection fraction of 60 - 65%. There is normal diastolic function.    Right Ventricle: Normal right ventricular cavity size. Wall thickness is normal. Right ventricle wall motion  is normal. Systolic function is normal.    Left Atrium: Left atrium is moderately dilated.    Mitral Valve: There is mild regurgitation with a centrally directed jet.    Tricuspid Valve: There is mild to moderate regurgitation.    Pulmonic Valve: There is mild regurgitation with a centrally directed jet.    Pulmonary Artery: There is moderate pulmonary hypertension. The estimated pulmonary artery systolic pressure is 56 mmHg.    IVC/SVC: Normal venous pressure at 3 mmHg.    Pericardium: Left pleural effusion measuring 4 cm      CURRENT/PREVIOUS VISIT EKG  Results for orders placed or performed during the hospital encounter of 11/17/23   EKG 12-lead    Collection Time: 11/17/23 12:24 AM    Narrative    Test Reason : R07.9,    Vent. Rate : 043 BPM     Atrial Rate : 258 BPM     P-R Int : 000 ms          QRS Dur : 112 ms      QT Int : 496 ms       P-R-T Axes : 000 -77 090 degrees     QTc Int : 419 ms    Atrial fibrillation with slow ventricular response  Left axis deviation  Low voltage QRS  Inferior infarct ,age undetermined  Cannot rule out Anteroseptal infarct ,age undetermined  Abnormal ECG  When compared with ECG of 17-JUL-2023 01:54,  Significant changes have occurred    Referred By: ROBER   SELF           Confirmed By:        ASSESSMENT/PLAN:     Active Hospital Problems    Diagnosis    *Symptomatic bradycardia    Subclinical hypothyroidism    Chronic combined systolic and diastolic heart failure    Goals of care, counseling/discussion    Sick sinus syndrome    Walker as ambulation aid    Anxiety    Atrial fibrillation, controlled       ASSESSMENT & PLAN:   1.  Hypothyroidism uncorrected.    2. Atrial Fibrillation with slow ventricular response  3. Possible sick sinus syndrome  4. Bradycardia appears to be multifactorial  5. HFrEF       RECOMMENDATIONS:  1. Continue levothyroxine supplementation  2. Patient does not meet criteria for permanent pacemaker at this time.    3. If her heart rate stabilizes with supplementation of thyroxine then we can continue medical management.  4. Patient has mild LV dysfunction with grade 3 diastolic dysfunction.    5. Anticoagulation with Lovenox in presence of atrial fibrillation  6. Continue to hold any AV robson blocker medications  7. Continue to observe continuous telemetry and monitor closely for bradycardia           Jadyn Swift NP  [facility]  Department of Cardiology  Date of Service: 11/20/2023  4:05 PM

## 2023-11-20 NOTE — RESPIRATORY THERAPY
11/19/23 2045   Patient Assessment/Suction   Level of Consciousness (AVPU) alert   Expansion/Accessory Muscles/Retractions no use of accessory muscles;no retractions;expansion symmetric   All Lung Fields Breath Sounds Anterior:;Lateral:;clear;equal bilaterally   Rhythm/Pattern, Respiratory unlabored;pattern regular;depth regular;no shortness of breath reported   Cough Frequency no cough   PRE-TX-O2   Device (Oxygen Therapy) room air   SpO2 96 %   Pulse Oximetry Type Continuous   $ Pulse Oximetry - Multiple Charge Pulse Oximetry - Multiple   Pulse (!) 53   Resp (!) 51   Positioning HOB elevated 30 degrees   Education   $ Education 15 min;Other (see comment)  (O2 CHECK)   Respiratory Evaluation   $ Care Plan Tech Time 15 min   $ Eval/Re-eval Charges Evaluation

## 2023-11-21 PROBLEM — I27.20 PULMONARY HYPERTENSION: Status: ACTIVE | Noted: 2023-11-21

## 2023-11-21 PROBLEM — D64.9 NORMOCYTIC ANEMIA: Status: ACTIVE | Noted: 2023-11-21

## 2023-11-21 PROBLEM — I48.91 ATRIAL FIBRILLATION: Status: ACTIVE | Noted: 2023-11-21

## 2023-11-21 LAB
ALBUMIN SERPL BCP-MCNC: 3 G/DL (ref 3.5–5.2)
ALP SERPL-CCNC: 34 U/L (ref 55–135)
ALT SERPL W/O P-5'-P-CCNC: 8 U/L (ref 10–44)
ANION GAP SERPL CALC-SCNC: 4 MMOL/L (ref 8–16)
AST SERPL-CCNC: 13 U/L (ref 10–40)
BILIRUB SERPL-MCNC: 1.2 MG/DL (ref 0.1–1)
BUN SERPL-MCNC: 15 MG/DL (ref 10–30)
CALCIUM SERPL-MCNC: 8.9 MG/DL (ref 8.7–10.5)
CHLORIDE SERPL-SCNC: 106 MMOL/L (ref 95–110)
CO2 SERPL-SCNC: 29 MMOL/L (ref 23–29)
CREAT SERPL-MCNC: 0.5 MG/DL (ref 0.5–1.4)
ERYTHROCYTE [DISTWIDTH] IN BLOOD BY AUTOMATED COUNT: 14.6 % (ref 11.5–14.5)
EST. GFR  (NO RACE VARIABLE): >60 ML/MIN/1.73 M^2
GLUCOSE SERPL-MCNC: 87 MG/DL (ref 70–110)
HCT VFR BLD AUTO: 34.7 % (ref 37–48.5)
HGB BLD-MCNC: 11.5 G/DL (ref 12–16)
MAGNESIUM SERPL-MCNC: 1.8 MG/DL (ref 1.6–2.6)
MCH RBC QN AUTO: 29.6 PG (ref 27–31)
MCHC RBC AUTO-ENTMCNC: 33.1 G/DL (ref 32–36)
MCV RBC AUTO: 89 FL (ref 82–98)
PLATELET # BLD AUTO: 210 K/UL (ref 150–450)
PMV BLD AUTO: 10.8 FL (ref 9.2–12.9)
POTASSIUM SERPL-SCNC: 4.3 MMOL/L (ref 3.5–5.1)
PROT SERPL-MCNC: 5.6 G/DL (ref 6–8.4)
RBC # BLD AUTO: 3.89 M/UL (ref 4–5.4)
SODIUM SERPL-SCNC: 139 MMOL/L (ref 136–145)
WBC # BLD AUTO: 5.5 K/UL (ref 3.9–12.7)

## 2023-11-21 PROCEDURE — 36415 COLL VENOUS BLD VENIPUNCTURE: CPT | Performed by: INTERNAL MEDICINE

## 2023-11-21 PROCEDURE — 83735 ASSAY OF MAGNESIUM: CPT | Performed by: INTERNAL MEDICINE

## 2023-11-21 PROCEDURE — 99233 SBSQ HOSP IP/OBS HIGH 50: CPT | Mod: ,,, | Performed by: STUDENT IN AN ORGANIZED HEALTH CARE EDUCATION/TRAINING PROGRAM

## 2023-11-21 PROCEDURE — 25000003 PHARM REV CODE 250: Performed by: INTERNAL MEDICINE

## 2023-11-21 PROCEDURE — 97161 PT EVAL LOW COMPLEX 20 MIN: CPT

## 2023-11-21 PROCEDURE — 25000003 PHARM REV CODE 250: Performed by: HOSPITALIST

## 2023-11-21 PROCEDURE — 80053 COMPREHEN METABOLIC PANEL: CPT | Performed by: INTERNAL MEDICINE

## 2023-11-21 PROCEDURE — 12000002 HC ACUTE/MED SURGE SEMI-PRIVATE ROOM

## 2023-11-21 PROCEDURE — 63600175 PHARM REV CODE 636 W HCPCS: Performed by: INTERNAL MEDICINE

## 2023-11-21 PROCEDURE — 99233 PR SUBSEQUENT HOSPITAL CARE,LEVL III: ICD-10-PCS | Mod: ,,, | Performed by: STUDENT IN AN ORGANIZED HEALTH CARE EDUCATION/TRAINING PROGRAM

## 2023-11-21 PROCEDURE — 63600175 PHARM REV CODE 636 W HCPCS: Performed by: HOSPITALIST

## 2023-11-21 PROCEDURE — 97116 GAIT TRAINING THERAPY: CPT

## 2023-11-21 PROCEDURE — 85027 COMPLETE CBC AUTOMATED: CPT | Performed by: INTERNAL MEDICINE

## 2023-11-21 RX ORDER — MAGNESIUM SULFATE HEPTAHYDRATE 40 MG/ML
2 INJECTION, SOLUTION INTRAVENOUS ONCE
Status: COMPLETED | OUTPATIENT
Start: 2023-11-21 | End: 2023-11-21

## 2023-11-21 RX ADMIN — ENOXAPARIN SODIUM 50 MG: 60 INJECTION SUBCUTANEOUS at 05:11

## 2023-11-21 RX ADMIN — POLYETHYLENE GLYCOL 3350 17 G: 17 POWDER, FOR SOLUTION ORAL at 09:11

## 2023-11-21 RX ADMIN — MAGNESIUM SULFATE HEPTAHYDRATE 2 G: 40 INJECTION, SOLUTION INTRAVENOUS at 09:11

## 2023-11-21 RX ADMIN — ENOXAPARIN SODIUM 50 MG: 60 INJECTION SUBCUTANEOUS at 06:11

## 2023-11-21 RX ADMIN — LEVOTHYROXINE SODIUM 25 MCG: 0.03 TABLET ORAL at 05:11

## 2023-11-21 RX ADMIN — MUPIROCIN 1 G: 20 OINTMENT TOPICAL at 09:11

## 2023-11-21 NOTE — NURSING
Nurses Note -- 4 Eyes      11/20/2023   6:55 PM      Skin assessed during: Transfer      [] No Altered Skin Integrity Present    []Prevention Measures Documented      [x] Yes- Altered Skin Integrity Present or Discovered   [] LDA Added if Not in Epic (Describe Wound)   [] New Altered Skin Integrity was Present on Admit and Documented in LDA   [x] Wound Image Taken    Wound Care Consulted? Yes    Attending Nurse:  Patricia Hernandez RN/Staff Member:  Joaquina Rees RN

## 2023-11-21 NOTE — PROGRESS NOTES
[facility] Medicine  Progress Note    Patient Name: Jennifer Alarcon  MRN: [id number]  Patient Class: IP- Inpatient   Admission Date: 11/17/2023  Length of Stay: 4 days  Attending Physician: Leslie Castellanos MD  Primary Care Provider: Tyrel Gonzales Jr., MD        Subjective:     Principal Problem:Atrial fibrillation        HPI:  89 year old patient getting transferred from Nursing home with HR in the lower 30s   Pt was here in this hospital on July 2023 with bradycardia/SSS  At that point she rejected PPM placement   She was later discharged to [facility]     Past 1 week her HR was in the range of 40s  Yesterday it went down to 30 s and pt was transferred here  When saw in ER pts sister was near bedside  Pt extremely anxious when mentioned about PPM and asking time till Monday before she makes any decision   Denies chest pain /SOB/Dizziness     Overview/Hospital Course:  89F with PMH HTN, hypothyroid, DM2, and pAfib is admitted with symptomatic bradycardia which is not a new thing for her. She has been recommended pacemaker in the past. Her heart rates were 30s-40s. She did not tolerate the dopamine infusion due to nausea. She did not require atropine. Cardiology consulted and is discussion with her for pacemaker again. Palliative consulted and she desires DNR. She has agreed to pacemaker placement scheduled for 11/20/23.    Cardiology evaluation 11/19/20: recommend increasing thyroid medication dose, monitor HR as it seems to be improving. SHe needs to understand importance of compliancy with medication. Treating underlying condition may resolve bradycardia issue with no need for pacemaker. She seems to understand. Ok to step down to med [facility] by cardiology wit appropriate HR. HR in 50s.     Assumed care of this patient on 11/21/2023.  Patient current nursing home resident ([place]), history of atrial fibrillation/flutter (on digoxin 125 mcg and Eliquis POA)  with prior admission for bradycardia which time declined PPM who was transferred to the ED due to bradycardia with heart rates in the 30s.  EKG with atrial fibrillation with slow ventricular response, serial high sensitivity troponin negative, , TSH elevated at 9.14 with normal free T4, subclinical hypothyroidism, transthoracic echocardiogram with EF of 60-65% with normal diastolic dysfunction with pulmonary hypertension with PASP 56.  Digoxin level on presentation < 0.3 and held given bradycardia/slow ventricular response.  Home Eliquis held in anticipation potential need for permanent pacemaker and switched to therapeutic dose Lovenox, seen by Cardiology in consultation and initially was planned for pacemaker but on review current recommendations no indication for pacemaker.  Seen by palliative care, DNR code status.  On 11/21, atrial flutter with slow ventricular response, BP stable, currently on levothyroxine 25 mcg.      Interval History:  See hospital course.  Denies any complaints today including lightheadedness/dizziness, chest pain, palpitation, shortness of breath, nausea, vomiting.  States she had regular bowel movement yesterday.  Telemetry atrial flutter with slow ventricular response, heart rate high 40s to 50s, BP stable.  Previous labs with TSH 9.14 with normal free T4 1.16.  Transthoracic echocardiogram EF of 60-65%.  Plan of care discussed with patient, no visitors at bedside.    Review of Systems   Constitutional:  Negative for fever.   Respiratory:  Negative for shortness of breath.    Cardiovascular:  Negative for chest pain.   Gastrointestinal:  Negative for constipation (Had BM yesterday), nausea and vomiting.   Neurological:  Negative for light-headedness.     Objective:     Vital Signs (Most Recent):  Temp: 97.8 °F (36.6 °C) (11/21/23 1120)  Pulse: 60 (11/21/23 1120)  Resp: 20 (11/21/23 1120)  BP: 136/61 (11/21/23 1120)  SpO2: 96 % (11/21/23 1120) Vital Signs (24h Range):  Temp:   "[97.5 °F (36.4 °C)-97.9 °F (36.6 °C)] 97.8 °F (36.6 °C)  Pulse:  [52-60] 60  Resp:  [18-22] 20  SpO2:  [94 %-96 %] 96 %  BP: (130-166)/(61-74) 136/61     Weight: 46.6 kg (102 lb 11.8 oz)  Body mass index is 17.1 kg/m².    Intake/Output Summary (Last 24 hours) at 11/21/2023 1705  Last data filed at 11/21/2023 1348  Gross per 24 hour   Intake 675.55 ml   Output 800 ml   Net -124.45 ml         Physical Exam  Vitals and nursing note reviewed.   Constitutional:       Comments: Elderly female patient, no apparent acute distress, pleasant and cooperative   HENT:      Ears:      Comments: Hard of hearing     Mouth/Throat:      Mouth: Mucous membranes are moist.      Comments: Dental caries present  Cardiovascular:      Rate and Rhythm: Bradycardia present. Rhythm irregular.      Comments: Warm peripheries, no lower extremity edema  Pulmonary:      Comments: On room air, no wheeze or accessory muscle use  Abdominal:      General: There is no distension.      Palpations: Abdomen is soft.      Tenderness: There is no abdominal tenderness.   Musculoskeletal:      Comments: Low BMI   Neurological:      Mental Status: She is alert and oriented to person, place, and time.      Comments: Moving all 4 extremities, following commands   Psychiatric:         Mood and Affect: Mood normal.             Significant Labs: BMP:   Recent Labs   Lab 11/21/23  0539   GLU 87      K 4.3      CO2 29   BUN 15   CREATININE 0.5   CALCIUM 8.9   MG 1.8     CBC:   Recent Labs   Lab 11/20/23  0437 11/21/23  0539   WBC 5.46 5.50   HGB 12.0 11.5*   HCT 37.6 34.7*    210     CMP:   Recent Labs   Lab 11/20/23  0437 11/21/23  0539    139   K 4.3 4.3    106   CO2 31* 29   GLU 78 87   BUN 14 15   CREATININE 0.6 0.5   CALCIUM 8.8 8.9   PROT 5.8* 5.6*   ALBUMIN 3.0* 3.0*   BILITOT 0.9 1.2*   ALKPHOS 40* 34*   AST 11 13   ALT 7* 8*   ANIONGAP 3* 4*     Cardiac Markers: No results for input(s): "CKMB", "MYOGLOBIN", "BNP", "TROPISTAT" " "in the last 48 hours.  Lactic Acid: No results for input(s): "LACTATE" in the last 48 hours.  POCT Glucose: No results for input(s): "POCTGLUCOSE" in the last 48 hours.  Troponin: No results for input(s): "TROPONINI", "TROPONINIHS" in the last 48 hours.  TSH:   Recent Labs   Lab 11/17/23  0035   TSH 9.143*     Urine Culture: No results for input(s): "LABURIN" in the last 48 hours.  Urine Studies: No results for input(s): "COLORU", "APPEARANCEUA", "PHUR", "SPECGRAV", "PROTEINUA", "GLUCUA", "KETONESU", "BILIRUBINUA", "OCCULTUA", "NITRITE", "UROBILINOGEN", "LEUKOCYTESUR", "RBCUA", "WBCUA", "BACTERIA", "SQUAMEPITHEL", "HYALINECASTS" in the last 48 hours.    Invalid input(s): "WRIGHTSUR"    Significant Imaging: I have reviewed all pertinent imaging results/findings within the past 24 hours.    Echo    Result Date: 11/17/2023    Left Ventricle: The left ventricle is normal in size. Normal wall thickness. Normal wall motion. There is normal systolic function with a visually estimated ejection fraction of 60 - 65%. There is normal diastolic function.   Right Ventricle: Normal right ventricular cavity size. Wall thickness is normal. Right ventricle wall motion  is normal. Systolic function is normal.   Left Atrium: Left atrium is moderately dilated.   Mitral Valve: There is mild regurgitation with a centrally directed jet.   Tricuspid Valve: There is mild to moderate regurgitation.   Pulmonic Valve: There is mild regurgitation with a centrally directed jet.   Pulmonary Artery: There is moderate pulmonary hypertension. The estimated pulmonary artery systolic pressure is 56 mmHg.   IVC/SVC: Normal venous pressure at 3 mmHg.   Pericardium: Left pleural effusion measuring 4 cm     X-Ray Chest AP Portable    Result Date: 11/17/2023  XR CHEST 1 VIEW CLINICAL HISTORY: 89 years Female Chest Pain COMPARISON: July 17, 2023 FINDINGS: Cardiac silhouette size is mildly enlarged. Atherosclerotic calcification of the aorta. Pleural " parenchymal opacity at the left lung base consistent with small left pleural effusion. Blunting of right costophrenic angle consistent with trace right pleural fluid. No chely pulmonary edema. No confluent airspace disease. No pneumothorax. Osteopenia. IMPRESSION: Small volume left and trace right pleural fluid. Mild cardiomegaly. Electronically signed by:  Brannon Hopkins MD  11/17/2023 07:13 AM CST Workstation: [id number]       Assessment/Plan:      Active Hospital Problems    Diagnosis    *Atrial fibrillation/flutter with slow ventricular response    Sick sinus syndrome    Subclinical hypothyroidism    Pulmonary hypertension    Normocytic anemia    Walker as ambulation aid    Anxiety    Advanced age    DNR (do not resuscitate)    Current use of long term anticoagulation     Plan:  Continue care on medical floor with remote telemetry monitoring   Atrial fibrillation/flutter with slow ventricular response and concern for sick sinus syndrome  TSH elevated with normal free T4, subclinical hypothyroidism, started on levothyroxine 25 mcg, will need outpatient TFT 4-6 weeks   Transthoracic echocardiogram with EF of 60-65% with normal diastolic function, PASP 56   Home digoxin discontinued, avoid AV robson agents   Eliquis on hold in setting of need for procedure/ppm, currently on therapeutic dose Lovenox   2 g IV magnesium supplementation  Fall and delirium precautions   Electrolytes sliding scale repletion  Intermittent lab checks   Increase activity, out of bed to chair, admitted from [facility]   Appreciate Cardiology input   Appreciate palliative Care input   Appreciate wound care recommendations  Further plan as per hospital course    Wound care recommendations  Posterior mid and left side of back, blister from a pacer pad, 6x20.1cm erupted healing blister pink wound bed, able to see outline of pad.  Applied thin layer of Triad and covered with mepilex.  Left buttock stage 2 pressure injury.  1.1x1.2x0.1cm open area, 7x5cm red non blanching periwound.  Patient states she has had for over a year.  Applied Triad and a mepilex                 VTE Risk Mitigation (From admission, onward)           Ordered     enoxaparin injection 50 mg  Every 12 hours         11/19/23 1225     IP VTE HIGH RISK PATIENT  Once         11/17/23 0306     Place sequential compression device  Until discontinued         11/17/23 0306                    Discharge Planning   JASMYNE: 11/22/2023     Code Status: DNR   Is the patient medically ready for discharge?:     Reason for patient still in hospital (select all that apply): Consult recommendations  Discharge Plan A: Return to nursing home                  Leslie Castellanos MD  Department of Hospital Medicine   [facility]

## 2023-11-21 NOTE — PT/OT/SLP EVAL
"Physical Therapy Evaluation    Patient Name:  Jennifer Alarcon   MRN:  [id number]    Recommendations:     Discharge Recommendations: No Therapy Indicated (Return to NH)   Discharge Equipment Recommendations: none   Barriers to discharge: None     Assessment:     Jennifer Alarcon is a 89 y.o. female admitted with a medical diagnosis of Symptomatic bradycardia.  She presents with the following impairments/functional limitations: impaired functional mobility, gait instability, impaired balance, weakness, impaired endurance, impaired cardiopulmonary response to activity.    Pt found HOB elevated and reluctantly agreeable to working with PT due to fatigue. Pt A & O x  4 and has the following co-morbidities: A-Fib, HF, Anxiety, DM, Hx Colon CA.  Pt tolerated session fairly and required only minimal a > CGA  for safe mobilization during session today. Pt reported she gets oob to w/c and scoots around using her legs in the mornings and she walks with her RW in the afternoons.  Pt would benefit from acute PT during hospitalization to increase strength, endurance and safety with mobility and should be able to resume her usual activity upon discharge back to Nursing Home.      Rehab Prognosis: Fair; patient would benefit from acute skilled PT services to address these deficits and reach maximum level of function.    Recent Surgery: * No surgery found *      Plan:     During this hospitalization, patient to be seen 3 x/week to address the identified rehab impairments via gait training, therapeutic activities, therapeutic exercises and progress toward the following goals:    Plan of Care Expires:  12/19/23    Subjective     Chief Complaint: "I'm very tired."  Patient/Family Comments/goals: return to her usual activity at NH  Pain/Comfort:  Pain Rating 1: 0/10  Pain Rating Post-Intervention 1: 0/10    Patients cultural, spiritual, Shinto conflicts given the current situation:      Living Environment:  Pt is a resident of " [facility] for approximately 4 months.  Prior to admission, patients level of function was independent with transfers and gait using RW or scooting around in her w/c.  Equipment used at home: wheelchair, walker, rolling.  DME owned (not currently used): none.  Upon discharge, patient will have assistance from facility staff.    Objective:     Communicated with TAWANNA Uribe prior to and after session.  Patient found HOB elevated with bed alarm, PureWick, peripheral IV, telemetry  upon PT entry to room.    General Precautions: Standard, diabetic, fall  Orthopedic Precautions:N/A   Braces: N/A  Respiratory Status: Room air    Exams:  Cognitive Exam:  Patient is oriented to Person, Place, Time, and Situation  RLE ROM: WFL  RLE Strength: NT  LLE ROM: WFL  LLE Strength: NT    Functional Mobility:  Bed Mobility:     Scooting: stand by assistance and contact guard assistance  Supine to Sit: minimum assistance  Sit to Supine: minimum assistance  Transfers:     Sit to Stand:  contact guard assistance with rolling walker  Gait: x 75' with RW and CGA for safety.       AM-PAC 6 CLICK MOBILITY  Total Score:18       Treatment & Education:  Pt was educated on the following: call light use, importance of OOB activity and functional mobility to negate the negative effects of prolonged bed rest during this hospitalization, safe transfers/ambulation and discharge planning recommendations/options.      Patient left HOB elevated with all lines intact, call button in reach, bed alarm on, and RN notified.    GOALS:   Multidisciplinary Problems       Physical Therapy Goals          Problem: Physical Therapy    Goal Priority Disciplines Outcome Goal Variances Interventions   Physical Therapy Goal     PT, PT/OT      Description: Goals to be met by: 23     Patient will increase functional independence with mobility by performin. Supine to sit with Supervision  2. Sit to stand transfer with Supervision  3. Bed to chair transfer  with Supervision using Rolling Walker  4. Gait  x 150 feet with Supervision using Rolling Walker.                              History:     Past Medical History:   Diagnosis Date    Atrial fibrillation, controlled     Bullous pemphigoid     CHF (congestive heart failure)     Colon cancer     Diabetes mellitus, type 2     Eczema     Hypertension     Hypothyroidism        Past Surgical History:   Procedure Laterality Date    APPENDECTOMY      BREAST SURGERY      lt breast bx    CHOLECYSTECTOMY      COLON SURGERY      EYE SURGERY         Time Tracking:     PT Received On: 11/21/23  PT Start Time: 1020     PT Stop Time: 1047  PT Total Time (min): 27 min     Billable Minutes: Evaluation 10 and Gait Training 17      11/21/2023

## 2023-11-21 NOTE — SUBJECTIVE & OBJECTIVE
Interval History:  See hospital course.  Denies any complaints today including lightheadedness/dizziness, chest pain, palpitation, shortness of breath, nausea, vomiting.  States she had regular bowel movement yesterday.  Telemetry atrial flutter with slow ventricular response, heart rate high 40s to 50s, BP stable.  Previous labs with TSH 9.14 with normal free T4 1.16.  Transthoracic echocardiogram EF of 60-65%.  Plan of care discussed with patient, no visitors at bedside.    Review of Systems   Constitutional:  Negative for fever.   Respiratory:  Negative for shortness of breath.    Cardiovascular:  Negative for chest pain.   Gastrointestinal:  Negative for constipation (Had BM yesterday), nausea and vomiting.   Neurological:  Negative for light-headedness.     Objective:     Vital Signs (Most Recent):  Temp: 97.8 °F (36.6 °C) (11/21/23 1120)  Pulse: 60 (11/21/23 1120)  Resp: 20 (11/21/23 1120)  BP: 136/61 (11/21/23 1120)  SpO2: 96 % (11/21/23 1120) Vital Signs (24h Range):  Temp:  [97.5 °F (36.4 °C)-97.9 °F (36.6 °C)] 97.8 °F (36.6 °C)  Pulse:  [52-60] 60  Resp:  [18-22] 20  SpO2:  [94 %-96 %] 96 %  BP: (130-166)/(61-74) 136/61     Weight: 46.6 kg (102 lb 11.8 oz)  Body mass index is 17.1 kg/m².    Intake/Output Summary (Last 24 hours) at 11/21/2023 1705  Last data filed at 11/21/2023 1348  Gross per 24 hour   Intake 675.55 ml   Output 800 ml   Net -124.45 ml         Physical Exam  Vitals and nursing note reviewed.   Constitutional:       Comments: Elderly female patient, no apparent acute distress, pleasant and cooperative   HENT:      Ears:      Comments: Hard of hearing     Mouth/Throat:      Mouth: Mucous membranes are moist.      Comments: Dental caries present  Cardiovascular:      Rate and Rhythm: Bradycardia present. Rhythm irregular.      Comments: Warm peripheries, no lower extremity edema  Pulmonary:      Comments: On room air, no wheeze or accessory muscle use  Abdominal:      General: There is no  "distension.      Palpations: Abdomen is soft.      Tenderness: There is no abdominal tenderness.   Musculoskeletal:      Comments: Low BMI   Neurological:      Mental Status: She is alert and oriented to person, place, and time.      Comments: Moving all 4 extremities, following commands   Psychiatric:         Mood and Affect: Mood normal.             Significant Labs: BMP:   Recent Labs   Lab 11/21/23  0539   GLU 87      K 4.3      CO2 29   BUN 15   CREATININE 0.5   CALCIUM 8.9   MG 1.8     CBC:   Recent Labs   Lab 11/20/23 0437 11/21/23  0539   WBC 5.46 5.50   HGB 12.0 11.5*   HCT 37.6 34.7*    210     CMP:   Recent Labs   Lab 11/20/23 0437 11/21/23  0539    139   K 4.3 4.3    106   CO2 31* 29   GLU 78 87   BUN 14 15   CREATININE 0.6 0.5   CALCIUM 8.8 8.9   PROT 5.8* 5.6*   ALBUMIN 3.0* 3.0*   BILITOT 0.9 1.2*   ALKPHOS 40* 34*   AST 11 13   ALT 7* 8*   ANIONGAP 3* 4*     Cardiac Markers: No results for input(s): "CKMB", "MYOGLOBIN", "BNP", "TROPISTAT" in the last 48 hours.  Lactic Acid: No results for input(s): "LACTATE" in the last 48 hours.  POCT Glucose: No results for input(s): "POCTGLUCOSE" in the last 48 hours.  Troponin: No results for input(s): "TROPONINI", "TROPONINIHS" in the last 48 hours.  TSH:   Recent Labs   Lab 11/17/23  0035   TSH 9.143*     Urine Culture: No results for input(s): "LABURIN" in the last 48 hours.  Urine Studies: No results for input(s): "COLORU", "APPEARANCEUA", "PHUR", "SPECGRAV", "PROTEINUA", "GLUCUA", "KETONESU", "BILIRUBINUA", "OCCULTUA", "NITRITE", "UROBILINOGEN", "LEUKOCYTESUR", "RBCUA", "WBCUA", "BACTERIA", "SQUAMEPITHEL", "HYALINECASTS" in the last 48 hours.    Invalid input(s): "WRIGHTSUR"    Significant Imaging: I have reviewed all pertinent imaging results/findings within the past 24 hours.    Echo    Result Date: 11/17/2023    Left Ventricle: The left ventricle is normal in size. Normal wall thickness. Normal wall motion. There is normal " systolic function with a visually estimated ejection fraction of 60 - 65%. There is normal diastolic function.   Right Ventricle: Normal right ventricular cavity size. Wall thickness is normal. Right ventricle wall motion  is normal. Systolic function is normal.   Left Atrium: Left atrium is moderately dilated.   Mitral Valve: There is mild regurgitation with a centrally directed jet.   Tricuspid Valve: There is mild to moderate regurgitation.   Pulmonic Valve: There is mild regurgitation with a centrally directed jet.   Pulmonary Artery: There is moderate pulmonary hypertension. The estimated pulmonary artery systolic pressure is 56 mmHg.   IVC/SVC: Normal venous pressure at 3 mmHg.   Pericardium: Left pleural effusion measuring 4 cm     X-Ray Chest AP Portable    Result Date: 11/17/2023  XR CHEST 1 VIEW CLINICAL HISTORY: 89 years Female Chest Pain COMPARISON: July 17, 2023 FINDINGS: Cardiac silhouette size is mildly enlarged. Atherosclerotic calcification of the aorta. Pleural parenchymal opacity at the left lung base consistent with small left pleural effusion. Blunting of right costophrenic angle consistent with trace right pleural fluid. No chely pulmonary edema. No confluent airspace disease. No pneumothorax. Osteopenia. IMPRESSION: Small volume left and trace right pleural fluid. Mild cardiomegaly. Electronically signed by:  Brannon Hopkins MD  11/17/2023 07:13 AM CST Workstation: [id number]

## 2023-11-21 NOTE — PROGRESS NOTES
[facility]  Department of Cardiology  Progress Note    PATIENT NAME: Jennifer Alarcon  MRN: [id number]  TODAY'S DATE: 11/21/2023  ADMIT DATE: 11/17/2023    SUBJECTIVE     PRINCIPLE PROBLEM: Symptomatic bradycardia  Maggi Rangel MD  Physician [facility] Medicine  89 year old patient getting transferred from Nursing home with HR in the lower 30s   Pt was here in this hospital on July 2023 with bradycardia/SSS  At that point she rejected PPM placement   She was later discharged to [facility]      Past 1 week her HR was in the range of 40s  Yesterday it went down to 30 s and pt was transferred here  When saw in ER pts sister was near bedside  Pt extremely anxious when mentioned about PPM and asking time till Monday before she makes any decision   Denies chest pain /SOB/Dizziness      Overview/Hospital Course:  89F with PMH HTN, hypothyroid, DM2, and pAfib is admitted with symptomatic bradycardia which is not a new thing for her. She has been recommended pacemaker in the past. Her heart rates were 30s-40s. She did not tolerate the dopamine infusion due to nausea. She did not require atropine. Cardiology consulted and is discussion with her for pacemaker again. Palliative consulted and she desires DNR. She has agreed to pacemaker placement scheduled for 11/20/23.     Cardiology evaluation 11/19/20: recommend increasing thyroid medication dose, monitor HR as it seems to be improving. SHe needs to understand importance of compliancy with medication. Treating underlying condition may resolve bradycardia issue with no need for pacemaker. She seems to understand. Ok to step down to [facility]. DC home when cleared by cardiology wit appropriate HR.     INTERVAL HISTORY:    11/21/2023  Patient is resting comfortably without complaints.  She was had no chest pain or shortness of breath.  She did walk around in the room today without any dizziness or lightheadedness.      11/20/23  Patient was resting in bed, alone  at present.  She denies lightheadedness, chest pain, or shortness breath.  Heart rate has been 50-60 beats per minute, atrial fibrillation.  TSH is 9.1  Creatinine 0.6.  Patient states she was sitting on side of bed to eat today.  Has no complaints other than generalized weakness.      11/19/23  At the present time patient's heart rate is about 53 beats per minute.  She is awake alert denies any dizziness.  Discussed with patient at length and in detail along with the family member in regards with permanent pacemaker.  Patient was significantly hypothyroid and replacing thyroxine would be the most appropriate step to get her heart rate up and would also ramp up her thyroxine requirements for the body.  Patient is very anxious about undergoing pacemaker implantation.    Review of patient's allergies indicates:   Allergen Reactions    Nitrofurantoin monohyd/m-cryst      leg cramps       REVIEW OF SYSTEMS  CARDIOVASCULAR:  Generalized weakness.  No recent chest pain, palpitations, arm, neck, or jaw pain  RESPIRATORY: No recent fever, cough chills, SOB or congestion  : No blood in the urine  GI: No Nausea, vomiting, constipation, diarrhea, blood, or reflux.  MUSCULOSKELETAL: No myalgias  NEURO: No lightheadedness or dizziness      OBJECTIVE     VITAL SIGNS (Most Recent)  Temp: 97.8 °F (36.6 °C) (11/21/23 1120)  Pulse: 60 (11/21/23 1120)  Resp: 20 (11/21/23 1120)  BP: 136/61 (11/21/23 1120)  SpO2: 96 % (11/21/23 1120)    VENTILATION STATUS  Resp: 20 (11/21/23 1120)  SpO2: 96 % (11/21/23 1120)           I & O (Last 24H):  Intake/Output Summary (Last 24 hours) at 11/21/2023 1620  Last data filed at 11/21/2023 1348  Gross per 24 hour   Intake 675.55 ml   Output 800 ml   Net -124.45 ml         WEIGHTS  Wt Readings from Last 1 Encounters:   11/17/23 0501 46.6 kg (102 lb 11.8 oz)   11/17/23 0022 46.7 kg (103 lb)       PHYSICAL EXAM  CONSTITUTIONAL:  Elderly female, thin, frail, hard of hearing, poor dentition breathing in no  distress   NECK: no carotid bruit, no JVD  LUNGS:  Breath sounds clear, decreased at the bases.  CHEST WALL: no tenderness  HEART:  Atrial fibrillation, rate is controlled, Systolic murmur audible.   ABDOMEN: soft, flat, nontender, nondistended  EXTREMITIES: Extremities normal, no edema  NEURO: AAO X 3 no gross focal deficits.    SCHEDULED MEDS:   enoxparin  50 mg Subcutaneous Q12H (treatment, non-standard time)    levothyroxine  25 mcg Oral Before breakfast    mupirocin   Nasal BID    polyethylene glycol  17 g Oral Daily       CONTINUOUS INFUSIONS:    PRN MEDS:acetaminophen, atropine, magnesium oxide, magnesium oxide, ondansetron, potassium bicarbonate, potassium bicarbonate, potassium bicarbonate, potassium, sodium phosphates, potassium, sodium phosphates, potassium, sodium phosphates, prochlorperazine    LABS AND DIAGNOSTICS     CBC LAST 3 DAYS  Recent Labs   Lab 11/17/23 0035 11/17/23 0424 11/19/23 0458 11/20/23 0437 11/21/23  0539   WBC 7.02   < > 11.00 5.46 5.50   RBC 4.21   < > 4.26 4.25 3.89*   HGB 12.1   < > 12.2 12.0 11.5*   HCT 37.5   < > 37.8 37.6 34.7*   MCV 89   < > 89 89 89   MCH 28.7   < > 28.6 28.2 29.6   MCHC 32.3   < > 32.3 31.9* 33.1   RDW 14.2   < > 14.2 14.5 14.6*      < > 209 199 210   MPV 10.4   < > 10.7 10.6 10.8   GRAN 56.6  4.0  --   --   --   --    LYMPH 29.6  2.1  --   --   --   --    MONO 9.3  0.7  --   --   --   --    BASO 0.05  --   --   --   --    NRBC 0  --   --   --   --     < > = values in this interval not displayed.         COAGULATION LAST 3 DAYS  Recent Labs   Lab 11/17/23 0424 11/18/23 0317 11/19/23 0458   INR 1.1 1.1 1.1         CHEMISTRY LAST 3 DAYS  Recent Labs   Lab 11/19/23 0458 11/20/23 0437 11/21/23  0539    140 139   K 3.9 4.3 4.3    106 106   CO2 28 31* 29   ANIONGAP 5* 3* 4*   BUN 16 14 15   CREATININE 0.6 0.6 0.5   GLU 88 78 87   CALCIUM 8.9 8.8 8.9   MG 1.8 1.8 1.8   ALBUMIN 3.1* 3.0* 3.0*   BILITOT 1.3* 0.9 1.2*   PROT 6.0 5.8* 5.6*  "  ALKPHOS 38* 40* 34*   ALT 8* 7* 8*   AST 12 11 13         CARDIAC PROFILE LAST 3 DAYS  Recent Labs   Lab 11/17/23  0035 11/17/23  0231   *  --    TROPONINIHS 11.0 9.2         ENDOCRINE LAST 3 DAYS  Recent Labs   Lab 11/17/23  0035   TSH 9.143*         LAST ARTERIAL BLOOD GAS  ABG  No results for input(s): "PH", "PO2", "PCO2", "HCO3", "BE" in the last 168 hours.    LAST 7 DAYS MICROBIOLOGY   Microbiology Results (last 7 days)       ** No results found for the last 168 hours. **            MOST RECENT IMAGING  Echo    Left Ventricle: The left ventricle is normal in size. Normal wall   thickness. Normal wall motion. There is normal systolic function with a   visually estimated ejection fraction of 60 - 65%. There is normal   diastolic function.    Right Ventricle: Normal right ventricular cavity size. Wall thickness   is normal. Right ventricle wall motion  is normal. Systolic function is   normal.    Left Atrium: Left atrium is moderately dilated.    Mitral Valve: There is mild regurgitation with a centrally directed   jet.    Tricuspid Valve: There is mild to moderate regurgitation.    Pulmonic Valve: There is mild regurgitation with a centrally directed   jet.    Pulmonary Artery: There is moderate pulmonary hypertension. The   estimated pulmonary artery systolic pressure is 56 mmHg.    IVC/SVC: Normal venous pressure at 3 mmHg.    Pericardium: Left pleural effusion measuring 4 cm  X-Ray Chest AP Portable  XR CHEST 1 VIEW    CLINICAL HISTORY:  89 years Female Chest Pain    COMPARISON: July 17, 2023    FINDINGS: Cardiac silhouette size is mildly enlarged. Atherosclerotic calcification of the aorta. Pleural parenchymal opacity at the left lung base consistent with small left pleural effusion. Blunting of right costophrenic angle consistent with trace right pleural fluid. No chely pulmonary edema. No confluent airspace disease. No pneumothorax. Osteopenia.    IMPRESSION:    Small volume left and trace right " pleural fluid.    Mild cardiomegaly.    Electronically signed by:  Brannon Hopkins MD  11/17/2023 07:13 AM CST Workstation: [id number]      [facility]  Results for orders placed during the hospital encounter of 11/17/23    Echo    Interpretation Summary    Left Ventricle: The left ventricle is normal in size. Normal wall thickness. Normal wall motion. There is normal systolic function with a visually estimated ejection fraction of 60 - 65%. There is normal diastolic function.    Right Ventricle: Normal right ventricular cavity size. Wall thickness is normal. Right ventricle wall motion  is normal. Systolic function is normal.    Left Atrium: Left atrium is moderately dilated.    Mitral Valve: There is mild regurgitation with a centrally directed jet.    Tricuspid Valve: There is mild to moderate regurgitation.    Pulmonic Valve: There is mild regurgitation with a centrally directed jet.    Pulmonary Artery: There is moderate pulmonary hypertension. The estimated pulmonary artery systolic pressure is 56 mmHg.    IVC/SVC: Normal venous pressure at 3 mmHg.    Pericardium: Left pleural effusion measuring 4 cm      CURRENT/PREVIOUS VISIT EKG  Results for orders placed or performed during the hospital encounter of 11/17/23   EKG 12-lead    Collection Time: 11/17/23 12:24 AM    Narrative    Test Reason : R07.9,    Vent. Rate : 043 BPM     Atrial Rate : 258 BPM     P-R Int : 000 ms          QRS Dur : 112 ms      QT Int : 496 ms       P-R-T Axes : 000 -77 090 degrees     QTc Int : 419 ms    Atrial fibrillation with slow ventricular response  Left axis deviation  Low voltage QRS  Inferior infarct ,age undetermined  Cannot rule out Anteroseptal infarct ,age undetermined  Abnormal ECG  When compared with ECG of 17-JUL-2023 01:54,  Significant changes have occurred  Confirmed by El Thomas MD ([id number]) on 11/20/2023 1:30:26 PM    Referred By: [email or web address]   SELF           Confirmed By:El Thomas MD       ASSESSMENT/PLAN:      Active Hospital Problems    Diagnosis    *Symptomatic bradycardia    Subclinical hypothyroidism    Chronic combined systolic and diastolic heart failure    Goals of care, counseling/discussion    Sick sinus syndrome    Walker as ambulation aid    Anxiety    Atrial fibrillation, controlled       ASSESSMENT & PLAN:   1. Hypothyroidism uncorrected.    2. Atrial Fibrillation with slow ventricular response  3. Possible sick sinus syndrome  4. Bradycardia appears to be multifactorial  5. HF with recovered EF       RECOMMENDATIONS:  1. Continue levothyroxine supplementation  2. Continuous monitoring on telemetry.  Heart rate is averaging in the 50s.  Patient does not meet criteria for permanent pacemaker at this time.    3. If her heart rate stabilizes with supplementation of thyroxine then we can continue medical management.  4. Patient had mild LV dysfunction with grade 3 diastolic dysfunction both of which have improved on the most recent echo.  5. Anticoagulation with Lovenox in presence of atrial fibrillation  6. Continue to hold any AV robson blocker medications             Jadyn Swift NP  [facility]  Department of Cardiology  Date of Service: 11/21/2023  4:05 PM

## 2023-11-22 VITALS
TEMPERATURE: 97 F | BODY MASS INDEX: 17.12 KG/M2 | WEIGHT: 102.75 LBS | OXYGEN SATURATION: 95 % | DIASTOLIC BLOOD PRESSURE: 63 MMHG | RESPIRATION RATE: 17 BRPM | SYSTOLIC BLOOD PRESSURE: 151 MMHG | HEART RATE: 60 BPM | HEIGHT: 65 IN

## 2023-11-22 PROBLEM — Z71.89 GOALS OF CARE, COUNSELING/DISCUSSION: Status: ACTIVE | Noted: 2023-11-22

## 2023-11-22 LAB
ANION GAP SERPL CALC-SCNC: 3 MMOL/L (ref 8–16)
BUN SERPL-MCNC: 16 MG/DL (ref 10–30)
CALCIUM SERPL-MCNC: 8.7 MG/DL (ref 8.7–10.5)
CHLORIDE SERPL-SCNC: 105 MMOL/L (ref 95–110)
CO2 SERPL-SCNC: 30 MMOL/L (ref 23–29)
CREAT SERPL-MCNC: 0.5 MG/DL (ref 0.5–1.4)
EST. GFR  (NO RACE VARIABLE): >60 ML/MIN/1.73 M^2
GLUCOSE SERPL-MCNC: 82 MG/DL (ref 70–110)
MAGNESIUM SERPL-MCNC: 2 MG/DL (ref 1.6–2.6)
POTASSIUM SERPL-SCNC: 4.3 MMOL/L (ref 3.5–5.1)
SODIUM SERPL-SCNC: 138 MMOL/L (ref 136–145)

## 2023-11-22 PROCEDURE — 25000003 PHARM REV CODE 250: Performed by: INTERNAL MEDICINE

## 2023-11-22 PROCEDURE — 36415 COLL VENOUS BLD VENIPUNCTURE: CPT | Performed by: INTERNAL MEDICINE

## 2023-11-22 PROCEDURE — 99232 SBSQ HOSP IP/OBS MODERATE 35: CPT | Mod: ,,, | Performed by: INTERNAL MEDICINE

## 2023-11-22 PROCEDURE — 80048 BASIC METABOLIC PNL TOTAL CA: CPT | Performed by: INTERNAL MEDICINE

## 2023-11-22 PROCEDURE — 25000003 PHARM REV CODE 250: Performed by: HOSPITALIST

## 2023-11-22 PROCEDURE — 99232 PR SUBSEQUENT HOSPITAL CARE,LEVL II: ICD-10-PCS | Mod: ,,, | Performed by: INTERNAL MEDICINE

## 2023-11-22 PROCEDURE — 63600175 PHARM REV CODE 636 W HCPCS: Performed by: HOSPITALIST

## 2023-11-22 PROCEDURE — 99233 PR SUBSEQUENT HOSPITAL CARE,LEVL III: ICD-10-PCS | Mod: ,,, | Performed by: STUDENT IN AN ORGANIZED HEALTH CARE EDUCATION/TRAINING PROGRAM

## 2023-11-22 PROCEDURE — 99233 SBSQ HOSP IP/OBS HIGH 50: CPT | Mod: ,,, | Performed by: STUDENT IN AN ORGANIZED HEALTH CARE EDUCATION/TRAINING PROGRAM

## 2023-11-22 PROCEDURE — 83735 ASSAY OF MAGNESIUM: CPT | Performed by: INTERNAL MEDICINE

## 2023-11-22 RX ORDER — POLYETHYLENE GLYCOL 3350 17 G/17G
17 POWDER, FOR SOLUTION ORAL DAILY
Status: DISCONTINUED | OUTPATIENT
Start: 2023-11-22 | End: 2023-11-22 | Stop reason: HOSPADM

## 2023-11-22 RX ORDER — LEVOTHYROXINE SODIUM 25 UG/1
25 TABLET ORAL
Qty: 30 TABLET | Refills: 0 | Status: SHIPPED | OUTPATIENT
Start: 2023-11-23 | End: 2023-12-23

## 2023-11-22 RX ADMIN — ENOXAPARIN SODIUM 50 MG: 60 INJECTION SUBCUTANEOUS at 05:11

## 2023-11-22 RX ADMIN — ACETAMINOPHEN 650 MG: 325 TABLET ORAL at 09:11

## 2023-11-22 RX ADMIN — POLYETHYLENE GLYCOL 3350 17 G: 17 POWDER, FOR SOLUTION ORAL at 09:11

## 2023-11-22 RX ADMIN — LEVOTHYROXINE SODIUM 25 MCG: 0.03 TABLET ORAL at 05:11

## 2023-11-22 RX ADMIN — APIXABAN 2.5 MG: 2.5 TABLET, FILM COATED ORAL at 08:11

## 2023-11-22 NOTE — PT/OT/SLP PROGRESS
Physical Therapy      Patient Name:  Jennifer Alarcon   MRN:  [id number]    Patient not seen today secondary to Patient unwilling to participate, Other (Comment) (returning to [place] today). Will follow-up 11/24/23.

## 2023-11-22 NOTE — NURSING
Called report to Yuliet calderon w/ [facility] pt returning to her room of D15.    Pt aware and family will be notified of the pt's arrival.     Awaiting the transport van.

## 2023-11-22 NOTE — PLAN OF CARE
AVS sent to Annie at [facility] via [employer].    1415- CM s/w Annie, orders under review. Annie will let CM know when to call report.

## 2023-11-22 NOTE — PROGRESS NOTES
"[facility]  Adult Nutrition   Progress Note (Follow-Up)    SUMMARY     Recommendations  Recommendation/Intervention: 1. RD added ONS, Ensure Plus High Protein (to provide 1050 kcal/day and 60 g/day protein)  2. Continue current diet and encourage adequate PO intake.  Goals: 1. Patient to meet at least 75% of estimated energy and protein needs.  Nutrition Goal Status: new  Communication of RD Recs: reviewed with RN    Dietitian Rounds Brief  Patient said " I think I'm going home today". Ate 25% of lunch, drank all of her ensure.    Diet order:   Current Diet Order: Cardiac           Evaluation of Received Nutrient/Fluid Intake        % Intake of Estimated Energy Needs: 25 - 50 %  % Meal Intake: 25 - 50 %      Intake/Output Summary (Last 24 hours) at 11/22/2023 1453  Last data filed at 11/22/2023 0631  Gross per 24 hour   Intake --   Output 1270 ml   Net -1270 ml        Anthropometrics  Temp: 97.5 °F (36.4 °C)  Height Method: Stated  Height: 5' 5" (165.1 cm)  Height (inches): 65 in  Weight Method: Bed Scale  Weight: 46.6 kg (102 lb 11.8 oz)  Weight (lb): 102.74 lb  Ideal Body Weight (IBW), Female: 125 lb  % Ideal Body Weight, Female (lb): 82.19 %  BMI (Calculated): 17.1  BMI Grade: 17 - 18.4 protein-energy malnutrition grade I  Weight Change Amount:  (recently gained weight. States she went from 93 lbs to 103.)       Estimated/Assessed Needs  Weight Used For Calorie Calculations: 46.6 kg (102 lb 11.8 oz)  Energy Calorie Requirements (kcal): 1398 - 1631 (30 - 35 kcal/kg)  Energy Need Method: Kcal/kg  Protein Requirements: 70 - 93 (1.5 - 2 g/kg)  Weight Used For Protein Calculations: 46.6 kg (102 lb 11.8 oz)  Fluid Requirements (mL): 1165 (25 ml/kg or per MD )  Estimated Fluid Requirement Method: other (see comments)  RDA Method (mL): 1398       Reason for Assessment  Reason For Assessment: identified at risk by screening criteria, low BMI (ICU)  Diagnosis: cardiac disease  Relevant Medical History: " anxiety; a fib; Sick sinus syndrome;  Symptomatic bradycardia;  Chronic combined systolic and diastolic heart failure;  Subclinical hypothyroidism    Nutrition/Diet History  Patient Reported Diet/Restrictions/Preferences: general  Supplemental Drinks or Food Habits: Boost Plus  Food Allergies: NKFA  Factors Affecting Nutritional Intake: other (see comments) (advanced age)    Nutrition Risk Screen  Nutrition Risk Screen: no indicators present       Altered Skin Integrity 11/18/23 1520 Left upper Back Blister(s)-Wound Image: Images linked  MST Score: 2  Have you recently lost weight without trying?: Unsure  Weight loss score: 2  Have you been eating poorly because of a decreased appetite?: No  Appetite score: 0       Weight History:  Wt Readings from Last 5 Encounters:   11/17/23 46.6 kg (102 lb 11.8 oz)   11/17/23 46.6 kg (102 lb 11.8 oz)   07/21/23 48.2 kg (106 lb 4.2 oz)   07/17/23 44 kg (97 lb)   01/28/23 49.9 kg (110 lb)        Lab/Procedures/Meds: Pertinent Labs/Meds Reviewed    Medications:Pertinent Medications Reviewed  Scheduled Meds:   apixaban  2.5 mg Oral BID    levothyroxine  25 mcg Oral Before breakfast    polyethylene glycol  17 g Oral Daily     Continuous Infusions:  PRN Meds:.acetaminophen, atropine, magnesium oxide, magnesium oxide, ondansetron, potassium bicarbonate, potassium bicarbonate, potassium bicarbonate, potassium, sodium phosphates, potassium, sodium phosphates, potassium, sodium phosphates, prochlorperazine    Labs: Pertinent Labs Reviewed  Clinical Chemistry:  Recent Labs   Lab 11/19/23  0458 11/20/23  0437 11/21/23  0539 11/22/23  0433    140 139 138   K 3.9 4.3 4.3 4.3    106 106 105   CO2 28 31* 29 30*   GLU 88 78 87 82   BUN 16 14 15 16   CREATININE 0.6 0.6 0.5 0.5   CALCIUM 8.9 8.8 8.9 8.7   PROT 6.0 5.8* 5.6*  --    ALBUMIN 3.1* 3.0* 3.0*  --    BILITOT 1.3* 0.9 1.2*  --    ALKPHOS 38* 40* 34*  --    AST 12 11 13  --    ALT 8* 7* 8*  --    ANIONGAP 5* 3* 4* 3*   MG 1.8  "1.8 1.8 2.0     CBC:   Recent Labs   Lab 11/21/23  0539   WBC 5.50   RBC 3.89*   HGB 11.5*   HCT 34.7*      MCV 89   MCH 29.6   MCHC 33.1     Lipid Panel:  No results for input(s): "CHOL", "HDL", "LDLCALC", "TRIG", "CHOLHDL" in the last 168 hours.  Cardiac Profile:  Recent Labs   Lab 11/17/23  0035   *     Inflammatory Labs:  No results for input(s): "CRP" in the last 168 hours.  Diabetes:  No results for input(s): "HGBA1C", "POCTGLUCOSE" in the last 168 hours.  Thyroid & Parathyroid:  Recent Labs   Lab 11/17/23  0035   TSH 9.143*   FREET4 1.16       Monitor and Evaluation  Food and Nutrient Intake: food and beverage intake, energy intake  Food and Nutrient Adminstration: diet order  Physical Activity and Function: nutrition-related ADLs and IADLs, factors affecting access to physical activity  Anthropometric Measurements: weight, weight change, body mass index  Biochemical Data, Medical Tests and Procedures: electrolyte and renal panel, inflammatory profile, gastrointestinal profile, lipid profile, glucose/endocrine profile  Nutrition-Focused Physical Findings: overall appearance     Nutrition Risk  Level of Risk/Frequency of Follow-up: moderate - high     Nutrition Follow-Up  RD Follow-up?: Yes      Ira Angel RD, LDN 11/22/2023 2:59 PM        "

## 2023-11-22 NOTE — ASSESSMENT & PLAN NOTE
I reviewed her chart and discussed her case with her team.      I examined Ms. Alarcon bedside.  She was able to accurately recall our previous conversation    She is up-to-date on her overall condition.  She was grateful that she does not have to undergo pacemaker placement.  She was generally in good spirits today.    I took a moment to update her daughter by phone as well.    Her goals are clearly aligned with current plan of care.  I had initially suggested hospice should they not pursue pacemaker placement.  With a new diagnosis of hypothyroidism and treatment her bradycardia has improved.  At this point, I do not believe she has a hospice qualifying diagnosis.    At this point I do much more to add.    I appreciate being involved.  I hope I have been helpful.

## 2023-11-22 NOTE — PLAN OF CARE
Problem: Skin Injury Risk Increased  Goal: Skin Health and Integrity  Intervention: Promote and Optimize Oral Intake  Flowsheets (Taken 11/22/2023 5869)  Oral Nutrition Promotion:   calorie-dense foods provided   calorie-dense liquids provided     Problem: Oral Intake Inadequate  Goal: Improved Oral Intake  Intervention: Promote and Optimize Oral Intake  Flowsheets (Taken 11/22/2023 1457)  Oral Nutrition Promotion:   calorie-dense foods provided   calorie-dense liquids provided    Patient eating 25-50% of of meals and drank all of ensure.  Pt had BM today. Plan is for patient to dc to [facility] today.

## 2023-11-22 NOTE — PHYSICIAN QUERY
"PT Name: Jennifer Alarcon  MR #: [id number]    DOCUMENTATION CLARIFICATION     CDS/: Heather Rain               Contact information: [phone] or Epic messenger    This form is a permanent document in the medical record.     Query Date: November 22, 2023    By submitting this query, we are merely seeking further clarification of documentation.. Please utilize your independent clinical judgment when addressing the question(s) below.    The medical record contains the following:  Clinical Information Location in Medical Record   Clinical Indicators:    BMI on admit: 17.1    Elderly, thin, frail    Low BMI    "Generalized weakness."      RD Note on 11/18 notes:  BMI Grade: 17-18.4, protein-energy malnutrition grade I  Wt change amount: (Recently gained wt. States she went from  lbs)    Wt Hx:  7/21/23 - 106 lbs  11/17/23 - 102 lbs    RD added ONS, Ensure Plus High Protein (to provide 1050 kcal/day and 60 g/day protein)     Continue current diet and encourage adequate PO intake.       No noted body fat loss, muscle mass loss, or reduced  strength.       Flowsheet    Hosp PN  11/20    Hosp PN 11/21    Card PN 11/21                  RD Note 11/18     Academy of Nutrition and Dietetics (Academy) and the American Society for Parenteral and Enteral Nutrition (A.S.P.E.N.) Clinical Characteristics to support Malnutrition      Criteria for mild malnutrition is defined as 1 characteristic outlined above within the established moderate or severe parameters.  A minimum of 2 out of the 6 characteristics noted above are recommended for a diagnosis of moderate or severe malnutrition.  Chronic illness/injury is a disease/condition lasting 3 months or longer.    The noted clinical guidelines are only system guidelines and do not replace the providers clinical judgment.    Provider, please specify diagnosis or diagnoses associated with above clinical findings.    [  ] Mild Malnutrition, POA - 1 characteristic outlined " above within the established moderate or severe parameters     [ X ] Underweight     [  ] Other Nutritional Diagnosis (please specify):        [  ] Malnutrition ruled out       Please document in your progress notes daily for the duration of treatment until resolved and  include in your discharge summary.      References:    CIERRA Michael, & MOSHE Jett (2022, April). Assessment and management of anorexia and cachexia in palliative care. Retrieved May 23, 2022, from https://www.[employer]/contents/assessment-and-management-of-anorexia-and-cachexia-in-palliative-care?[email or web address]&source=see_link     CARLOS Zapata, PhD, RD, JOHANA, VALENTIN Hernandez, PhD, RN, RENEE Buenrostro MD, PhD, Tricia DARLING A., MS, RD, [facility], ALEXANDRIA Martinez, MS, RD, The Academy Malnutrition Work Group, The A.S.P.E.N. Board of Directors. (2012). Consensus Statement: Academy of Nutrition and Dietetics and American Society for Parenteral and Enteral Nutrition: Characteristics Recommended for the Identification and Documentation of Adult Malnutrition (Undernutrition). Journal of Parenteral and Enteral Nutrition, 36(3), 275-283. doi:10.1177/0649619284323831     Form No. [id number]

## 2023-11-22 NOTE — PHYSICIAN QUERY
PT Name: Jennifer Alarcon  MR #: [id number]    DOCUMENTATION CLARIFICATION     CDS/: Heather Rain               Contact information: [phone] or Epic messenger  This form is a permanent document in the medical record.    Query Date: November 22, 2023      By submitting this query, we are merely seeking further clarification of documentation.  Please utilize your independent clinical judgment when addressing the question(s) below.    The Medical Record reflects the following:    Clinical Information Location in Medical Record     Left buttock, stage 2, pressure injury     Applied Triad and a mepilex    RN Note on 11/17 notes altered skin integrity present or discovered w/wound images taken. (Pt admitted on 11/17)     Wound RN 11/20 & noted in Hosp PN 11/21    RN Note 11/17       Please clarify/confirm the Consultants diagnosis of Left Buttock, Stage 2, Pressure Injury:     [ X ] Left Buttock, Stage 2, Pressure Injury, POA, Ruled In     [  ] Left Buttock, Stage 2, Pressure Injury, Not POA, Ruled In     [  ] Other diagnosis (please specify):

## 2023-11-22 NOTE — PLAN OF CARE
Patient cleared for discharge from case management standpoint.    HEIDI sent dc orders to [facility]. Dr Jacobs spoke with Dr Castellanos and cleared pt to return to facility. Nurse Uribe informed to call report to [facility] nurse. [facility] will provide transportation. CM called pt's son Kevin to notify of discharge, left detailed voicemail. CM called Dinh (son) and notified of dc.    Chart and discharge orders reviewed.  Patient discharged to [facility] with no further case management needs.       11/22/23 1520   Final Note   Assessment Type Final Discharge Note   Anticipated Discharge Disposition [facility] Resources/Appts/Education Provided Provided patient/caregiver with written discharge plan information   Post-Acute Status   Post-Acute Authorization Placement   Post-Acute Placement Status Set-up Complete/Auth obtained   Discharge Delays (!) Ambulance Transport/Facility Transport

## 2023-11-22 NOTE — PROGRESS NOTES
"[facility]  Palliative Medicine  Progress Note    Patient Name: Jennifer Alarcon  MRN: [id number]  Admission Date: 11/17/2023  Hospital Length of Stay: 5 days  Code Status: DNR   Attending Provider: Leslie Castellanos MD  Consulting Provider: Young Love MD  Primary Care Physician: Tyrel Gonzales Jr., MD  Principal Problem:Atrial fibrillation    Patient information was obtained from patient, relative(s), past medical records, and primary team.      Assessment/Plan:     Palliative Care  Goals of care, counseling/discussion  I reviewed her chart and discussed her case with her team.      I examined Ms. Alarcon bedside.  She was able to accurately recall our previous conversation    She is up-to-date on her overall condition.  She was grateful that she does not have to undergo pacemaker placement.  She was generally in good spirits today.    I took a moment to update her daughter by phone as well.    Her goals are clearly aligned with current plan of care.  I had initially suggested hospice should they not pursue pacemaker placement.  With a new diagnosis of hypothyroidism and treatment her bradycardia has improved.  At this point, I do not believe she has a hospice qualifying diagnosis.    At this point I do much more to add.    I appreciate being involved.  I hope I have been helpful.          I will sign off. Please contact us if you have any additional questions.    Subjective:     Chief Complaint:   Chief Complaint   Patient presents with    Bradycardia     Bradycardia in 40s x1 week, today heart rate 37 at nursing home    Fatigue       HPI:   Per admit H&P: "89 year old patient getting transferred from Nursing home with HR in the lower 30s   Pt was here in this hospital on July 2023 with bradycardia/SSS  At that point she rejected PPM placement   She was later discharged to [facility]      Past 1 week her HR was in the range of 40s  Yesterday it went down to 30 s and pt was transferred " "here  When saw in ER pts sister was near bedside  Pt extremely anxious when mentioned about PPM and asking time till Monday before she makes any decision   Denies chest pain /SOB/Dizziness "    She has been evaluated by Cardiology whom is recommended pacemaker placement.  She was hesitant to pursue pacemaker placement.  In the absence of pacemaker placement hospice should be considered.  I have been asked to assist with these discussions.    Hospital Course:  No notes on file    Interval History: Evaluated by cardiology; no need for pacemaker. Started treatment for hypothyroidism. No complaints this AM.     Past Medical History:   Diagnosis Date    Atrial fibrillation, controlled     Bullous pemphigoid     CHF (congestive heart failure)     Colon cancer     Diabetes mellitus, type 2     Eczema     Hypertension     Hypothyroidism        Past Surgical History:   Procedure Laterality Date    APPENDECTOMY      BREAST SURGERY      lt breast bx    CHOLECYSTECTOMY      COLON SURGERY      EYE SURGERY         Review of patient's allergies indicates:   Allergen Reactions    Nitrofurantoin monohyd/m-cryst      leg cramps       Medications:  Continuous Infusions:  Scheduled Meds:   enoxparin  50 mg Subcutaneous Q12H (treatment, non-standard time)    levothyroxine  25 mcg Oral Before breakfast    polyethylene glycol  17 g Oral Daily     PRN Meds:acetaminophen, atropine, magnesium oxide, magnesium oxide, ondansetron, potassium bicarbonate, potassium bicarbonate, potassium bicarbonate, potassium, sodium phosphates, potassium, sodium phosphates, potassium, sodium phosphates, prochlorperazine    Family History       Problem Relation (Age of Onset)    Diabetes Mother, Father    Heart disease Mother, Father          Tobacco Use    Smoking status: Never    Smokeless tobacco: Never   Substance and Sexual Activity    Alcohol use: No    Drug use: No    Sexual activity: Never       Review of Systems  Objective:     Vital Signs (Most " Recent):  Temp: 97.5 °F (36.4 °C) (11/22/23 0902)  Pulse: (!) 45 (11/22/23 0727)  Resp: 18 (11/22/23 0727)  BP: (!) 138/53 (11/22/23 0727)  SpO2: 96 % (11/22/23 0727) Vital Signs (24h Range):  Temp:  [97.3 °F (36.3 °C)-98.1 °F (36.7 °C)] 97.5 °F (36.4 °C)  Pulse:  [45-73] 45  Resp:  [17-20] 18  SpO2:  [93 %-97 %] 96 %  BP: (138-150)/(53-73) 138/53     Weight: 46.6 kg (102 lb 11.8 oz)  Body mass index is 17.1 kg/m².       Physical Exam  Vitals reviewed.   Constitutional:       General: She is not in acute distress.     Appearance: She is ill-appearing.   HENT:      Head: Normocephalic and atraumatic.      Right Ear: External ear normal.      Left Ear: External ear normal.      Nose: Nose normal. No congestion.      Mouth/Throat:      Mouth: Mucous membranes are moist.      Pharynx: No oropharyngeal exudate.   Eyes:      General:         Right eye: No discharge.         Left eye: No discharge.      Extraocular Movements: Extraocular movements intact.   Cardiovascular:      Rate and Rhythm: Bradycardia present.      Pulses: Normal pulses.   Pulmonary:      Effort: Pulmonary effort is normal. No respiratory distress.   Abdominal:      General: There is no distension.      Palpations: Abdomen is soft.      Tenderness: There is no abdominal tenderness.   Skin:     General: Skin is warm.   Neurological:      General: No focal deficit present.      Mental Status: She is alert and oriented to person, place, and time.   Psychiatric:         Mood and Affect: Mood normal.         Behavior: Behavior normal.         Thought Content: Thought content normal.         Judgment: Judgment normal.            Review of Symptoms      Symptom Assessment (ESAS 0-10 Scale)  Pain:  0  Dyspnea:  0  Anxiety:  0  Nausea:  0  Depression:  0  Anorexia:  0  Fatigue:  2  Insomnia:  0  Restlessness:  0  Agitation:  0         Performance Status:  50    Living Arrangements:  Lives in nursing home    Psychosocial/Cultural:   See Palliative Psychosocial  "Note: No  **Primary  to Follow**  Palliative Care  Consult: No        Advance Care Planning  Advance Directives:   Do Not Resuscitate Status: Yes      Decision Making:  Patient answered questions and Family answered questions  Goals of Care: What is most important right now is to focus on spending time at home, avoiding the hospital, remaining as independent as possible, symptom/pain control. Accordingly, we have decided that the best plan to meet the patient's goals includes continuing with treatment.         Significant Labs: BMP:   Recent Labs   Lab 11/22/23 0433   GLU 82      K 4.3      CO2 30*   BUN 16   CREATININE 0.5   CALCIUM 8.7   MG 2.0       CBC:   Recent Labs   Lab 11/21/23  0539   WBC 5.50   HGB 11.5*   HCT 34.7*          CBC:   Recent Labs   Lab 11/21/23 0539   WBC 5.50   HGB 11.5*   HCT 34.7*   MCV 89          BMP:  Recent Labs   Lab 11/22/23 0433   GLU 82      K 4.3      CO2 30*   BUN 16   CREATININE 0.5   CALCIUM 8.7   MG 2.0       LFT:  Lab Results   Component Value Date    AST 13 11/21/2023    ALKPHOS 34 (L) 11/21/2023    BILITOT 1.2 (H) 11/21/2023     Albumin:   Albumin   Date Value Ref Range Status   11/21/2023 3.0 (L) 3.5 - 5.2 g/dL Final     Protein:   Total Protein   Date Value Ref Range Status   11/21/2023 5.6 (L) 6.0 - 8.4 g/dL Final     Lactic acid:   No results found for: "LACTATE"    Significant Imaging: I have reviewed all pertinent imaging results/findings within the past 24 hours.    > 45 min visit spent in chart review, face to face discussion of goals of care,  symptom assessment, coordination of care and emotional support.      Young Love MD  Palliative Medicine  [facility]                "

## 2023-11-22 NOTE — SUBJECTIVE & OBJECTIVE
Interval History: Evaluated by cardiology; no need for pacemaker. Started treatment for hypothyroidism. No complaints this AM.     Past Medical History:   Diagnosis Date    Atrial fibrillation, controlled     Bullous pemphigoid     CHF (congestive heart failure)     Colon cancer     Diabetes mellitus, type 2     Eczema     Hypertension     Hypothyroidism        Past Surgical History:   Procedure Laterality Date    APPENDECTOMY      BREAST SURGERY      lt breast bx    CHOLECYSTECTOMY      COLON SURGERY      EYE SURGERY         Review of patient's allergies indicates:   Allergen Reactions    Nitrofurantoin monohyd/m-cryst      leg cramps       Medications:  Continuous Infusions:  Scheduled Meds:   enoxparin  50 mg Subcutaneous Q12H (treatment, non-standard time)    levothyroxine  25 mcg Oral Before breakfast    polyethylene glycol  17 g Oral Daily     PRN Meds:acetaminophen, atropine, magnesium oxide, magnesium oxide, ondansetron, potassium bicarbonate, potassium bicarbonate, potassium bicarbonate, potassium, sodium phosphates, potassium, sodium phosphates, potassium, sodium phosphates, prochlorperazine    Family History       Problem Relation (Age of Onset)    Diabetes Mother, Father    Heart disease Mother, Father          Tobacco Use    Smoking status: Never    Smokeless tobacco: Never   Substance and Sexual Activity    Alcohol use: No    Drug use: No    Sexual activity: Never       Review of Systems  Objective:     Vital Signs (Most Recent):  Temp: 97.5 °F (36.4 °C) (11/22/23 0902)  Pulse: (!) 45 (11/22/23 0727)  Resp: 18 (11/22/23 0727)  BP: (!) 138/53 (11/22/23 0727)  SpO2: 96 % (11/22/23 0727) Vital Signs (24h Range):  Temp:  [97.3 °F (36.3 °C)-98.1 °F (36.7 °C)] 97.5 °F (36.4 °C)  Pulse:  [45-73] 45  Resp:  [17-20] 18  SpO2:  [93 %-97 %] 96 %  BP: (138-150)/(53-73) 138/53     Weight: 46.6 kg (102 lb 11.8 oz)  Body mass index is 17.1 kg/m².       Physical Exam  Vitals reviewed.   Constitutional:       General:  She is not in acute distress.     Appearance: She is ill-appearing.   HENT:      Head: Normocephalic and atraumatic.      Right Ear: External ear normal.      Left Ear: External ear normal.      Nose: Nose normal. No congestion.      Mouth/Throat:      Mouth: Mucous membranes are moist.      Pharynx: No oropharyngeal exudate.   Eyes:      General:         Right eye: No discharge.         Left eye: No discharge.      Extraocular Movements: Extraocular movements intact.   Cardiovascular:      Rate and Rhythm: Bradycardia present.      Pulses: Normal pulses.   Pulmonary:      Effort: Pulmonary effort is normal. No respiratory distress.   Abdominal:      General: There is no distension.      Palpations: Abdomen is soft.      Tenderness: There is no abdominal tenderness.   Skin:     General: Skin is warm.   Neurological:      General: No focal deficit present.      Mental Status: She is alert and oriented to person, place, and time.   Psychiatric:         Mood and Affect: Mood normal.         Behavior: Behavior normal.         Thought Content: Thought content normal.         Judgment: Judgment normal.            Review of Symptoms      Symptom Assessment (ESAS 0-10 Scale)  Pain:  0  Dyspnea:  0  Anxiety:  0  Nausea:  0  Depression:  0  Anorexia:  0  Fatigue:  2  Insomnia:  0  Restlessness:  0  Agitation:  0         Performance Status:  50    Living Arrangements:  Lives in nursing home    Psychosocial/Cultural:   See Palliative Psychosocial Note: No  **Primary  to Follow**  Palliative Care  Consult: No        Advance Care Planning   Advance Directives:   Do Not Resuscitate Status: Yes      Decision Making:  Patient answered questions and Family answered questions  Goals of Care: What is most important right now is to focus on spending time at home, avoiding the hospital, remaining as independent as possible, symptom/pain control. Accordingly, we have decided that the best plan to meet the  "patient's goals includes continuing with treatment.         Significant Labs: BMP:   Recent Labs   Lab 11/22/23 0433   GLU 82      K 4.3      CO2 30*   BUN 16   CREATININE 0.5   CALCIUM 8.7   MG 2.0       CBC:   Recent Labs   Lab 11/21/23  0539   WBC 5.50   HGB 11.5*   HCT 34.7*          CBC:   Recent Labs   Lab 11/21/23  0539   WBC 5.50   HGB 11.5*   HCT 34.7*   MCV 89          BMP:  Recent Labs   Lab 11/22/23 0433   GLU 82      K 4.3      CO2 30*   BUN 16   CREATININE 0.5   CALCIUM 8.7   MG 2.0       LFT:  Lab Results   Component Value Date    AST 13 11/21/2023    ALKPHOS 34 (L) 11/21/2023    BILITOT 1.2 (H) 11/21/2023     Albumin:   Albumin   Date Value Ref Range Status   11/21/2023 3.0 (L) 3.5 - 5.2 g/dL Final     Protein:   Total Protein   Date Value Ref Range Status   11/21/2023 5.6 (L) 6.0 - 8.4 g/dL Final     Lactic acid:   No results found for: "LACTATE"    Significant Imaging: I have reviewed all pertinent imaging results/findings within the past 24 hours.    "

## 2023-11-22 NOTE — PROGRESS NOTES
[facility]  Department of Cardiology  Progress Note    PATIENT NAME: Jennifer Alarcon  MRN: [id number]  TODAY'S DATE: 11/22/2023  ADMIT DATE: 11/17/2023    SUBJECTIVE     PRINCIPLE PROBLEM: Atrial fibrillation  Maggi Rangel MD  Physician [facility]  89 year old patient getting transferred from Nursing home with HR in the lower 30s   Pt was here in this hospital on July 2023 with bradycardia/SSS  At that point she rejected PPM placement   She was later discharged to [facility]      Past 1 week her HR was in the range of 40s  Yesterday it went down to 30 s and pt was transferred here  When saw in ER pts sister was near bedside  Pt extremely anxious when mentioned about PPM and asking time till Monday before she makes any decision   Denies chest pain /SOB/Dizziness      Overview/Hospital Course:  89F with PMH HTN, hypothyroid, DM2, and pAfib is admitted with symptomatic bradycardia which is not a new thing for her. She has been recommended pacemaker in the past. Her heart rates were 30s-40s. She did not tolerate the dopamine infusion due to nausea. She did not require atropine. Cardiology consulted and is discussion with her for pacemaker again. Palliative consulted and she desires DNR. She has agreed to pacemaker placement scheduled for 11/20/23.     Cardiology evaluation 11/19/20: recommend increasing thyroid medication dose, monitor HR as it seems to be improving. SHe needs to understand importance of compliancy with medication. Treating underlying condition may resolve bradycardia issue with no need for pacemaker. She seems to understand. Ok to step down to [employer]. DC home when cleared by cardiology wit appropriate HR.     INTERVAL HISTORY:    11/22/2023  Patient is sleeping, heart rate in normal parameters.  Easily awakens and denies complaints.    11/21/23  Patient is resting comfortably without complaints.  She was had no chest pain or shortness of breath.  She did walk around in the  room today without any dizziness or lightheadedness.      11/20/23  Patient was resting in bed, alone at present.  She denies lightheadedness, chest pain, or shortness breath.  Heart rate has been 50-60 beats per minute, atrial fibrillation.  TSH is 9.1  Creatinine 0.6.  Patient states she was sitting on side of bed to eat today.  Has no complaints other than generalized weakness.      11/19/23  At the present time patient's heart rate is about 53 beats per minute.  She is awake alert denies any dizziness.  Discussed with patient at length and in detail along with the family member in regards with permanent pacemaker.  Patient was significantly hypothyroid and replacing thyroxine would be the most appropriate step to get her heart rate up and would also ramp up her thyroxine requirements for the body.  Patient is very anxious about undergoing pacemaker implantation.    Review of patient's allergies indicates:   Allergen Reactions    Nitrofurantoin monohyd/m-cryst      leg cramps       REVIEW OF SYSTEMS  CARDIOVASCULAR:  Fatigue  No recent chest pain, palpitations, arm, neck, or jaw pain  RESPIRATORY: No recent fever, cough chills, SOB or congestion  : No blood in the urine  GI: No Nausea, vomiting, constipation, diarrhea, blood, or reflux.  MUSCULOSKELETAL: No myalgias  NEURO: No lightheadedness or dizziness      OBJECTIVE     VITAL SIGNS (Most Recent)  Temp: 97.5 °F (36.4 °C) (11/22/23 0902)  Pulse: (!) 45 (11/22/23 0727)  Resp: 18 (11/22/23 0727)  BP: (!) 138/53 (11/22/23 0727)  SpO2: 96 % (11/22/23 0727)    VENTILATION STATUS  Resp: 18 (11/22/23 0727)  SpO2: 96 % (11/22/23 0727)           I & O (Last 24H):  Intake/Output Summary (Last 24 hours) at 11/22/2023 1314  Last data filed at 11/22/2023 0631  Gross per 24 hour   Intake 240 ml   Output 1770 ml   Net -1530 ml       WEIGHTS  Wt Readings from Last 1 Encounters:   11/17/23 0501 46.6 kg (102 lb 11.8 oz)   11/17/23 0022 46.7 kg (103 lb)       PHYSICAL  EXAM  CONSTITUTIONAL:  Elderly female, thin, frail, hard of hearing, poor dentition breathing comfortably in no distress HOB flat position  NECK: no carotid bruit, no JVD  LUNGS:  Breath sounds clear,   CHEST WALL: no tenderness  HEART:  Atrial fibrillation, rate is controlled, Systolic murmur audible.  ABDOMEN: soft, flat, nontender, nondistended  EXTREMITIES: Extremities normal, no edema  NEURO: AAO X 3 no gross focal deficits.    SCHEDULED MEDS:   enoxparin  50 mg Subcutaneous Q12H (treatment, non-standard time)    levothyroxine  25 mcg Oral Before breakfast    polyethylene glycol  17 g Oral Daily       CONTINUOUS INFUSIONS:    PRN MEDS:acetaminophen, atropine, magnesium oxide, magnesium oxide, ondansetron, potassium bicarbonate, potassium bicarbonate, potassium bicarbonate, potassium, sodium phosphates, potassium, sodium phosphates, potassium, sodium phosphates, prochlorperazine    LABS AND DIAGNOSTICS     CBC LAST 3 DAYS  Recent Labs   Lab 11/17/23  0035 11/17/23 0424 11/19/23 0458 11/20/23 0437 11/21/23  0539   WBC 7.02   < > 11.00 5.46 5.50   RBC 4.21   < > 4.26 4.25 3.89*   HGB 12.1   < > 12.2 12.0 11.5*   HCT 37.5   < > 37.8 37.6 34.7*   MCV 89   < > 89 89 89   MCH 28.7   < > 28.6 28.2 29.6   MCHC 32.3   < > 32.3 31.9* 33.1   RDW 14.2   < > 14.2 14.5 14.6*      < > 209 199 210   MPV 10.4   < > 10.7 10.6 10.8   GRAN 56.6  4.0  --   --   --   --    LYMPH 29.6  2.1  --   --   --   --    MONO 9.3  0.7  --   --   --   --    BASO 0.05  --   --   --   --    NRBC 0  --   --   --   --     < > = values in this interval not displayed.       COAGULATION LAST 3 DAYS  Recent Labs   Lab 11/17/23 0424 11/18/23 0317 11/19/23 0458   INR 1.1 1.1 1.1       CHEMISTRY LAST 3 DAYS  Recent Labs   Lab 11/19/23 0458 11/20/23 0437 11/21/23  0539 11/22/23  0433    140 139 138   K 3.9 4.3 4.3 4.3    106 106 105   CO2 28 31* 29 30*   ANIONGAP 5* 3* 4* 3*   BUN 16 14 15 16   CREATININE 0.6 0.6 0.5 0.5   GLU  "88 78 87 82   CALCIUM 8.9 8.8 8.9 8.7   MG 1.8 1.8 1.8 2.0   ALBUMIN 3.1* 3.0* 3.0*  --    BILITOT 1.3* 0.9 1.2*  --    PROT 6.0 5.8* 5.6*  --    ALKPHOS 38* 40* 34*  --    ALT 8* 7* 8*  --    AST 12 11 13  --        CARDIAC PROFILE LAST 3 DAYS  Recent Labs   Lab 11/17/23  0035 11/17/23  0231   *  --    TROPONINIHS 11.0 9.2       ENDOCRINE LAST 3 DAYS  Recent Labs   Lab 11/17/23  0035   TSH 9.143*       LAST ARTERIAL BLOOD GAS  ABG  No results for input(s): "PH", "PO2", "PCO2", "HCO3", "BE" in the last 168 hours.    LAST 7 DAYS MICROBIOLOGY   Microbiology Results (last 7 days)       ** No results found for the last 168 hours. **            MOST RECENT IMAGING  Echo    Left Ventricle: The left ventricle is normal in size. Normal wall   thickness. Normal wall motion. There is normal systolic function with a   visually estimated ejection fraction of 60 - 65%. There is normal   diastolic function.    Right Ventricle: Normal right ventricular cavity size. Wall thickness   is normal. Right ventricle wall motion  is normal. Systolic function is   normal.    Left Atrium: Left atrium is moderately dilated.    Mitral Valve: There is mild regurgitation with a centrally directed   jet.    Tricuspid Valve: There is mild to moderate regurgitation.    Pulmonic Valve: There is mild regurgitation with a centrally directed   jet.    Pulmonary Artery: There is moderate pulmonary hypertension. The   estimated pulmonary artery systolic pressure is 56 mmHg.    IVC/SVC: Normal venous pressure at 3 mmHg.    Pericardium: Left pleural effusion measuring 4 cm  X-Ray Chest AP Portable  XR CHEST 1 VIEW    CLINICAL HISTORY:  89 years Female Chest Pain    COMPARISON: July 17, 2023    FINDINGS: Cardiac silhouette size is mildly enlarged. Atherosclerotic calcification of the aorta. Pleural parenchymal opacity at the left lung base consistent with small left pleural effusion. Blunting of right costophrenic angle consistent with trace right " pleural fluid. No chely pulmonary edema. No confluent airspace disease. No pneumothorax. Osteopenia.    IMPRESSION:    Small volume left and trace right pleural fluid.    Mild cardiomegaly.    Electronically signed by:  Brannon Hopkins MD  11/17/2023 07:13 AM [facility] Workstation: [id number]      [employer]  Results for orders placed during the hospital encounter of 11/17/23    Echo    Interpretation Summary    Left Ventricle: The left ventricle is normal in size. Normal wall thickness. Normal wall motion. There is normal systolic function with a visually estimated ejection fraction of 60 - 65%. There is normal diastolic function.    Right Ventricle: Normal right ventricular cavity size. Wall thickness is normal. Right ventricle wall motion  is normal. Systolic function is normal.    Left Atrium: Left atrium is moderately dilated.    Mitral Valve: There is mild regurgitation with a centrally directed jet.    Tricuspid Valve: There is mild to moderate regurgitation.    Pulmonic Valve: There is mild regurgitation with a centrally directed jet.    Pulmonary Artery: There is moderate pulmonary hypertension. The estimated pulmonary artery systolic pressure is 56 mmHg.    IVC/SVC: Normal venous pressure at 3 mmHg.    Pericardium: Left pleural effusion measuring 4 cm      CURRENT/PREVIOUS VISIT EKG  Results for orders placed or performed during the hospital encounter of 11/17/23   EKG 12-lead    Collection Time: 11/17/23 12:24 AM    Narrative    Test Reason : R07.9,    Vent. Rate : 043 BPM     Atrial Rate : 258 BPM     P-R Int : 000 ms          QRS Dur : 112 ms      QT Int : 496 ms       P-R-T Axes : 000 -77 090 degrees     QTc Int : 419 ms    Atrial fibrillation with slow ventricular response  Left axis deviation  Low voltage QRS  Inferior infarct ,age undetermined  Cannot rule out Anteroseptal infarct ,age undetermined  Abnormal ECG  When compared with ECG of 17-JUL-2023 01:54,  Significant changes have occurred  Confirmed by  El Thomas MD ([id number]) on 11/20/2023 1:30:26 PM    Referred By: AAAREFLEIGH   SELF           Confirmed By:El Thomas MD       ASSESSMENT/PLAN:     Active Hospital Problems    Diagnosis    *Atrial fibrillation/flutter with slow ventricular response    Pulmonary hypertension    Normocytic anemia    Subclinical hypothyroidism    Sick sinus syndrome    Walker as ambulation aid    Anxiety    Advanced age    DNR (do not resuscitate)    Current use of long term anticoagulation       ASSESSMENT & PLAN:   1. Hypothyroidism uncorrected.    2. Atrial Fibrillation with slow ventricular response  3. Possible sick sinus syndrome  4. Bradycardia appears to be multifactorial  5. HF with recovered EF       RECOMMENDATIONS:  1. Continue levothyroxine supplementation  2. Heart rate has remained stable on continuous telemetry.  Patient does not meet criteria for permanent pacemaker at this time.    3. Patient had mild LV dysfunction with grade 3 diastolic dysfunction both of which have improved on the most recent echo.  4. Transition anticoagulation to Eliquis 2.5 mg b.i.d. for PAF  5. Do not resume home digoxin.  Hold all AV robson blockers forward.    OK to discharge from cardiology standpoint. Have patient follow up outpatient with her established cardiologist Dr. Horan.    Jadyn Swift NP  [facility]  Department of Cardiology  Date of Service: 11/22/2023  4:05 PM    I have personally interviewed and examined the patient, I have reviewed the Nurse Practitioner's history and physical, assessment, and plan. I have personally evaluated the patient at bedside and agree with the findings and made appropriate changes as necessary in recommendations.    Dr. Christian MD  Department of Cardiology  [facility]  Date of Service: 11/22/23

## 2023-11-23 PROBLEM — R00.1 SYMPTOMATIC BRADYCARDIA: Status: ACTIVE | Noted: 2023-11-23

## 2023-11-23 NOTE — DISCHARGE SUMMARY
[facility] Hospital Medicine  Discharge Summary      Patient Name: Jennifer Alarcon  MRN: [id number]  DRE: [id number]  Patient Class: IP- Inpatient  Admission Date: 11/17/2023  Hospital Length of Stay: 5 days  Discharge Date and Time: 11/22/2023  8:48 PM  Attending Physician: No att. providers found   Discharging Provider: Leslie Castellanos MD  Primary Care Provider: Tyrel Gonzales Jr., MD    Primary Care Team: Networked reference to record PCT     HPI:   89 year old patient getting transferred from Nursing home with HR in the lower 30s   Pt was here in this hospital on July 2023 with bradycardia/SSS  At that point she rejected PPM placement   She was later discharged to [facility]     Past 1 week her HR was in the range of 40s  Yesterday it went down to 30 s and pt was transferred here  When saw in ER pts sister was near bedside  Pt extremely anxious when mentioned about PPM and asking time till Monday before she makes any decision   Denies chest pain /SOB/Dizziness     * No surgery found *      Hospital Course:   89F with PMH HTN, hypothyroid, DM2, and pAfib is admitted with symptomatic bradycardia which is not a new thing for her. She has been recommended pacemaker in the past. Her heart rates were 30s-40s. She did not tolerate the dopamine infusion due to nausea. She did not require atropine. Cardiology consulted and is discussion with her for pacemaker again. Palliative consulted and she desires DNR. She has agreed to pacemaker placement scheduled for 11/20/23.    Cardiology evaluation 11/19/20: recommend increasing thyroid medication dose, monitor HR as it seems to be improving. SHe needs to understand importance of compliancy with medication. Treating underlying condition may resolve bradycardia issue with no need for pacemaker. She seems to understand. Ok to step down to med -tele. DC home when cleared by cardiology wit appropriate HR. HR in 50s.     Assumed care of this patient on  11/21/2023.  Patient current nursing home resident ([place]ul[place]), history of atrial fibrillation/flutter (on digoxin 125 mcg and Eliquis POA) with prior admission for bradycardia at which time declined PPM who was transferred to the ED due to bradycardia with heart rates in the 30s.  EKG with atrial fibrillation with slow ventricular response, serial high sensitivity troponin negative, , TSH elevated at 9.14 with normal free T4, subclinical hypothyroidism, transthoracic echocardiogram with EF of 60-65% with normal diastolic dysfunction with pulmonary hypertension with PASP 56.  Digoxin level on presentation < 0.3 and held given bradycardia/slow ventricular response.  Home Eliquis held in anticipation potential need for permanent pacemaker and switched to therapeutic dose Lovenox, seen by Cardiology in consultation and initially was planned for pacemaker but on review current recommendations no indication for pacemaker.  Seen by palliative care, DNR code status.  She was monitored closely during her inpatient hospital stay with cardiology following.  Started on low-dose levothyroxine with recommendations for repeating TFT in few weeks.  Cardiology cleared patient for discharge, digoxin discontinued, resumed home Eliquis, no current indication for pacemaker, follow-up regular outpatient cardiologist.  Discharge plan including medication, follow-up as well as return precautions were reviewed, no objection to discharge.  Discussed with nursing and case management on day discharge.  Communicated with Cardiology on day of discharge.    Discharge examination   Elderly female, lying in bed, on room air       Goals of Care Treatment Preferences:  Code Status: DNR          What is most important right now is to focus on spending time at home, avoiding the hospital, remaining as independent as possible, symptom/pain control.  Accordingly, we have decided that the best plan to meet the patient's goals includes continuing  with treatment.      Consults:   Consults (From admission, onward)          Status Ordering Provider     Inpatient consult to Palliative Care  Once        Provider:  (Not yet assigned)    Completed SYL TIDWELL     Inpatient consult to Cardiology  Once        Provider:  Matt Thomas MD    Completed HERIBERTO ROCKWELL            No new Assessment & Plan notes have been filed under this hospital service since the last note was generated.  Service: Hospital Medicine    Final Active Diagnoses:    Diagnosis Date Noted POA    PRINCIPAL PROBLEM:  Atrial fibrillation/flutter with slow ventricular response [I48.91] 11/21/2023 Yes    Sick sinus syndrome [I49.5] 07/17/2023 Yes    Subclinical hypothyroidism [E03.8] 11/18/2023 Yes    Bradycardia [R00.1] 11/23/2023 Yes    Goals of care, counseling/discussion [Z71.89] 11/22/2023 Not Applicable    Pulmonary hypertension [I27.20] 11/21/2023 Yes    Normocytic anemia [D64.9] 11/21/2023 Yes    Walker as ambulation aid [Z99.89] 07/17/2023 Not Applicable     Chronic    Anxiety [F41.9] 07/17/2023 Yes     Chronic    Advanced age [R54] 07/17/2023 Yes     Chronic    DNR (do not resuscitate) [Z66] 07/17/2023 Yes     Chronic    Current use of long term anticoagulation [Z79.01] 07/17/2023 Not Applicable     Chronic      Problems Resolved During this Admission:       Discharged Condition: good    Disposition: Discharged to Nursing Fa*    Follow Up:   Follow-up Information       Darius Horan MD. Schedule an appointment as soon as possible for a visit in 2 week(s).    Specialties: Cardiology, Interventional Cardiology  Why: follow up  Contact information:  [address] [facility] [address]  [phone]               Tyrel Gonzales Jr., MD. Schedule an appointment as soon as possible for a visit in 1 week(s).    Specialty: Internal Medicine  Contact information:  [address]   [place] [address]  [phone]                           Patient Instructions:      Diet Adult  "Regular     Wound care routine (specify)   Order Comments: Wound care recommendations  Posterior mid and left side of back, blister from a pacer pad, 6x20.1cm erupted healing blister pink wound bed, able to see outline of pad.  Applied thin layer of Triad and covered with mepilex.  Left buttock stage 2 pressure injury. 1.1x1.2x0.1cm open area, 7x5cm red non blanching periwound.  Patient states she has had for over a year.  Applied Triad and a mepilex     Notify your health care provider if you experience any of the following:  difficulty breathing or increased cough     Notify your health care provider if you experience any of the following:  persistent dizziness, light-headedness, or visual disturbances     Activity as tolerated       Significant Diagnostic Studies: Labs: BMP:   Recent Labs   Lab 11/22/23 0433   GLU 82      K 4.3      CO2 30*   BUN 16   CREATININE 0.5   CALCIUM 8.7   MG 2.0   , CMP   Recent Labs   Lab 11/22/23 0433      K 4.3      CO2 30*   GLU 82   BUN 16   CREATININE 0.5   CALCIUM 8.7   ANIONGAP 3*   , CBC No results for input(s): "WBC", "HGB", "HCT", "PLT" in the last 48 hours., INR   Lab Results   Component Value Date    INR 1.1 11/19/2023    INR 1.1 11/18/2023    INR 1.1 11/17/2023   , Lipid Panel   Lab Results   Component Value Date    CHOL 163 06/24/2022    HDL 53 06/24/2022    LDLCALC 96 06/24/2022    TRIG 74 06/24/2022    CHOLHDL 24.5 01/10/2020   , Troponin No results for input(s): "TROPONINI" in the last 168 hours., and A1C: No results for input(s): "HGBA1C" in the last 4320 hours.    Pending Diagnostic Studies:       Procedure Component Value Units Date/Time    EKG 12-lead [[id number]]     Order Status: Sent Lab Status: No result          Echo    Result Date: 11/17/2023    Left Ventricle: The left ventricle is normal in size. Normal wall thickness. Normal wall motion. There is normal systolic function with a visually estimated ejection fraction of 60 - 65%. " There is normal diastolic function.   Right Ventricle: Normal right ventricular cavity size. Wall thickness is normal. Right ventricle wall motion  is normal. Systolic function is normal.   Left Atrium: Left atrium is moderately dilated.   Mitral Valve: There is mild regurgitation with a centrally directed jet.   Tricuspid Valve: There is mild to moderate regurgitation.   Pulmonic Valve: There is mild regurgitation with a centrally directed jet.   Pulmonary Artery: There is moderate pulmonary hypertension. The estimated pulmonary artery systolic pressure is 56 mmHg.   IVC/SVC: Normal venous pressure at 3 mmHg.   Pericardium: Left pleural effusion measuring 4 cm     X-Ray Chest AP Portable    Result Date: 11/17/2023  XR CHEST 1 VIEW CLINICAL HISTORY: 89 years Female Chest Pain COMPARISON: July 17, 2023 FINDINGS: Cardiac silhouette size is mildly enlarged. Atherosclerotic calcification of the aorta. Pleural parenchymal opacity at the left lung base consistent with small left pleural effusion. Blunting of right costophrenic angle consistent with trace right pleural fluid. No chely pulmonary edema. No confluent airspace disease. No pneumothorax. Osteopenia. IMPRESSION: Small volume left and trace right pleural fluid. Mild cardiomegaly. Electronically signed by:  Brannon Hopkins MD  11/17/2023 07:13 AM CST Workstation: [id number]     Medications:  Reconciled Home Medications:      Medication List        START taking these medications      levothyroxine 25 MCG tablet  Commonly known as: SYNTHROID  Take 1 tablet (25 mcg total) by mouth before breakfast.            CONTINUE taking these medications      bisacodyL 5 mg EC tablet  Commonly known as: DULCOLAX  Take 5 mg by mouth daily as needed for Constipation.     busPIRone 5 MG Tab  Commonly known as: BUSPAR  Take 5 mg by mouth 3 (three) times daily.     ELIQUIS 2.5 mg Tab  Generic drug: apixaban  Take 2.5 mg by mouth 2 (two) times daily.     fluocinolone acetonide oiL 0.01  % Drop  Place 5 drops in ear(s) 2 (two) times daily as needed.     furosemide 20 MG tablet  Commonly known as: LASIX  Take 1 tablet (20 mg total) by mouth daily as needed (swelling/edema).     latanoprost 0.005 % ophthalmic solution  Place 1 drop into both eyes every evening.     polyethylene glycol 17 gram/dose powder  Commonly known as: GLYCOLAX  Take 17 g by mouth once daily.            STOP taking these medications      digoxin 125 mcg tablet  Commonly known as: LANOXIN              Indwelling Lines/Drains at time of discharge:   Lines/Drains/Airways       None                   Time spent on the discharge of patient: 36 minutes         Leslie Castellanos MD  Department of Hospital Medicine  [facility]

## 2024-05-28 ENCOUNTER — LAB VISIT (OUTPATIENT)
Dept: LAB | Facility: HOSPITAL | Age: 89
End: 2024-05-28
Attending: FAMILY MEDICINE
Payer: MEDICARE

## 2024-05-28 DIAGNOSIS — E44.1 MALNUTRITION OF MILD DEGREE: Primary | ICD-10-CM

## 2024-05-28 LAB
ALBUMIN SERPL BCP-MCNC: 3.7 G/DL (ref 3.5–5.2)
ALP SERPL-CCNC: 55 U/L (ref 55–135)
ALT SERPL W/O P-5'-P-CCNC: 14 U/L (ref 10–44)
ANION GAP SERPL CALC-SCNC: 11 MMOL/L (ref 8–16)
AST SERPL-CCNC: 19 U/L (ref 10–40)
BASOPHILS # BLD AUTO: 0.01 K/UL (ref 0–0.2)
BASOPHILS NFR BLD: 0.2 % (ref 0–1.9)
BILIRUB SERPL-MCNC: 2.7 MG/DL (ref 0.1–1)
BUN SERPL-MCNC: 19 MG/DL (ref 10–30)
CALCIUM SERPL-MCNC: 9.3 MG/DL (ref 8.7–10.5)
CHLORIDE SERPL-SCNC: 100 MMOL/L (ref 95–110)
CO2 SERPL-SCNC: 28 MMOL/L (ref 23–29)
CREAT SERPL-MCNC: 0.9 MG/DL (ref 0.5–1.4)
DIFFERENTIAL METHOD BLD: ABNORMAL
EOSINOPHIL # BLD AUTO: 0.1 K/UL (ref 0–0.5)
EOSINOPHIL NFR BLD: 1 % (ref 0–8)
ERYTHROCYTE [DISTWIDTH] IN BLOOD BY AUTOMATED COUNT: 14.4 % (ref 11.5–14.5)
EST. GFR  (NO RACE VARIABLE): >60 ML/MIN/1.73 M^2
GLUCOSE SERPL-MCNC: 163 MG/DL (ref 70–110)
HCT VFR BLD AUTO: 31.3 % (ref 37–48.5)
HGB BLD-MCNC: 10.1 G/DL (ref 12–16)
IMM GRANULOCYTES # BLD AUTO: 0.01 K/UL (ref 0–0.04)
IMM GRANULOCYTES NFR BLD AUTO: 0.2 % (ref 0–0.5)
LYMPHOCYTES # BLD AUTO: 1 K/UL (ref 1–4.8)
LYMPHOCYTES NFR BLD: 18.7 % (ref 18–48)
MCH RBC QN AUTO: 30.2 PG (ref 27–31)
MCHC RBC AUTO-ENTMCNC: 32.3 G/DL (ref 32–36)
MCV RBC AUTO: 94 FL (ref 82–98)
MONOCYTES # BLD AUTO: 0.5 K/UL (ref 0.3–1)
MONOCYTES NFR BLD: 9.4 % (ref 4–15)
NEUTROPHILS # BLD AUTO: 3.7 K/UL (ref 1.8–7.7)
NEUTROPHILS NFR BLD: 70.5 % (ref 38–73)
NRBC BLD-RTO: 0 /100 WBC
PLATELET # BLD AUTO: 171 K/UL (ref 150–450)
PMV BLD AUTO: 10.7 FL (ref 9.2–12.9)
POTASSIUM SERPL-SCNC: 4.4 MMOL/L (ref 3.5–5.1)
PROT SERPL-MCNC: 6.5 G/DL (ref 6–8.4)
RBC # BLD AUTO: 3.34 M/UL (ref 4–5.4)
SODIUM SERPL-SCNC: 139 MMOL/L (ref 136–145)
WBC # BLD AUTO: 5.23 K/UL (ref 3.9–12.7)

## 2024-05-28 PROCEDURE — 80053 COMPREHEN METABOLIC PANEL: CPT | Performed by: FAMILY MEDICINE

## 2024-05-28 PROCEDURE — 36415 COLL VENOUS BLD VENIPUNCTURE: CPT | Performed by: FAMILY MEDICINE

## 2024-05-28 PROCEDURE — 85025 COMPLETE CBC W/AUTO DIFF WBC: CPT | Performed by: FAMILY MEDICINE
